# Patient Record
Sex: MALE | Race: WHITE | NOT HISPANIC OR LATINO | Employment: OTHER | URBAN - METROPOLITAN AREA
[De-identification: names, ages, dates, MRNs, and addresses within clinical notes are randomized per-mention and may not be internally consistent; named-entity substitution may affect disease eponyms.]

---

## 2021-01-01 ENCOUNTER — APPOINTMENT (OUTPATIENT)
Dept: NEUROLOGY | Facility: CLINIC | Age: 66
End: 2021-01-01
Attending: PHYSICIAN ASSISTANT
Payer: COMMERCIAL

## 2021-01-01 ENCOUNTER — APPOINTMENT (OUTPATIENT)
Dept: CT IMAGING | Facility: CLINIC | Age: 66
End: 2021-01-01
Attending: PHYSICIAN ASSISTANT
Payer: COMMERCIAL

## 2021-01-01 ENCOUNTER — APPOINTMENT (OUTPATIENT)
Dept: GENERAL RADIOLOGY | Facility: CLINIC | Age: 66
End: 2021-01-01
Attending: PHYSICIAN ASSISTANT
Payer: COMMERCIAL

## 2021-01-01 ENCOUNTER — APPOINTMENT (OUTPATIENT)
Dept: GENERAL RADIOLOGY | Facility: CLINIC | Age: 66
End: 2021-01-01
Attending: EMERGENCY MEDICINE
Payer: COMMERCIAL

## 2021-01-01 ENCOUNTER — APPOINTMENT (OUTPATIENT)
Dept: CARDIOLOGY | Facility: CLINIC | Age: 66
End: 2021-01-01
Attending: NURSE PRACTITIONER
Payer: COMMERCIAL

## 2021-01-01 ENCOUNTER — ANESTHESIA EVENT (OUTPATIENT)
Dept: SURGERY | Facility: CLINIC | Age: 66
End: 2021-01-01
Payer: COMMERCIAL

## 2021-01-01 ENCOUNTER — PREP FOR PROCEDURE (OUTPATIENT)
Dept: CARDIOLOGY | Facility: CLINIC | Age: 66
End: 2021-01-01

## 2021-01-01 ENCOUNTER — APPOINTMENT (OUTPATIENT)
Dept: ULTRASOUND IMAGING | Facility: CLINIC | Age: 66
End: 2021-01-01
Attending: PHYSICIAN ASSISTANT
Payer: COMMERCIAL

## 2021-01-01 ENCOUNTER — APPOINTMENT (OUTPATIENT)
Dept: GENERAL RADIOLOGY | Facility: CLINIC | Age: 66
End: 2021-01-01
Attending: INTERNAL MEDICINE
Payer: COMMERCIAL

## 2021-01-01 ENCOUNTER — SURGERY (OUTPATIENT)
Age: 66
End: 2021-01-01
Payer: COMMERCIAL

## 2021-01-01 ENCOUNTER — APPOINTMENT (OUTPATIENT)
Dept: MRI IMAGING | Facility: CLINIC | Age: 66
End: 2021-01-01
Attending: INTERNAL MEDICINE
Payer: COMMERCIAL

## 2021-01-01 ENCOUNTER — APPOINTMENT (OUTPATIENT)
Dept: GENERAL RADIOLOGY | Facility: CLINIC | Age: 66
End: 2021-01-01
Attending: STUDENT IN AN ORGANIZED HEALTH CARE EDUCATION/TRAINING PROGRAM
Payer: COMMERCIAL

## 2021-01-01 ENCOUNTER — ANESTHESIA (OUTPATIENT)
Dept: SURGERY | Facility: CLINIC | Age: 66
End: 2021-01-01
Payer: COMMERCIAL

## 2021-01-01 ENCOUNTER — HOSPITAL ENCOUNTER (INPATIENT)
Facility: CLINIC | Age: 66
LOS: 9 days | End: 2021-10-26
Attending: INTERNAL MEDICINE | Admitting: STUDENT IN AN ORGANIZED HEALTH CARE EDUCATION/TRAINING PROGRAM
Payer: COMMERCIAL

## 2021-01-01 ENCOUNTER — APPOINTMENT (OUTPATIENT)
Dept: CARDIOLOGY | Facility: CLINIC | Age: 66
End: 2021-01-01
Attending: PHYSICIAN ASSISTANT
Payer: COMMERCIAL

## 2021-01-01 ENCOUNTER — HOSPITAL ENCOUNTER (EMERGENCY)
Facility: CLINIC | Age: 66
Discharge: STILL ADMITTED OR ADMINISTRATIVE | End: 2021-10-17
Attending: EMERGENCY MEDICINE
Payer: COMMERCIAL

## 2021-01-01 ENCOUNTER — HOSPITAL ENCOUNTER (EMERGENCY)
Facility: CLINIC | Age: 66
Discharge: ANOTHER HOSPITAL | End: 2021-10-17
Attending: EMERGENCY MEDICINE | Admitting: EMERGENCY MEDICINE
Payer: COMMERCIAL

## 2021-01-01 ENCOUNTER — APPOINTMENT (OUTPATIENT)
Dept: ULTRASOUND IMAGING | Facility: CLINIC | Age: 66
End: 2021-01-01
Attending: STUDENT IN AN ORGANIZED HEALTH CARE EDUCATION/TRAINING PROGRAM
Payer: COMMERCIAL

## 2021-01-01 VITALS
BODY MASS INDEX: 30.46 KG/M2 | DIASTOLIC BLOOD PRESSURE: 61 MMHG | OXYGEN SATURATION: 98 % | HEART RATE: 85 BPM | WEIGHT: 224.87 LBS | RESPIRATION RATE: 12 BRPM | HEIGHT: 72 IN | SYSTOLIC BLOOD PRESSURE: 114 MMHG | TEMPERATURE: 103 F

## 2021-01-01 VITALS
HEART RATE: 69 BPM | WEIGHT: 248.02 LBS | OXYGEN SATURATION: 95 % | BODY MASS INDEX: 33.64 KG/M2 | DIASTOLIC BLOOD PRESSURE: 84 MMHG | SYSTOLIC BLOOD PRESSURE: 113 MMHG | RESPIRATION RATE: 23 BRPM

## 2021-01-01 DIAGNOSIS — I46.9 CARDIAC ARREST (H): Primary | ICD-10-CM

## 2021-01-01 DIAGNOSIS — I49.01 VENTRICULAR FIBRILLATION (H): ICD-10-CM

## 2021-01-01 DIAGNOSIS — I21.3 ST ELEVATION MI (STEMI) (H): ICD-10-CM

## 2021-01-01 DIAGNOSIS — I46.9 CARDIAC ARREST (H): ICD-10-CM

## 2021-01-01 LAB
7AMINOCLONAZEPAM SERPL-MCNC: NEGATIVE NG/ML
ABO/RH(D): NORMAL
ACT BLD: 126 SECONDS (ref 74–150)
ACT BLD: 126 SECONDS (ref 74–150)
ACT BLD: 142 SECONDS (ref 74–150)
ACT BLD: 142 SECONDS (ref 74–150)
ACT BLD: 143 SECONDS (ref 74–150)
ACT BLD: 155 SECONDS (ref 74–150)
ACT BLD: 163 SECONDS (ref 74–150)
ACT BLD: 167 SECONDS (ref 74–150)
ACT BLD: 171 SECONDS (ref 74–150)
ACT BLD: 175 SECONDS (ref 74–150)
ACT BLD: 179 SECONDS (ref 74–150)
ACT BLD: 183 SECONDS (ref 74–150)
ACT BLD: 183 SECONDS (ref 74–150)
ACT BLD: 187 SECONDS (ref 74–150)
ACT BLD: 187 SECONDS (ref 74–150)
ACT BLD: 191 SECONDS (ref 74–150)
ACT BLD: 195 SECONDS (ref 74–150)
ACT BLD: 199 SECONDS (ref 74–150)
ACT BLD: 203 SECONDS (ref 74–150)
ACT BLD: 207 SECONDS (ref 74–150)
ACT BLD: 211 SECONDS (ref 74–150)
ACT BLD: 215 SECONDS (ref 74–150)
ACT BLD: 219 SECONDS (ref 74–150)
ACT BLD: 231 SECONDS (ref 74–150)
ACT BLD: 86 SECONDS (ref 74–150)
ALBUMIN SERPL-MCNC: 2 G/DL (ref 3.4–5)
ALBUMIN SERPL-MCNC: 2 G/DL (ref 3.4–5)
ALBUMIN SERPL-MCNC: 2.1 G/DL (ref 3.4–5)
ALBUMIN SERPL-MCNC: 2.2 G/DL (ref 3.4–5)
ALBUMIN SERPL-MCNC: 2.3 G/DL (ref 3.4–5)
ALBUMIN SERPL-MCNC: 2.4 G/DL (ref 3.4–5)
ALBUMIN SERPL-MCNC: 2.5 G/DL (ref 3.4–5)
ALBUMIN SERPL-MCNC: 2.5 G/DL (ref 3.4–5)
ALBUMIN SERPL-MCNC: 2.6 G/DL (ref 3.4–5)
ALBUMIN SERPL-MCNC: 2.8 G/DL (ref 3.4–5)
ALBUMIN SERPL-MCNC: 3.1 G/DL (ref 3.4–5)
ALBUMIN UR-MCNC: 50 MG/DL
ALP SERPL-CCNC: 50 U/L (ref 40–150)
ALP SERPL-CCNC: 51 U/L (ref 40–150)
ALP SERPL-CCNC: 54 U/L (ref 40–150)
ALP SERPL-CCNC: 55 U/L (ref 40–150)
ALP SERPL-CCNC: 56 U/L (ref 40–150)
ALP SERPL-CCNC: 56 U/L (ref 40–150)
ALP SERPL-CCNC: 59 U/L (ref 40–150)
ALP SERPL-CCNC: 59 U/L (ref 40–150)
ALP SERPL-CCNC: 60 U/L (ref 40–150)
ALP SERPL-CCNC: 61 U/L (ref 40–150)
ALP SERPL-CCNC: 62 U/L (ref 40–150)
ALP SERPL-CCNC: 62 U/L (ref 40–150)
ALP SERPL-CCNC: 63 U/L (ref 40–150)
ALP SERPL-CCNC: 64 U/L (ref 40–150)
ALP SERPL-CCNC: 64 U/L (ref 40–150)
ALP SERPL-CCNC: 66 U/L (ref 40–150)
ALP SERPL-CCNC: 66 U/L (ref 40–150)
ALP SERPL-CCNC: 70 U/L (ref 40–150)
ALP SERPL-CCNC: 75 U/L (ref 40–150)
ALP SERPL-CCNC: 75 U/L (ref 40–150)
ALP SERPL-CCNC: 78 U/L (ref 40–150)
ALP SERPL-CCNC: 79 U/L (ref 40–150)
ALP SERPL-CCNC: 80 U/L (ref 40–150)
ALP SERPL-CCNC: 80 U/L (ref 40–150)
ALP SERPL-CCNC: 86 U/L (ref 40–150)
ALP SERPL-CCNC: 87 U/L (ref 40–150)
ALP SERPL-CCNC: 87 U/L (ref 40–150)
ALP SERPL-CCNC: 88 U/L (ref 40–150)
ALP SERPL-CCNC: 89 U/L (ref 40–150)
ALP SERPL-CCNC: 91 U/L (ref 40–150)
ALPRAZ SERPL-MCNC: NEGATIVE NG/ML
ALT SERPL W P-5'-P-CCNC: 102 U/L (ref 0–70)
ALT SERPL W P-5'-P-CCNC: 111 U/L (ref 0–70)
ALT SERPL W P-5'-P-CCNC: 118 U/L (ref 0–70)
ALT SERPL W P-5'-P-CCNC: 123 U/L (ref 0–70)
ALT SERPL W P-5'-P-CCNC: 132 U/L (ref 0–70)
ALT SERPL W P-5'-P-CCNC: 142 U/L (ref 0–70)
ALT SERPL W P-5'-P-CCNC: 146 U/L (ref 0–70)
ALT SERPL W P-5'-P-CCNC: 165 U/L (ref 0–70)
ALT SERPL W P-5'-P-CCNC: 166 U/L (ref 0–70)
ALT SERPL W P-5'-P-CCNC: 187 U/L (ref 0–70)
ALT SERPL W P-5'-P-CCNC: 200 U/L (ref 0–70)
ALT SERPL W P-5'-P-CCNC: 215 U/L (ref 0–70)
ALT SERPL W P-5'-P-CCNC: 232 U/L (ref 0–70)
ALT SERPL W P-5'-P-CCNC: 263 U/L (ref 0–70)
ALT SERPL W P-5'-P-CCNC: 296 U/L (ref 0–70)
ALT SERPL W P-5'-P-CCNC: 319 U/L (ref 0–70)
ALT SERPL W P-5'-P-CCNC: 348 U/L (ref 0–70)
ALT SERPL W P-5'-P-CCNC: 362 U/L (ref 0–70)
ALT SERPL W P-5'-P-CCNC: 410 U/L (ref 0–70)
ALT SERPL W P-5'-P-CCNC: 411 U/L (ref 0–70)
ALT SERPL W P-5'-P-CCNC: 462 U/L (ref 0–70)
ALT SERPL W P-5'-P-CCNC: 466 U/L (ref 0–70)
ALT SERPL W P-5'-P-CCNC: 482 U/L (ref 0–70)
ALT SERPL W P-5'-P-CCNC: 520 U/L (ref 0–70)
ALT SERPL W P-5'-P-CCNC: 542 U/L (ref 0–70)
ALT SERPL W P-5'-P-CCNC: 91 U/L (ref 0–70)
ALT SERPL W P-5'-P-CCNC: 94 U/L (ref 0–70)
ALT SERPL W P-5'-P-CCNC: 96 U/L (ref 0–70)
AMPHETAMINES UR QL SCN: ABNORMAL
ANION GAP SERPL CALCULATED.3IONS-SCNC: 11 MMOL/L (ref 3–14)
ANION GAP SERPL CALCULATED.3IONS-SCNC: 12 MMOL/L (ref 3–14)
ANION GAP SERPL CALCULATED.3IONS-SCNC: 15 MMOL/L (ref 3–14)
ANION GAP SERPL CALCULATED.3IONS-SCNC: 20 MMOL/L (ref 3–14)
ANION GAP SERPL CALCULATED.3IONS-SCNC: 4 MMOL/L (ref 3–14)
ANION GAP SERPL CALCULATED.3IONS-SCNC: 4 MMOL/L (ref 3–14)
ANION GAP SERPL CALCULATED.3IONS-SCNC: 6 MMOL/L (ref 3–14)
ANION GAP SERPL CALCULATED.3IONS-SCNC: 7 MMOL/L (ref 3–14)
ANION GAP SERPL CALCULATED.3IONS-SCNC: 8 MMOL/L (ref 3–14)
ANION GAP SERPL CALCULATED.3IONS-SCNC: 9 MMOL/L (ref 3–14)
ANTIBODY SCREEN: NEGATIVE
APPEARANCE UR: ABNORMAL
APTT PPP: 103 SECONDS (ref 22–38)
APTT PPP: 107 SECONDS (ref 22–38)
APTT PPP: 108 SECONDS (ref 22–38)
APTT PPP: 126 SECONDS (ref 22–38)
APTT PPP: 127 SECONDS (ref 22–38)
APTT PPP: 132 SECONDS (ref 22–38)
APTT PPP: 133 SECONDS (ref 22–38)
APTT PPP: 167 SECONDS (ref 22–38)
APTT PPP: 205 SECONDS (ref 22–38)
APTT PPP: 31 SECONDS (ref 22–38)
APTT PPP: 33 SECONDS (ref 22–38)
APTT PPP: 33 SECONDS (ref 22–38)
APTT PPP: 34 SECONDS (ref 22–38)
APTT PPP: 35 SECONDS (ref 22–38)
APTT PPP: 36 SECONDS (ref 22–38)
APTT PPP: 37 SECONDS (ref 22–38)
APTT PPP: 43 SECONDS (ref 22–38)
APTT PPP: 49 SECONDS (ref 22–38)
APTT PPP: 51 SECONDS (ref 22–38)
APTT PPP: 61 SECONDS (ref 22–38)
APTT PPP: 96 SECONDS (ref 22–38)
APTT PPP: >240 SECONDS (ref 22–38)
AST SERPL W P-5'-P-CCNC: 101 U/L (ref 0–45)
AST SERPL W P-5'-P-CCNC: 1058 U/L (ref 0–45)
AST SERPL W P-5'-P-CCNC: 108 U/L (ref 0–45)
AST SERPL W P-5'-P-CCNC: 1091 U/L (ref 0–45)
AST SERPL W P-5'-P-CCNC: 121 U/L (ref 0–45)
AST SERPL W P-5'-P-CCNC: 131 U/L (ref 0–45)
AST SERPL W P-5'-P-CCNC: 148 U/L (ref 0–45)
AST SERPL W P-5'-P-CCNC: 152 U/L (ref 0–45)
AST SERPL W P-5'-P-CCNC: 168 U/L (ref 0–45)
AST SERPL W P-5'-P-CCNC: 191 U/L (ref 0–45)
AST SERPL W P-5'-P-CCNC: 201 U/L (ref 0–45)
AST SERPL W P-5'-P-CCNC: 212 U/L (ref 0–45)
AST SERPL W P-5'-P-CCNC: 225 U/L (ref 0–45)
AST SERPL W P-5'-P-CCNC: 252 U/L (ref 0–45)
AST SERPL W P-5'-P-CCNC: 273 U/L (ref 0–45)
AST SERPL W P-5'-P-CCNC: 282 U/L (ref 0–45)
AST SERPL W P-5'-P-CCNC: 340 U/L (ref 0–45)
AST SERPL W P-5'-P-CCNC: 395 U/L (ref 0–45)
AST SERPL W P-5'-P-CCNC: 462 U/L (ref 0–45)
AST SERPL W P-5'-P-CCNC: 546 U/L (ref 0–45)
AST SERPL W P-5'-P-CCNC: 666 U/L (ref 0–45)
AST SERPL W P-5'-P-CCNC: 732 U/L (ref 0–45)
AST SERPL W P-5'-P-CCNC: 77 U/L (ref 0–45)
AST SERPL W P-5'-P-CCNC: 78 U/L (ref 0–45)
AST SERPL W P-5'-P-CCNC: 829 U/L (ref 0–45)
AST SERPL W P-5'-P-CCNC: 85 U/L (ref 0–45)
AST SERPL W P-5'-P-CCNC: 866 U/L (ref 0–45)
AST SERPL W P-5'-P-CCNC: 88 U/L (ref 0–45)
AST SERPL W P-5'-P-CCNC: 92 U/L (ref 0–45)
AST SERPL W P-5'-P-CCNC: 984 U/L (ref 0–45)
AT III ACT/NOR PPP CHRO: 53 % (ref 85–135)
AT III ACT/NOR PPP CHRO: 60 % (ref 85–135)
AT III ACT/NOR PPP CHRO: 61 % (ref 85–135)
AT III ACT/NOR PPP CHRO: 74 % (ref 85–135)
ATRIAL RATE - MUSE: 125 BPM
ATRIAL RATE - MUSE: 69 BPM
ATRIAL RATE - MUSE: 73 BPM
ATRIAL RATE - MUSE: 85 BPM
ATRIAL RATE - MUSE: 86 BPM
ATRIAL RATE - MUSE: 86 BPM
ATRIAL RATE - MUSE: 89 BPM
ATRIAL RATE - MUSE: 91 BPM
ATRIAL RATE - MUSE: 94 BPM
BACTERIA BLD CULT: NO GROWTH
BACTERIA SPT CULT: ABNORMAL
BACTERIA UR CULT: NO GROWTH
BARBITURATES UR QL: ABNORMAL
BASE EXCESS BLDA CALC-SCNC: -0.4 MMOL/L (ref -9–1.8)
BASE EXCESS BLDA CALC-SCNC: -0.7 MMOL/L (ref -9–1.8)
BASE EXCESS BLDA CALC-SCNC: -0.8 MMOL/L (ref -9–1.8)
BASE EXCESS BLDA CALC-SCNC: -0.8 MMOL/L (ref -9–1.8)
BASE EXCESS BLDA CALC-SCNC: -1 MMOL/L (ref -9–1.8)
BASE EXCESS BLDA CALC-SCNC: -1.1 MMOL/L (ref -9–1.8)
BASE EXCESS BLDA CALC-SCNC: -1.1 MMOL/L (ref -9–1.8)
BASE EXCESS BLDA CALC-SCNC: -1.8 MMOL/L (ref -9–1.8)
BASE EXCESS BLDA CALC-SCNC: -10.9 MMOL/L (ref -9–1.8)
BASE EXCESS BLDA CALC-SCNC: -11.2 MMOL/L (ref -9.6–2)
BASE EXCESS BLDA CALC-SCNC: -11.3 MMOL/L (ref -9–1.8)
BASE EXCESS BLDA CALC-SCNC: -2.6 MMOL/L (ref -9–1.8)
BASE EXCESS BLDA CALC-SCNC: -20.9 MMOL/L (ref -9–1.8)
BASE EXCESS BLDA CALC-SCNC: -3.6 MMOL/L (ref -9–1.8)
BASE EXCESS BLDA CALC-SCNC: -5.3 MMOL/L (ref -9–1.8)
BASE EXCESS BLDA CALC-SCNC: -8.2 MMOL/L (ref -9–1.8)
BASE EXCESS BLDA CALC-SCNC: -8.4 MMOL/L (ref -9–1.8)
BASE EXCESS BLDA CALC-SCNC: -8.4 MMOL/L (ref -9–1.8)
BASE EXCESS BLDA CALC-SCNC: -9.8 MMOL/L (ref -9–1.8)
BASE EXCESS BLDA CALC-SCNC: 0.3 MMOL/L (ref -9–1.8)
BASE EXCESS BLDA CALC-SCNC: 0.6 MMOL/L (ref -9–1.8)
BASE EXCESS BLDA CALC-SCNC: 1.3 MMOL/L (ref -9–1.8)
BASE EXCESS BLDA CALC-SCNC: 1.4 MMOL/L (ref -9–1.8)
BASE EXCESS BLDA CALC-SCNC: 1.4 MMOL/L (ref -9–1.8)
BASE EXCESS BLDA CALC-SCNC: 1.5 MMOL/L (ref -9–1.8)
BASE EXCESS BLDA CALC-SCNC: 1.6 MMOL/L (ref -9–1.8)
BASE EXCESS BLDA CALC-SCNC: 1.6 MMOL/L (ref -9–1.8)
BASE EXCESS BLDA CALC-SCNC: 1.8 MMOL/L (ref -9.6–2)
BASE EXCESS BLDA CALC-SCNC: 1.9 MMOL/L (ref -9–1.8)
BASE EXCESS BLDA CALC-SCNC: 2 MMOL/L (ref -9–1.8)
BASE EXCESS BLDA CALC-SCNC: 2.2 MMOL/L (ref -9–1.8)
BASE EXCESS BLDA CALC-SCNC: 2.3 MMOL/L (ref -9–1.8)
BASE EXCESS BLDA CALC-SCNC: 2.4 MMOL/L (ref -9.6–2)
BASE EXCESS BLDA CALC-SCNC: 2.5 MMOL/L (ref -9–1.8)
BASE EXCESS BLDA CALC-SCNC: 2.6 MMOL/L (ref -9–1.8)
BASE EXCESS BLDA CALC-SCNC: 2.7 MMOL/L (ref -9–1.8)
BASE EXCESS BLDA CALC-SCNC: 3.1 MMOL/L (ref -9.6–2)
BASE EXCESS BLDA CALC-SCNC: 3.1 MMOL/L (ref -9–1.8)
BASE EXCESS BLDA CALC-SCNC: 3.1 MMOL/L (ref -9–1.8)
BASE EXCESS BLDA CALC-SCNC: 3.4 MMOL/L (ref -9–1.8)
BASE EXCESS BLDA CALC-SCNC: 3.6 MMOL/L (ref -9–1.8)
BASE EXCESS BLDA CALC-SCNC: 3.6 MMOL/L (ref -9–1.8)
BASE EXCESS BLDA CALC-SCNC: 3.7 MMOL/L (ref -9–1.8)
BASE EXCESS BLDA CALC-SCNC: 4.2 MMOL/L (ref -9.6–2)
BASE EXCESS BLDA CALC-SCNC: 4.4 MMOL/L (ref -9–1.8)
BASE EXCESS BLDA CALC-SCNC: 4.6 MMOL/L (ref -9–1.8)
BASE EXCESS BLDV CALC-SCNC: -10 MMOL/L (ref -7.7–1.9)
BASE EXCESS BLDV CALC-SCNC: -10.2 MMOL/L (ref -7.7–1.9)
BASE EXCESS BLDV CALC-SCNC: 0.7 MMOL/L (ref -7.7–1.9)
BASE EXCESS BLDV CALC-SCNC: 2.3 MMOL/L (ref -7.7–1.9)
BASE EXCESS BLDV CALC-SCNC: 3.3 MMOL/L (ref -7.7–1.9)
BASE EXCESS BLDV CALC-SCNC: 3.4 MMOL/L (ref -7.7–1.9)
BASE EXCESS BLDV CALC-SCNC: 4.4 MMOL/L (ref -7.7–1.9)
BASOPHILS # BLD AUTO: 0.1 10E3/UL (ref 0–0.2)
BASOPHILS # BLD MANUAL: 0.1 10E3/UL (ref 0–0.2)
BASOPHILS NFR BLD AUTO: 0 %
BASOPHILS NFR BLD MANUAL: 1 %
BENZODIAZ SPEC QL: POSITIVE
BENZODIAZ UR QL: ABNORMAL
BILIRUB DIRECT SERPL-MCNC: 0.2 MG/DL (ref 0–0.2)
BILIRUB DIRECT SERPL-MCNC: 0.3 MG/DL (ref 0–0.2)
BILIRUB DIRECT SERPL-MCNC: 0.3 MG/DL (ref 0–0.2)
BILIRUB DIRECT SERPL-MCNC: <0.1 MG/DL (ref 0–0.2)
BILIRUB SERPL-MCNC: 0.3 MG/DL (ref 0.2–1.3)
BILIRUB SERPL-MCNC: 0.4 MG/DL (ref 0.2–1.3)
BILIRUB SERPL-MCNC: 0.5 MG/DL (ref 0.2–1.3)
BILIRUB SERPL-MCNC: 0.6 MG/DL (ref 0.2–1.3)
BILIRUB SERPL-MCNC: 0.7 MG/DL (ref 0.2–1.3)
BILIRUB SERPL-MCNC: 0.7 MG/DL (ref 0.2–1.3)
BILIRUB SERPL-MCNC: 0.8 MG/DL (ref 0.2–1.3)
BILIRUB SERPL-MCNC: 1 MG/DL (ref 0.2–1.3)
BILIRUB SERPL-MCNC: 1 MG/DL (ref 0.2–1.3)
BILIRUB UR QL STRIP: NEGATIVE
BLD PROD TYP BPU: NORMAL
BLD PROD TYP BPU: NORMAL
BLOOD COMPONENT TYPE: NORMAL
BLOOD COMPONENT TYPE: NORMAL
BUN SERPL-MCNC: 15 MG/DL (ref 7–30)
BUN SERPL-MCNC: 22 MG/DL (ref 7–30)
BUN SERPL-MCNC: 26 MG/DL (ref 7–30)
BUN SERPL-MCNC: 28 MG/DL (ref 7–30)
BUN SERPL-MCNC: 30 MG/DL (ref 7–30)
BUN SERPL-MCNC: 32 MG/DL (ref 7–30)
BUN SERPL-MCNC: 33 MG/DL (ref 7–30)
BUN SERPL-MCNC: 34 MG/DL (ref 7–30)
BUN SERPL-MCNC: 34 MG/DL (ref 7–30)
BUN SERPL-MCNC: 35 MG/DL (ref 7–30)
BUN SERPL-MCNC: 36 MG/DL (ref 7–30)
BUN SERPL-MCNC: 42 MG/DL (ref 7–30)
BUN SERPL-MCNC: 43 MG/DL (ref 7–30)
BUN SERPL-MCNC: 46 MG/DL (ref 7–30)
BUN SERPL-MCNC: 52 MG/DL (ref 7–30)
BUN SERPL-MCNC: 55 MG/DL (ref 7–30)
BUN SERPL-MCNC: 55 MG/DL (ref 7–30)
BUN SERPL-MCNC: 56 MG/DL (ref 7–30)
BUN SERPL-MCNC: 57 MG/DL (ref 7–30)
BUN SERPL-MCNC: 58 MG/DL (ref 7–30)
BUN SERPL-MCNC: 59 MG/DL (ref 7–30)
BUN SERPL-MCNC: 59 MG/DL (ref 7–30)
BUN SERPL-MCNC: 61 MG/DL (ref 7–30)
BUN SERPL-MCNC: 62 MG/DL (ref 7–30)
BUN SERPL-MCNC: 64 MG/DL (ref 7–30)
BUN SERPL-MCNC: 65 MG/DL (ref 7–30)
CA-I BLD-MCNC: 3.8 MG/DL (ref 4.4–5.2)
CA-I BLD-MCNC: 3.9 MG/DL (ref 4.4–5.2)
CA-I BLD-MCNC: 4.1 MG/DL (ref 4.4–5.2)
CA-I BLD-MCNC: 4.2 MG/DL (ref 4.4–5.2)
CA-I BLD-MCNC: 4.3 MG/DL (ref 4.4–5.2)
CA-I BLD-MCNC: 4.4 MG/DL (ref 4.4–5.2)
CA-I BLD-MCNC: 4.5 MG/DL (ref 4.4–5.2)
CA-I BLD-MCNC: 4.6 MG/DL (ref 4.4–5.2)
CA-I BLD-MCNC: 4.7 MG/DL (ref 4.4–5.2)
CA-I BLD-MCNC: 4.8 MG/DL (ref 4.4–5.2)
CALCIUM SERPL-MCNC: 6.3 MG/DL (ref 8.5–10.1)
CALCIUM SERPL-MCNC: 7.1 MG/DL (ref 8.5–10.1)
CALCIUM SERPL-MCNC: 7.4 MG/DL (ref 8.5–10.1)
CALCIUM SERPL-MCNC: 7.6 MG/DL (ref 8.5–10.1)
CALCIUM SERPL-MCNC: 7.7 MG/DL (ref 8.5–10.1)
CALCIUM SERPL-MCNC: 7.8 MG/DL (ref 8.5–10.1)
CALCIUM SERPL-MCNC: 7.9 MG/DL (ref 8.5–10.1)
CALCIUM SERPL-MCNC: 8 MG/DL (ref 8.5–10.1)
CALCIUM SERPL-MCNC: 8.1 MG/DL (ref 8.5–10.1)
CALCIUM SERPL-MCNC: 8.2 MG/DL (ref 8.5–10.1)
CALCIUM SERPL-MCNC: 8.2 MG/DL (ref 8.5–10.1)
CALCIUM SERPL-MCNC: 8.3 MG/DL (ref 8.5–10.1)
CALCIUM SERPL-MCNC: 8.3 MG/DL (ref 8.5–10.1)
CALCIUM SERPL-MCNC: 8.4 MG/DL (ref 8.5–10.1)
CALCIUM SERPL-MCNC: 8.5 MG/DL (ref 8.5–10.1)
CALCIUM SERPL-MCNC: 8.6 MG/DL (ref 8.5–10.1)
CANNABINOIDS UR QL SCN: ABNORMAL
CF REDUC 30M P MA P HEP LENFR BLD TEG: 0 % (ref 0–8)
CF REDUC 60M P MA P HEPASE LENFR BLD TEG: 0 % (ref 0–15)
CF REDUC 60M P MA P HEPASE LENFR BLD TEG: 0.2 % (ref 0–15)
CF REDUC 60M P MA P HEPASE LENFR BLD TEG: 0.4 % (ref 0–15)
CFT P HPASE BLD TEG: 1.5 MINUTE (ref 1–3)
CFT P HPASE BLD TEG: 1.7 MINUTE (ref 1–3)
CFT P HPASE BLD TEG: 3.7 MINUTE (ref 1–3)
CHLORDIAZEP SERPL-MCNC: NEGATIVE NG/ML
CHLORIDE BLD-SCNC: 104 MMOL/L (ref 94–109)
CHLORIDE BLD-SCNC: 104 MMOL/L (ref 94–109)
CHLORIDE BLD-SCNC: 105 MMOL/L (ref 94–109)
CHLORIDE BLD-SCNC: 106 MMOL/L (ref 94–109)
CHLORIDE BLD-SCNC: 107 MMOL/L (ref 94–109)
CHLORIDE BLD-SCNC: 107 MMOL/L (ref 94–109)
CHLORIDE BLD-SCNC: 108 MMOL/L (ref 94–109)
CHLORIDE BLD-SCNC: 109 MMOL/L (ref 94–109)
CHLORIDE BLD-SCNC: 110 MMOL/L (ref 94–109)
CHLORIDE BLD-SCNC: 111 MMOL/L (ref 94–109)
CHLORIDE BLD-SCNC: 112 MMOL/L (ref 94–109)
CHLORIDE BLD-SCNC: 113 MMOL/L (ref 94–109)
CHLORIDE BLD-SCNC: 114 MMOL/L (ref 94–109)
CHLORIDE BLD-SCNC: 115 MMOL/L (ref 94–109)
CHLORIDE BLD-SCNC: 115 MMOL/L (ref 94–109)
CHLORIDE BLD-SCNC: 116 MMOL/L (ref 94–109)
CHLORIDE BLD-SCNC: 116 MMOL/L (ref 94–109)
CI (COAGULATION INDEX)(Z): -0.6 (ref -3–3)
CI (COAGULATION INDEX)(Z): -6.5 (ref -3–3)
CI (COAGULATION INDEX)(Z): 1.7 (ref -3–3)
CK SERPL-CCNC: 1076 U/L (ref 30–300)
CK SERPL-CCNC: 1177 U/L (ref 30–300)
CK SERPL-CCNC: 1381 U/L (ref 30–300)
CK SERPL-CCNC: 1516 U/L (ref 30–300)
CK SERPL-CCNC: 1678 U/L (ref 30–300)
CK SERPL-CCNC: 2118 U/L (ref 30–300)
CK SERPL-CCNC: 2164 U/L (ref 30–300)
CK SERPL-CCNC: 2335 U/L (ref 30–300)
CK SERPL-CCNC: 2435 U/L (ref 30–300)
CK SERPL-CCNC: 2449 U/L (ref 30–300)
CK SERPL-CCNC: 2790 U/L (ref 30–300)
CK SERPL-CCNC: 3292 U/L (ref 30–300)
CK SERPL-CCNC: 3992 U/L (ref 30–300)
CK SERPL-CCNC: 4482 U/L (ref 30–300)
CK SERPL-CCNC: 482 U/L (ref 30–300)
CK SERPL-CCNC: 5010 U/L (ref 30–300)
CK SERPL-CCNC: 555 U/L (ref 30–300)
CK SERPL-CCNC: 639 U/L (ref 30–300)
CK SERPL-CCNC: 6657 U/L (ref 30–300)
CK SERPL-CCNC: 6679 U/L (ref 30–300)
CK SERPL-CCNC: 681 U/L (ref 30–300)
CK SERPL-CCNC: 733 U/L (ref 30–300)
CK SERPL-CCNC: 7830 U/L (ref 30–300)
CK SERPL-CCNC: 862 U/L (ref 30–300)
CK SERPL-CCNC: 960 U/L (ref 30–300)
CLONAZEPAM SERPL-MCNC: NEGATIVE NG/ML
CLOT ANGLE P HPASE BLD TEG: 46.7 DEGREES (ref 53–72)
CLOT ANGLE P HPASE BLD TEG: 66.1 DEGREES (ref 53–72)
CLOT ANGLE P HPASE BLD TEG: 66.9 DEGREES (ref 53–72)
CLOT INIT P HPASE BLD TEG: 11.8 MINUTE (ref 5–10)
CLOT INIT P HPASE BLD TEG: 4.1 MINUTE (ref 5–10)
CLOT INIT P HPASE BLD TEG: 7.7 MINUTE (ref 5–10)
CLOT STRENGTH P HPASE BLD TEG: 6.3 KD/SC (ref 4.5–11)
CLOT STRENGTH P HPASE BLD TEG: 8.4 KD/SC (ref 4.5–11)
CLOT STRENGTH P HPASE BLD TEG: 8.7 KD/SC (ref 4.5–11)
CO2 SERPL-SCNC: 14 MMOL/L (ref 20–32)
CO2 SERPL-SCNC: 17 MMOL/L (ref 20–32)
CO2 SERPL-SCNC: 18 MMOL/L (ref 20–32)
CO2 SERPL-SCNC: 21 MMOL/L (ref 20–32)
CO2 SERPL-SCNC: 23 MMOL/L (ref 20–32)
CO2 SERPL-SCNC: 24 MMOL/L (ref 20–32)
CO2 SERPL-SCNC: 25 MMOL/L (ref 20–32)
CO2 SERPL-SCNC: 26 MMOL/L (ref 20–32)
CO2 SERPL-SCNC: 27 MMOL/L (ref 20–32)
CO2 SERPL-SCNC: 28 MMOL/L (ref 20–32)
CO2 SERPL-SCNC: 28 MMOL/L (ref 20–32)
COCAINE UR QL: ABNORMAL
CODING SYSTEM: NORMAL
CODING SYSTEM: NORMAL
COHGB MFR BLD: 99 % (ref 92–100)
COLOR UR AUTO: ABNORMAL
CORTIS SERPL-MCNC: 38.2 UG/DL (ref 4–22)
CREAT SERPL-MCNC: 1.36 MG/DL (ref 0.66–1.25)
CREAT SERPL-MCNC: 1.56 MG/DL (ref 0.66–1.25)
CREAT SERPL-MCNC: 1.7 MG/DL (ref 0.66–1.25)
CREAT SERPL-MCNC: 2.01 MG/DL (ref 0.66–1.25)
CREAT SERPL-MCNC: 2.04 MG/DL (ref 0.66–1.25)
CREAT SERPL-MCNC: 2.29 MG/DL (ref 0.66–1.25)
CREAT SERPL-MCNC: 2.3 MG/DL (ref 0.66–1.25)
CREAT SERPL-MCNC: 2.31 MG/DL (ref 0.66–1.25)
CREAT SERPL-MCNC: 2.33 MG/DL (ref 0.66–1.25)
CREAT SERPL-MCNC: 2.34 MG/DL (ref 0.66–1.25)
CREAT SERPL-MCNC: 2.38 MG/DL (ref 0.66–1.25)
CREAT SERPL-MCNC: 2.39 MG/DL (ref 0.66–1.25)
CREAT SERPL-MCNC: 2.41 MG/DL (ref 0.66–1.25)
CREAT SERPL-MCNC: 2.43 MG/DL (ref 0.66–1.25)
CREAT SERPL-MCNC: 2.46 MG/DL (ref 0.66–1.25)
CREAT SERPL-MCNC: 2.47 MG/DL (ref 0.66–1.25)
CREAT SERPL-MCNC: 2.48 MG/DL (ref 0.66–1.25)
CREAT SERPL-MCNC: 2.52 MG/DL (ref 0.66–1.25)
CREAT SERPL-MCNC: 2.52 MG/DL (ref 0.66–1.25)
CREAT SERPL-MCNC: 2.54 MG/DL (ref 0.66–1.25)
CREAT SERPL-MCNC: 2.55 MG/DL (ref 0.66–1.25)
CREAT SERPL-MCNC: 2.55 MG/DL (ref 0.66–1.25)
CREAT SERPL-MCNC: 2.57 MG/DL (ref 0.66–1.25)
CREAT SERPL-MCNC: 2.63 MG/DL (ref 0.66–1.25)
CREAT SERPL-MCNC: 2.64 MG/DL (ref 0.66–1.25)
CREAT SERPL-MCNC: 2.64 MG/DL (ref 0.66–1.25)
CREAT SERPL-MCNC: 2.68 MG/DL (ref 0.66–1.25)
CREAT SERPL-MCNC: 2.71 MG/DL (ref 0.66–1.25)
CREAT SERPL-MCNC: 2.73 MG/DL (ref 0.66–1.25)
CREAT SERPL-MCNC: 2.74 MG/DL (ref 0.66–1.25)
CREAT SERPL-MCNC: 2.8 MG/DL (ref 0.66–1.25)
CREAT SERPL-MCNC: 2.86 MG/DL (ref 0.66–1.25)
CREAT SERPL-MCNC: 2.86 MG/DL (ref 0.66–1.25)
CREAT SERPL-MCNC: 2.91 MG/DL (ref 0.66–1.25)
CREAT SERPL-MCNC: 2.91 MG/DL (ref 0.66–1.25)
CREAT SERPL-MCNC: 3.03 MG/DL (ref 0.66–1.25)
CREAT SERPL-MCNC: 3.06 MG/DL (ref 0.66–1.25)
CREAT SERPL-MCNC: 3.06 MG/DL (ref 0.66–1.25)
CREAT SERPL-MCNC: 3.29 MG/DL (ref 0.66–1.25)
CREAT SERPL-MCNC: 3.29 MG/DL (ref 0.66–1.25)
CRP SERPL-MCNC: 100 MG/L (ref 0–8)
CRP SERPL-MCNC: 130 MG/L (ref 0–8)
CRP SERPL-MCNC: 140 MG/L (ref 0–8)
CRP SERPL-MCNC: 140 MG/L (ref 0–8)
CRP SERPL-MCNC: 150 MG/L (ref 0–8)
CRP SERPL-MCNC: 53 MG/L (ref 0–8)
CRP SERPL-MCNC: 79 MG/L (ref 0–8)
CRP SERPL-MCNC: <2.9 MG/L (ref 0–8)
D DIMER PPP FEU-MCNC: 19.21 UG/ML FEU (ref 0–0.5)
D DIMER PPP FEU-MCNC: 3.7 UG/ML FEU (ref 0–0.5)
D DIMER PPP FEU-MCNC: 3.73 UG/ML FEU (ref 0–0.5)
D DIMER PPP FEU-MCNC: 8.25 UG/ML FEU (ref 0–0.5)
D DIMER PPP FEU-MCNC: >20 UG/ML FEU (ref 0–0.5)
DESALKYLFLURAZ SERPL CFM-MCNC: NEGATIVE NG/ML
DIASTOLIC BLOOD PRESSURE - MUSE: NORMAL MMHG
DIAZEPAM SERPL-MCNC: NEGATIVE NG/ML
EOSINOPHIL # BLD AUTO: 0 10E3/UL (ref 0–0.7)
EOSINOPHIL # BLD MANUAL: 0.4 10E3/UL (ref 0–0.7)
EOSINOPHIL NFR BLD AUTO: 0 %
EOSINOPHIL NFR BLD MANUAL: 3 %
ERYTHROCYTE [DISTWIDTH] IN BLOOD BY AUTOMATED COUNT: 12.2 % (ref 10–15)
ERYTHROCYTE [DISTWIDTH] IN BLOOD BY AUTOMATED COUNT: 12.5 % (ref 10–15)
ERYTHROCYTE [DISTWIDTH] IN BLOOD BY AUTOMATED COUNT: 12.6 % (ref 10–15)
ERYTHROCYTE [DISTWIDTH] IN BLOOD BY AUTOMATED COUNT: 12.6 % (ref 10–15)
ERYTHROCYTE [DISTWIDTH] IN BLOOD BY AUTOMATED COUNT: 12.9 % (ref 10–15)
ERYTHROCYTE [DISTWIDTH] IN BLOOD BY AUTOMATED COUNT: 12.9 % (ref 10–15)
ERYTHROCYTE [DISTWIDTH] IN BLOOD BY AUTOMATED COUNT: 13 % (ref 10–15)
ERYTHROCYTE [DISTWIDTH] IN BLOOD BY AUTOMATED COUNT: 13.1 % (ref 10–15)
ERYTHROCYTE [DISTWIDTH] IN BLOOD BY AUTOMATED COUNT: 13.2 % (ref 10–15)
ERYTHROCYTE [DISTWIDTH] IN BLOOD BY AUTOMATED COUNT: 13.3 % (ref 10–15)
ERYTHROCYTE [DISTWIDTH] IN BLOOD BY AUTOMATED COUNT: 13.3 % (ref 10–15)
ERYTHROCYTE [DISTWIDTH] IN BLOOD BY AUTOMATED COUNT: 13.4 % (ref 10–15)
ERYTHROCYTE [DISTWIDTH] IN BLOOD BY AUTOMATED COUNT: 13.5 % (ref 10–15)
ERYTHROCYTE [DISTWIDTH] IN BLOOD BY AUTOMATED COUNT: 13.5 % (ref 10–15)
ERYTHROCYTE [SEDIMENTATION RATE] IN BLOOD BY WESTERGREN METHOD: 27 MM/HR (ref 0–20)
ERYTHROCYTE [SEDIMENTATION RATE] IN BLOOD BY WESTERGREN METHOD: 3 MM/HR (ref 0–20)
ERYTHROCYTE [SEDIMENTATION RATE] IN BLOOD BY WESTERGREN METHOD: 4 MM/HR (ref 0–20)
ERYTHROCYTE [SEDIMENTATION RATE] IN BLOOD BY WESTERGREN METHOD: 8 MM/HR (ref 0–20)
ETHANOL UR QL SCN: ABNORMAL
FIBRINOGEN PPP-MCNC: 156 MG/DL (ref 170–490)
FIBRINOGEN PPP-MCNC: 256 MG/DL (ref 170–490)
FIBRINOGEN PPP-MCNC: 322 MG/DL (ref 170–490)
FIBRINOGEN PPP-MCNC: 418 MG/DL (ref 170–490)
FIBRINOGEN PPP-MCNC: 466 MG/DL (ref 170–490)
FIBRINOGEN PPP-MCNC: 534 MG/DL (ref 170–490)
FLURAZEPAM SPEC-MCNC: NEGATIVE NG/ML
GFR SERPL CREATININE-BSD FRML MDRD: 19 ML/MIN/1.73M2
GFR SERPL CREATININE-BSD FRML MDRD: 19 ML/MIN/1.73M2
GFR SERPL CREATININE-BSD FRML MDRD: 20 ML/MIN/1.73M2
GFR SERPL CREATININE-BSD FRML MDRD: 20 ML/MIN/1.73M2
GFR SERPL CREATININE-BSD FRML MDRD: 21 ML/MIN/1.73M2
GFR SERPL CREATININE-BSD FRML MDRD: 22 ML/MIN/1.73M2
GFR SERPL CREATININE-BSD FRML MDRD: 23 ML/MIN/1.73M2
GFR SERPL CREATININE-BSD FRML MDRD: 23 ML/MIN/1.73M2
GFR SERPL CREATININE-BSD FRML MDRD: 24 ML/MIN/1.73M2
GFR SERPL CREATININE-BSD FRML MDRD: 25 ML/MIN/1.73M2
GFR SERPL CREATININE-BSD FRML MDRD: 25 ML/MIN/1.73M2
GFR SERPL CREATININE-BSD FRML MDRD: 26 ML/MIN/1.73M2
GFR SERPL CREATININE-BSD FRML MDRD: 27 ML/MIN/1.73M2
GFR SERPL CREATININE-BSD FRML MDRD: 28 ML/MIN/1.73M2
GFR SERPL CREATININE-BSD FRML MDRD: 29 ML/MIN/1.73M2
GFR SERPL CREATININE-BSD FRML MDRD: 33 ML/MIN/1.73M2
GFR SERPL CREATININE-BSD FRML MDRD: 34 ML/MIN/1.73M2
GFR SERPL CREATININE-BSD FRML MDRD: 37 ML/MIN/1.73M2
GFR SERPL CREATININE-BSD FRML MDRD: 42 ML/MIN/1.73M2
GFR SERPL CREATININE-BSD FRML MDRD: 46 ML/MIN/1.73M2
GLUCOSE BLD-MCNC: 101 MG/DL (ref 70–99)
GLUCOSE BLD-MCNC: 110 MG/DL (ref 70–99)
GLUCOSE BLD-MCNC: 111 MG/DL (ref 70–99)
GLUCOSE BLD-MCNC: 111 MG/DL (ref 70–99)
GLUCOSE BLD-MCNC: 112 MG/DL (ref 70–99)
GLUCOSE BLD-MCNC: 113 MG/DL (ref 70–99)
GLUCOSE BLD-MCNC: 114 MG/DL (ref 70–99)
GLUCOSE BLD-MCNC: 116 MG/DL (ref 70–99)
GLUCOSE BLD-MCNC: 116 MG/DL (ref 70–99)
GLUCOSE BLD-MCNC: 117 MG/DL (ref 70–99)
GLUCOSE BLD-MCNC: 117 MG/DL (ref 70–99)
GLUCOSE BLD-MCNC: 120 MG/DL (ref 70–99)
GLUCOSE BLD-MCNC: 123 MG/DL (ref 70–99)
GLUCOSE BLD-MCNC: 123 MG/DL (ref 70–99)
GLUCOSE BLD-MCNC: 124 MG/DL (ref 70–99)
GLUCOSE BLD-MCNC: 125 MG/DL (ref 70–99)
GLUCOSE BLD-MCNC: 126 MG/DL (ref 70–99)
GLUCOSE BLD-MCNC: 127 MG/DL (ref 70–99)
GLUCOSE BLD-MCNC: 128 MG/DL (ref 70–99)
GLUCOSE BLD-MCNC: 129 MG/DL (ref 70–99)
GLUCOSE BLD-MCNC: 131 MG/DL (ref 70–99)
GLUCOSE BLD-MCNC: 132 MG/DL (ref 70–99)
GLUCOSE BLD-MCNC: 132 MG/DL (ref 70–99)
GLUCOSE BLD-MCNC: 133 MG/DL (ref 70–99)
GLUCOSE BLD-MCNC: 134 MG/DL (ref 70–99)
GLUCOSE BLD-MCNC: 135 MG/DL (ref 70–99)
GLUCOSE BLD-MCNC: 137 MG/DL (ref 70–99)
GLUCOSE BLD-MCNC: 137 MG/DL (ref 70–99)
GLUCOSE BLD-MCNC: 138 MG/DL (ref 70–99)
GLUCOSE BLD-MCNC: 138 MG/DL (ref 70–99)
GLUCOSE BLD-MCNC: 139 MG/DL (ref 70–99)
GLUCOSE BLD-MCNC: 139 MG/DL (ref 70–99)
GLUCOSE BLD-MCNC: 141 MG/DL (ref 70–99)
GLUCOSE BLD-MCNC: 144 MG/DL (ref 70–99)
GLUCOSE BLD-MCNC: 145 MG/DL (ref 70–99)
GLUCOSE BLD-MCNC: 146 MG/DL (ref 70–99)
GLUCOSE BLD-MCNC: 147 MG/DL (ref 70–99)
GLUCOSE BLD-MCNC: 149 MG/DL (ref 70–99)
GLUCOSE BLD-MCNC: 151 MG/DL (ref 70–99)
GLUCOSE BLD-MCNC: 152 MG/DL (ref 70–99)
GLUCOSE BLD-MCNC: 153 MG/DL (ref 70–99)
GLUCOSE BLD-MCNC: 156 MG/DL (ref 70–99)
GLUCOSE BLD-MCNC: 163 MG/DL (ref 70–99)
GLUCOSE BLD-MCNC: 165 MG/DL (ref 70–99)
GLUCOSE BLD-MCNC: 217 MG/DL (ref 70–99)
GLUCOSE BLD-MCNC: 232 MG/DL (ref 70–99)
GLUCOSE BLD-MCNC: 299 MG/DL (ref 70–99)
GLUCOSE BLD-MCNC: 395 MG/DL (ref 70–99)
GLUCOSE BLD-MCNC: 397 MG/DL (ref 70–99)
GLUCOSE BLD-MCNC: 399 MG/DL (ref 70–99)
GLUCOSE BLD-MCNC: 401 MG/DL (ref 70–99)
GLUCOSE BLDC GLUCOMTR-MCNC: 102 MG/DL (ref 70–99)
GLUCOSE BLDC GLUCOMTR-MCNC: 106 MG/DL (ref 70–99)
GLUCOSE BLDC GLUCOMTR-MCNC: 108 MG/DL (ref 70–99)
GLUCOSE BLDC GLUCOMTR-MCNC: 109 MG/DL (ref 70–99)
GLUCOSE BLDC GLUCOMTR-MCNC: 113 MG/DL (ref 70–99)
GLUCOSE BLDC GLUCOMTR-MCNC: 116 MG/DL (ref 70–99)
GLUCOSE BLDC GLUCOMTR-MCNC: 116 MG/DL (ref 70–99)
GLUCOSE BLDC GLUCOMTR-MCNC: 117 MG/DL (ref 70–99)
GLUCOSE BLDC GLUCOMTR-MCNC: 118 MG/DL (ref 70–99)
GLUCOSE BLDC GLUCOMTR-MCNC: 119 MG/DL (ref 70–99)
GLUCOSE BLDC GLUCOMTR-MCNC: 120 MG/DL (ref 70–99)
GLUCOSE BLDC GLUCOMTR-MCNC: 120 MG/DL (ref 70–99)
GLUCOSE BLDC GLUCOMTR-MCNC: 121 MG/DL (ref 70–99)
GLUCOSE BLDC GLUCOMTR-MCNC: 121 MG/DL (ref 70–99)
GLUCOSE BLDC GLUCOMTR-MCNC: 124 MG/DL (ref 70–99)
GLUCOSE BLDC GLUCOMTR-MCNC: 124 MG/DL (ref 70–99)
GLUCOSE BLDC GLUCOMTR-MCNC: 126 MG/DL (ref 70–99)
GLUCOSE BLDC GLUCOMTR-MCNC: 126 MG/DL (ref 70–99)
GLUCOSE BLDC GLUCOMTR-MCNC: 128 MG/DL (ref 70–99)
GLUCOSE BLDC GLUCOMTR-MCNC: 128 MG/DL (ref 70–99)
GLUCOSE BLDC GLUCOMTR-MCNC: 129 MG/DL (ref 70–99)
GLUCOSE BLDC GLUCOMTR-MCNC: 133 MG/DL (ref 70–99)
GLUCOSE BLDC GLUCOMTR-MCNC: 133 MG/DL (ref 70–99)
GLUCOSE BLDC GLUCOMTR-MCNC: 135 MG/DL (ref 70–99)
GLUCOSE BLDC GLUCOMTR-MCNC: 136 MG/DL (ref 70–99)
GLUCOSE BLDC GLUCOMTR-MCNC: 140 MG/DL (ref 70–99)
GLUCOSE BLDC GLUCOMTR-MCNC: 141 MG/DL (ref 70–99)
GLUCOSE BLDC GLUCOMTR-MCNC: 146 MG/DL (ref 70–99)
GLUCOSE BLDC GLUCOMTR-MCNC: 146 MG/DL (ref 70–99)
GLUCOSE BLDC GLUCOMTR-MCNC: 147 MG/DL (ref 70–99)
GLUCOSE BLDC GLUCOMTR-MCNC: 147 MG/DL (ref 70–99)
GLUCOSE BLDC GLUCOMTR-MCNC: 155 MG/DL (ref 70–99)
GLUCOSE BLDC GLUCOMTR-MCNC: 157 MG/DL (ref 70–99)
GLUCOSE BLDC GLUCOMTR-MCNC: 162 MG/DL (ref 70–99)
GLUCOSE BLDC GLUCOMTR-MCNC: 182 MG/DL (ref 70–99)
GLUCOSE BLDC GLUCOMTR-MCNC: 205 MG/DL (ref 70–99)
GLUCOSE BLDC GLUCOMTR-MCNC: 238 MG/DL (ref 70–99)
GLUCOSE BLDC GLUCOMTR-MCNC: 311 MG/DL (ref 70–99)
GLUCOSE BLDC GLUCOMTR-MCNC: 340 MG/DL (ref 70–99)
GLUCOSE BLDC GLUCOMTR-MCNC: 370 MG/DL (ref 70–99)
GLUCOSE BLDC GLUCOMTR-MCNC: 384 MG/DL (ref 70–99)
GLUCOSE UR STRIP-MCNC: 500 MG/DL
GRAM STAIN RESULT: ABNORMAL
HBA1C MFR BLD: 5.5 % (ref 0–5.6)
HCO3 BLD-SCNC: 13 MMOL/L (ref 21–28)
HCO3 BLD-SCNC: 16 MMOL/L (ref 21–28)
HCO3 BLD-SCNC: 16 MMOL/L (ref 21–28)
HCO3 BLD-SCNC: 17 MMOL/L (ref 21–28)
HCO3 BLD-SCNC: 17 MMOL/L (ref 21–28)
HCO3 BLD-SCNC: 19 MMOL/L (ref 21–28)
HCO3 BLD-SCNC: 21 MMOL/L (ref 21–28)
HCO3 BLD-SCNC: 23 MMOL/L (ref 21–28)
HCO3 BLD-SCNC: 24 MMOL/L (ref 21–28)
HCO3 BLD-SCNC: 25 MMOL/L (ref 21–28)
HCO3 BLD-SCNC: 25 MMOL/L (ref 21–28)
HCO3 BLD-SCNC: 26 MMOL/L (ref 21–28)
HCO3 BLD-SCNC: 27 MMOL/L (ref 21–28)
HCO3 BLD-SCNC: 28 MMOL/L (ref 21–28)
HCO3 BLD-SCNC: 29 MMOL/L (ref 21–28)
HCO3 BLD-SCNC: 9 MMOL/L (ref 21–28)
HCO3 BLDA-SCNC: 11 MMOL/L (ref 21–28)
HCO3 BLDA-SCNC: 15 MMOL/L (ref 21–28)
HCO3 BLDA-SCNC: 15 MMOL/L (ref 21–28)
HCO3 BLDA-SCNC: 27 MMOL/L (ref 21–28)
HCO3 BLDA-SCNC: 28 MMOL/L (ref 21–28)
HCO3 BLDA-SCNC: 28 MMOL/L (ref 21–28)
HCO3 BLDA-SCNC: 30 MMOL/L (ref 21–28)
HCO3 BLDA-SCNC: 32 MMOL/L (ref 21–28)
HCO3 BLDV-SCNC: 16 MMOL/L (ref 21–28)
HCO3 BLDV-SCNC: 17 MMOL/L (ref 21–28)
HCO3 BLDV-SCNC: 23 MMOL/L (ref 21–28)
HCO3 BLDV-SCNC: 29 MMOL/L (ref 21–28)
HCO3 BLDV-SCNC: 30 MMOL/L (ref 21–28)
HCO3 BLDV-SCNC: 30 MMOL/L (ref 21–28)
HCO3 BLDV-SCNC: 31 MMOL/L (ref 21–28)
HCO3 BLDV-SCNC: 33 MMOL/L (ref 21–28)
HCT VFR BLD AUTO: 22.3 % (ref 40–53)
HCT VFR BLD AUTO: 22.7 % (ref 40–53)
HCT VFR BLD AUTO: 23 % (ref 40–53)
HCT VFR BLD AUTO: 23.1 % (ref 40–53)
HCT VFR BLD AUTO: 23.3 % (ref 40–53)
HCT VFR BLD AUTO: 23.4 % (ref 40–53)
HCT VFR BLD AUTO: 23.8 % (ref 40–53)
HCT VFR BLD AUTO: 24.4 % (ref 40–53)
HCT VFR BLD AUTO: 24.5 % (ref 40–53)
HCT VFR BLD AUTO: 24.8 % (ref 40–53)
HCT VFR BLD AUTO: 24.9 % (ref 40–53)
HCT VFR BLD AUTO: 25.5 % (ref 40–53)
HCT VFR BLD AUTO: 25.7 % (ref 40–53)
HCT VFR BLD AUTO: 25.7 % (ref 40–53)
HCT VFR BLD AUTO: 25.8 % (ref 40–53)
HCT VFR BLD AUTO: 25.9 % (ref 40–53)
HCT VFR BLD AUTO: 25.9 % (ref 40–53)
HCT VFR BLD AUTO: 26 % (ref 40–53)
HCT VFR BLD AUTO: 26.5 % (ref 40–53)
HCT VFR BLD AUTO: 27.5 % (ref 40–53)
HCT VFR BLD AUTO: 28.4 % (ref 40–53)
HCT VFR BLD AUTO: 30.8 % (ref 40–53)
HCT VFR BLD AUTO: 31 % (ref 40–53)
HCT VFR BLD AUTO: 32.8 % (ref 40–53)
HCT VFR BLD AUTO: 34.2 % (ref 40–53)
HCT VFR BLD AUTO: 36.4 % (ref 40–53)
HCT VFR BLD AUTO: 37 % (ref 40–53)
HCT VFR BLD AUTO: 38.1 % (ref 40–53)
HCT VFR BLD AUTO: 38.9 % (ref 40–53)
HCT VFR BLD AUTO: 39.3 % (ref 40–53)
HCT VFR BLD AUTO: 40 % (ref 40–53)
HCT VFR BLD AUTO: 41 % (ref 40–53)
HCT VFR BLD AUTO: 41.9 % (ref 40–53)
HCT VFR BLD AUTO: 42.3 % (ref 40–53)
HCT VFR BLD AUTO: 46.4 % (ref 40–53)
HGB BLD-MCNC: 10.3 G/DL (ref 13.3–17.7)
HGB BLD-MCNC: 10.5 G/DL (ref 13.3–17.7)
HGB BLD-MCNC: 10.6 G/DL (ref 13.3–17.7)
HGB BLD-MCNC: 10.6 G/DL (ref 13.3–17.7)
HGB BLD-MCNC: 10.7 G/DL (ref 13.3–17.7)
HGB BLD-MCNC: 10.8 G/DL (ref 13.3–17.7)
HGB BLD-MCNC: 11.1 G/DL (ref 13.3–17.7)
HGB BLD-MCNC: 11.6 G/DL (ref 13.3–17.7)
HGB BLD-MCNC: 12.5 G/DL (ref 13.3–17.7)
HGB BLD-MCNC: 12.9 G/DL (ref 13.3–17.7)
HGB BLD-MCNC: 13.1 G/DL (ref 13.3–17.7)
HGB BLD-MCNC: 13.7 G/DL (ref 13.3–17.7)
HGB BLD-MCNC: 13.8 G/DL (ref 13.3–17.7)
HGB BLD-MCNC: 14.2 G/DL (ref 13.3–17.7)
HGB BLD-MCNC: 14.4 G/DL (ref 13.3–17.7)
HGB BLD-MCNC: 14.4 G/DL (ref 13.3–17.7)
HGB BLD-MCNC: 14.6 G/DL (ref 13.3–17.7)
HGB BLD-MCNC: 14.8 G/DL (ref 13.3–17.7)
HGB BLD-MCNC: 14.9 G/DL (ref 13.3–17.7)
HGB BLD-MCNC: 7.4 G/DL (ref 13.3–17.7)
HGB BLD-MCNC: 7.6 G/DL (ref 13.3–17.7)
HGB BLD-MCNC: 7.8 G/DL (ref 13.3–17.7)
HGB BLD-MCNC: 7.8 G/DL (ref 13.3–17.7)
HGB BLD-MCNC: 7.9 G/DL (ref 13.3–17.7)
HGB BLD-MCNC: 8 G/DL (ref 13.3–17.7)
HGB BLD-MCNC: 8.1 G/DL (ref 13.3–17.7)
HGB BLD-MCNC: 8.2 G/DL (ref 13.3–17.7)
HGB BLD-MCNC: 8.2 G/DL (ref 13.3–17.7)
HGB BLD-MCNC: 8.3 G/DL (ref 13.3–17.7)
HGB BLD-MCNC: 8.4 G/DL (ref 13.3–17.7)
HGB BLD-MCNC: 8.6 G/DL (ref 13.3–17.7)
HGB BLD-MCNC: 8.7 G/DL (ref 13.3–17.7)
HGB BLD-MCNC: 8.7 G/DL (ref 13.3–17.7)
HGB BLD-MCNC: 8.8 G/DL (ref 13.3–17.7)
HGB BLD-MCNC: 8.9 G/DL (ref 13.3–17.7)
HGB BLD-MCNC: 8.9 G/DL (ref 13.3–17.7)
HGB BLD-MCNC: 9 G/DL (ref 13.3–17.7)
HGB BLD-MCNC: 9 G/DL (ref 13.3–17.7)
HGB BLD-MCNC: 9.4 G/DL (ref 13.3–17.7)
HGB BLD-MCNC: 9.7 G/DL (ref 13.3–17.7)
HGB FREE PLAS-MCNC: 30 MG/DL
HGB FREE PLAS-MCNC: 40 MG/DL
HGB FREE PLAS-MCNC: <30 MG/DL
HGB FREE PLAS-MCNC: <30 MG/DL
HGB UR QL STRIP: ABNORMAL
IMM GRANULOCYTES # BLD: 0.4 10E3/UL
IMM GRANULOCYTES NFR BLD: 2 %
INR PPP: 1.29 (ref 0.85–1.15)
INR PPP: 1.29 (ref 0.85–1.15)
INR PPP: 1.33 (ref 0.85–1.15)
INR PPP: 1.33 (ref 0.85–1.15)
INR PPP: 1.35 (ref 0.85–1.15)
INR PPP: 1.41 (ref 0.85–1.15)
INR PPP: 1.46 (ref 0.85–1.15)
INR PPP: 1.5 (ref 0.85–1.15)
INR PPP: 1.54 (ref 0.85–1.15)
INR PPP: 1.56 (ref 0.85–1.15)
INR PPP: 1.56 (ref 0.85–1.15)
INR PPP: 1.57 (ref 0.85–1.15)
INR PPP: 1.57 (ref 0.85–1.15)
INR PPP: 1.59 (ref 0.85–1.15)
INR PPP: 1.6 (ref 0.85–1.15)
INR PPP: 1.63 (ref 0.85–1.15)
INR PPP: 1.63 (ref 0.85–1.15)
INR PPP: 1.65 (ref 0.85–1.15)
INR PPP: 1.67 (ref 0.85–1.15)
INR PPP: 1.72 (ref 0.85–1.15)
INR PPP: 1.73 (ref 0.85–1.15)
INR PPP: 1.75 (ref 0.85–1.15)
INR PPP: 1.89 (ref 0.85–1.15)
INR PPP: 2.06 (ref 0.85–1.15)
INTERPRETATION ECG - MUSE: NORMAL
INTERPRETATION TEGPIA: NORMAL
INTERPRETATION TEGPIA: NORMAL
ISSUE DATE AND TIME: NORMAL
KETONES UR STRIP-MCNC: 10 MG/DL
LACTATE BLD-SCNC: 1 MMOL/L
LACTATE BLD-SCNC: 1.3 MMOL/L
LACTATE BLD-SCNC: 1.4 MMOL/L
LACTATE BLD-SCNC: 1.5 MMOL/L
LACTATE BLD-SCNC: 12.2 MMOL/L
LACTATE BLD-SCNC: 8.2 MMOL/L
LACTATE BLD-SCNC: 8.5 MMOL/L
LACTATE SERPL-SCNC: 0.6 MMOL/L (ref 0.7–2)
LACTATE SERPL-SCNC: 0.7 MMOL/L (ref 0.7–2)
LACTATE SERPL-SCNC: 0.8 MMOL/L (ref 0.7–2)
LACTATE SERPL-SCNC: 0.9 MMOL/L (ref 0.7–2)
LACTATE SERPL-SCNC: 1 MMOL/L (ref 0.7–2)
LACTATE SERPL-SCNC: 1.1 MMOL/L (ref 0.7–2)
LACTATE SERPL-SCNC: 1.2 MMOL/L (ref 0.7–2)
LACTATE SERPL-SCNC: 1.2 MMOL/L (ref 0.7–2)
LACTATE SERPL-SCNC: 1.3 MMOL/L (ref 0.7–2)
LACTATE SERPL-SCNC: 1.6 MMOL/L (ref 0.7–2)
LACTATE SERPL-SCNC: 1.7 MMOL/L (ref 0.7–2)
LACTATE SERPL-SCNC: 1.9 MMOL/L (ref 0.7–2)
LACTATE SERPL-SCNC: 10.9 MMOL/L (ref 0.7–2)
LACTATE SERPL-SCNC: 11.4 MMOL/L (ref 0.7–2)
LACTATE SERPL-SCNC: 12.5 MMOL/L (ref 0.7–2)
LACTATE SERPL-SCNC: 12.9 MMOL/L (ref 0.7–2)
LACTATE SERPL-SCNC: 2.4 MMOL/L (ref 0.7–2)
LACTATE SERPL-SCNC: 2.4 MMOL/L (ref 0.7–2)
LACTATE SERPL-SCNC: 3.4 MMOL/L (ref 0.7–2)
LACTATE SERPL-SCNC: 4.2 MMOL/L (ref 0.7–2)
LACTATE SERPL-SCNC: 6.8 MMOL/L (ref 0.7–2)
LACTATE SERPL-SCNC: 7.3 MMOL/L (ref 0.7–2)
LACTATE SERPL-SCNC: 9.3 MMOL/L (ref 0.7–2)
LDH SERPL L TO P-CCNC: 1217 U/L (ref 85–227)
LDH SERPL L TO P-CCNC: 1302 U/L (ref 85–227)
LDH SERPL L TO P-CCNC: 1654 U/L (ref 85–227)
LDH SERPL L TO P-CCNC: 1721 U/L (ref 85–227)
LEUKOCYTE ESTERASE UR QL STRIP: NEGATIVE
LORAZEPAM SERPL-MCNC: NEGATIVE NG/ML
LVEF ECHO: NORMAL
LYMPHOCYTES # BLD AUTO: 1.7 10E3/UL (ref 0.8–5.3)
LYMPHOCYTES # BLD MANUAL: 8.3 10E3/UL (ref 0.8–5.3)
LYMPHOCYTES NFR BLD AUTO: 7 %
LYMPHOCYTES NFR BLD MANUAL: 63 %
MAGNESIUM SERPL-MCNC: 1.7 MG/DL (ref 1.6–2.3)
MAGNESIUM SERPL-MCNC: 1.9 MG/DL (ref 1.6–2.3)
MAGNESIUM SERPL-MCNC: 2 MG/DL (ref 1.6–2.3)
MAGNESIUM SERPL-MCNC: 2 MG/DL (ref 1.6–2.3)
MAGNESIUM SERPL-MCNC: 2.1 MG/DL (ref 1.6–2.3)
MAGNESIUM SERPL-MCNC: 2.1 MG/DL (ref 1.6–2.3)
MAGNESIUM SERPL-MCNC: 2.2 MG/DL (ref 1.6–2.3)
MAGNESIUM SERPL-MCNC: 2.3 MG/DL (ref 1.6–2.3)
MAGNESIUM SERPL-MCNC: 2.4 MG/DL (ref 1.6–2.3)
MAGNESIUM SERPL-MCNC: 2.5 MG/DL (ref 1.6–2.3)
MAGNESIUM SERPL-MCNC: 2.6 MG/DL (ref 1.6–2.3)
MAGNESIUM SERPL-MCNC: 2.7 MG/DL (ref 1.6–2.3)
MAGNESIUM SERPL-MCNC: 2.8 MG/DL (ref 1.6–2.3)
MAGNESIUM SERPL-MCNC: 2.8 MG/DL (ref 1.6–2.3)
MAGNESIUM SERPL-MCNC: 2.9 MG/DL (ref 1.6–2.3)
MCF P HPASE BLD TEG: 55.9 MM (ref 50–70)
MCF P HPASE BLD TEG: 62.8 MM (ref 50–70)
MCF P HPASE BLD TEG: 63.5 MM (ref 50–70)
MCH RBC QN AUTO: 30.1 PG (ref 26.5–33)
MCH RBC QN AUTO: 30.3 PG (ref 26.5–33)
MCH RBC QN AUTO: 30.5 PG (ref 26.5–33)
MCH RBC QN AUTO: 30.6 PG (ref 26.5–33)
MCH RBC QN AUTO: 30.7 PG (ref 26.5–33)
MCH RBC QN AUTO: 30.8 PG (ref 26.5–33)
MCH RBC QN AUTO: 30.9 PG (ref 26.5–33)
MCH RBC QN AUTO: 31 PG (ref 26.5–33)
MCH RBC QN AUTO: 31.3 PG (ref 26.5–33)
MCH RBC QN AUTO: 31.3 PG (ref 26.5–33)
MCH RBC QN AUTO: 31.4 PG (ref 26.5–33)
MCHC RBC AUTO-ENTMCNC: 31.9 G/DL (ref 31.5–36.5)
MCHC RBC AUTO-ENTMCNC: 33.2 G/DL (ref 31.5–36.5)
MCHC RBC AUTO-ENTMCNC: 33.5 G/DL (ref 31.5–36.5)
MCHC RBC AUTO-ENTMCNC: 33.6 G/DL (ref 31.5–36.5)
MCHC RBC AUTO-ENTMCNC: 33.7 G/DL (ref 31.5–36.5)
MCHC RBC AUTO-ENTMCNC: 33.8 G/DL (ref 31.5–36.5)
MCHC RBC AUTO-ENTMCNC: 33.9 G/DL (ref 31.5–36.5)
MCHC RBC AUTO-ENTMCNC: 34 G/DL (ref 31.5–36.5)
MCHC RBC AUTO-ENTMCNC: 34.1 G/DL (ref 31.5–36.5)
MCHC RBC AUTO-ENTMCNC: 34.2 G/DL (ref 31.5–36.5)
MCHC RBC AUTO-ENTMCNC: 34.3 G/DL (ref 31.5–36.5)
MCHC RBC AUTO-ENTMCNC: 34.4 G/DL (ref 31.5–36.5)
MCHC RBC AUTO-ENTMCNC: 34.5 G/DL (ref 31.5–36.5)
MCHC RBC AUTO-ENTMCNC: 34.6 G/DL (ref 31.5–36.5)
MCHC RBC AUTO-ENTMCNC: 34.9 G/DL (ref 31.5–36.5)
MCHC RBC AUTO-ENTMCNC: 35.1 G/DL (ref 31.5–36.5)
MCHC RBC AUTO-ENTMCNC: 35.1 G/DL (ref 31.5–36.5)
MCHC RBC AUTO-ENTMCNC: 35.2 G/DL (ref 31.5–36.5)
MCHC RBC AUTO-ENTMCNC: 35.5 G/DL (ref 31.5–36.5)
MCHC RBC AUTO-ENTMCNC: 35.6 G/DL (ref 31.5–36.5)
MCV RBC AUTO: 87 FL (ref 78–100)
MCV RBC AUTO: 89 FL (ref 78–100)
MCV RBC AUTO: 90 FL (ref 78–100)
MCV RBC AUTO: 91 FL (ref 78–100)
MCV RBC AUTO: 92 FL (ref 78–100)
MCV RBC AUTO: 95 FL (ref 78–100)
METAMYELOCYTES # BLD MANUAL: 0.3 10E3/UL
METAMYELOCYTES NFR BLD MANUAL: 2 %
MIDAZOLAM SERPL-MCNC: 77.1 NG/ML
MONOCYTES # BLD AUTO: 1.4 10E3/UL (ref 0–1.3)
MONOCYTES # BLD MANUAL: 0.4 10E3/UL (ref 0–1.3)
MONOCYTES NFR BLD AUTO: 6 %
MONOCYTES NFR BLD MANUAL: 3 %
MRSA DNA SPEC QL NAA+PROBE: NEGATIVE
MRSA DNA SPEC QL NAA+PROBE: NEGATIVE
MUCOUS THREADS #/AREA URNS LPF: PRESENT /LPF
NEUTROPHILS # BLD AUTO: 20.6 10E3/UL (ref 1.6–8.3)
NEUTROPHILS # BLD MANUAL: 3.7 10E3/UL (ref 1.6–8.3)
NEUTROPHILS NFR BLD AUTO: 85 %
NEUTROPHILS NFR BLD MANUAL: 28 %
NITRATE UR QL: NEGATIVE
NORCHLORDIAZEP SERPL-MCNC: NEGATIVE NG/ML
NORDIAZEPAM SPEC-MCNC: NEGATIVE NG/ML
NRBC # BLD AUTO: 0 10E3/UL
NRBC BLD AUTO-RTO: 0 /100
NSE SERPL IA-MCNC: 21.6 UG/L
NSE SERPL IA-MCNC: 27.2 UG/L
NSE SERPL IA-MCNC: 66.9 UG/L
O2/TOTAL GAS SETTING VFR VENT: 100 %
O2/TOTAL GAS SETTING VFR VENT: 30 %
O2/TOTAL GAS SETTING VFR VENT: 30 %
O2/TOTAL GAS SETTING VFR VENT: 40 %
O2/TOTAL GAS SETTING VFR VENT: 50 %
O2/TOTAL GAS SETTING VFR VENT: 60 %
O2/TOTAL GAS SETTING VFR VENT: 70 %
OPIATES UR QL SCN: ABNORMAL
OXAZEPAM SERPL CFM-MCNC: NEGATIVE NG/ML
OXYHGB MFR BLD: 100 % (ref 92–100)
OXYHGB MFR BLD: 94 % (ref 92–100)
OXYHGB MFR BLD: 95 % (ref 92–100)
OXYHGB MFR BLD: 96 % (ref 92–100)
OXYHGB MFR BLD: 97 % (ref 92–100)
OXYHGB MFR BLD: 98 % (ref 92–100)
OXYHGB MFR BLD: 99 % (ref 92–100)
OXYHGB MFR BLDA: 92 % (ref 75–100)
OXYHGB MFR BLDA: 94 % (ref 92–100)
OXYHGB MFR BLDA: 95 % (ref 92–100)
OXYHGB MFR BLDA: 96 % (ref 92–100)
OXYHGB MFR BLDA: 96 % (ref 92–100)
OXYHGB MFR BLDA: 97 % (ref 92–100)
OXYHGB MFR BLDA: 98 % (ref 75–100)
OXYHGB MFR BLDA: 99 % (ref 75–100)
OXYHGB MFR BLDV: 57 %
OXYHGB MFR BLDV: 71 %
OXYHGB MFR BLDV: 72 %
OXYHGB MFR BLDV: 74 %
OXYHGB MFR BLDV: 77 %
OXYHGB MFR BLDV: 80 %
OXYHGB MFR BLDV: 82 % (ref 70–75)
P AXIS - MUSE: -10 DEGREES
P AXIS - MUSE: -2 DEGREES
P AXIS - MUSE: -21 DEGREES
P AXIS - MUSE: -21 DEGREES
P AXIS - MUSE: -27 DEGREES
P AXIS - MUSE: -7 DEGREES
P AXIS - MUSE: 0 DEGREES
P AXIS - MUSE: 9 DEGREES
P AXIS - MUSE: NORMAL DEGREES
PA AA BLD-ACNC: 26 %
PA AA BLD-ACNC: 46 %
PA ADP BLD-ACNC: 100 %
PA ADP BLD-ACNC: 94 %
PCO2 BLD: 26 MM HG (ref 35–45)
PCO2 BLD: 29 MM HG (ref 35–45)
PCO2 BLD: 32 MM HG (ref 35–45)
PCO2 BLD: 32 MM HG (ref 35–45)
PCO2 BLD: 33 MM HG (ref 35–45)
PCO2 BLD: 33 MM HG (ref 35–45)
PCO2 BLD: 34 MM HG (ref 35–45)
PCO2 BLD: 35 MM HG (ref 35–45)
PCO2 BLD: 35 MM HG (ref 35–45)
PCO2 BLD: 36 MM HG (ref 35–45)
PCO2 BLD: 36 MM HG (ref 35–45)
PCO2 BLD: 37 MM HG (ref 35–45)
PCO2 BLD: 39 MM HG (ref 35–45)
PCO2 BLD: 39 MM HG (ref 35–45)
PCO2 BLD: 40 MM HG (ref 35–45)
PCO2 BLD: 41 MM HG (ref 35–45)
PCO2 BLD: 42 MM HG (ref 35–45)
PCO2 BLD: 42 MM HG (ref 35–45)
PCO2 BLD: 43 MM HG (ref 35–45)
PCO2 BLD: 44 MM HG (ref 35–45)
PCO2 BLD: 44 MM HG (ref 35–45)
PCO2 BLD: 45 MM HG (ref 35–45)
PCO2 BLD: 51 MM HG (ref 35–45)
PCO2 BLD: 51 MM HG (ref 35–45)
PCO2 BLDA: 32 MM HG (ref 35–45)
PCO2 BLDA: 32 MM HG (ref 35–45)
PCO2 BLDA: 34 MM HG (ref 35–45)
PCO2 BLDA: 39 MM HG (ref 35–45)
PCO2 BLDA: 40 MM HG (ref 35–45)
PCO2 BLDA: 40 MM HG (ref 35–45)
PCO2 BLDA: 43 MM HG (ref 35–45)
PCO2 BLDA: 45 MM HG (ref 35–45)
PCO2 BLDA: 48 MM HG (ref 35–45)
PCO2 BLDA: 57 MM HG (ref 35–45)
PCO2 BLDA: 57 MM HG (ref 35–45)
PCO2 BLDA: 83 MM HG (ref 35–45)
PCO2 BLDV: 37 MM HG (ref 40–50)
PCO2 BLDV: 39 MM HG (ref 40–50)
PCO2 BLDV: 51 MM HG (ref 40–50)
PCO2 BLDV: 56 MM HG (ref 40–50)
PCO2 BLDV: 62 MM HG (ref 40–50)
PCO2 BLDV: 63 MM HG (ref 40–50)
PCO2 BLDV: 79 MM HG (ref 40–50)
PCO2 BLDV: 86 MM HG (ref 40–50)
PH BLD: 7.02 [PH] (ref 7.35–7.45)
PH BLD: 7.29 [PH] (ref 7.35–7.45)
PH BLD: 7.31 [PH] (ref 7.35–7.45)
PH BLD: 7.32 [PH] (ref 7.35–7.45)
PH BLD: 7.32 [PH] (ref 7.35–7.45)
PH BLD: 7.33 [PH] (ref 7.35–7.45)
PH BLD: 7.34 [PH] (ref 7.35–7.45)
PH BLD: 7.35 [PH] (ref 7.35–7.45)
PH BLD: 7.37 [PH] (ref 7.35–7.45)
PH BLD: 7.37 [PH] (ref 7.35–7.45)
PH BLD: 7.38 [PH] (ref 7.35–7.45)
PH BLD: 7.38 [PH] (ref 7.35–7.45)
PH BLD: 7.39 [PH] (ref 7.35–7.45)
PH BLD: 7.4 [PH] (ref 7.35–7.45)
PH BLD: 7.41 [PH] (ref 7.35–7.45)
PH BLD: 7.42 [PH] (ref 7.35–7.45)
PH BLD: 7.42 [PH] (ref 7.35–7.45)
PH BLD: 7.43 [PH] (ref 7.35–7.45)
PH BLD: 7.44 [PH] (ref 7.35–7.45)
PH BLD: 7.45 [PH] (ref 7.35–7.45)
PH BLD: 7.45 [PH] (ref 7.35–7.45)
PH BLD: 7.46 [PH] (ref 7.35–7.45)
PH BLD: 7.47 [PH] (ref 7.35–7.45)
PH BLD: 7.48 [PH] (ref 7.35–7.45)
PH BLD: 7.48 [PH] (ref 7.35–7.45)
PH BLD: 7.49 [PH] (ref 7.35–7.45)
PH BLD: 7.5 [PH] (ref 7.35–7.45)
PH BLDA: 7.11 [PH] (ref 7.35–7.45)
PH BLDA: 7.19 [PH] (ref 7.35–7.45)
PH BLDA: 7.26 [PH] (ref 7.35–7.45)
PH BLDA: 7.27 [PH] (ref 7.35–7.45)
PH BLDA: 7.29 [PH] (ref 7.35–7.45)
PH BLDA: 7.33 [PH] (ref 7.35–7.45)
PH BLDA: 7.38 [PH] (ref 7.35–7.45)
PH BLDA: 7.39 [PH] (ref 7.35–7.45)
PH BLDA: 7.41 [PH] (ref 7.35–7.45)
PH BLDA: 7.45 [PH] (ref 7.35–7.45)
PH BLDA: 7.45 [PH] (ref 7.35–7.45)
PH BLDA: 7.46 [PH] (ref 7.35–7.45)
PH BLDV: 7.07 [PH] (ref 7.32–7.43)
PH BLDV: 7.2 [PH] (ref 7.32–7.43)
PH BLDV: 7.24 [PH] (ref 7.32–7.43)
PH BLDV: 7.25 [PH] (ref 7.32–7.43)
PH BLDV: 7.28 [PH] (ref 7.32–7.43)
PH BLDV: 7.32 [PH] (ref 7.32–7.43)
PH BLDV: 7.34 [PH] (ref 7.32–7.43)
PH BLDV: 7.39 [PH] (ref 7.32–7.43)
PH UR STRIP: 5.5 [PH] (ref 5–7)
PHOSPHATE SERPL-MCNC: 2.2 MG/DL (ref 2.5–4.5)
PHOSPHATE SERPL-MCNC: 3.4 MG/DL (ref 2.5–4.5)
PHOSPHATE SERPL-MCNC: 3.5 MG/DL (ref 2.5–4.5)
PHOSPHATE SERPL-MCNC: 3.5 MG/DL (ref 2.5–4.5)
PHOSPHATE SERPL-MCNC: 3.8 MG/DL (ref 2.5–4.5)
PHOSPHATE SERPL-MCNC: 4 MG/DL (ref 2.5–4.5)
PHOSPHATE SERPL-MCNC: 4.1 MG/DL (ref 2.5–4.5)
PHOSPHATE SERPL-MCNC: 4.2 MG/DL (ref 2.5–4.5)
PHOSPHATE SERPL-MCNC: 4.3 MG/DL (ref 2.5–4.5)
PHOSPHATE SERPL-MCNC: 4.4 MG/DL (ref 2.5–4.5)
PHOSPHATE SERPL-MCNC: 4.5 MG/DL (ref 2.5–4.5)
PHOSPHATE SERPL-MCNC: 4.5 MG/DL (ref 2.5–4.5)
PHOSPHATE SERPL-MCNC: 4.7 MG/DL (ref 2.5–4.5)
PHOSPHATE SERPL-MCNC: 5.1 MG/DL (ref 2.5–4.5)
PHOSPHATE SERPL-MCNC: 5.3 MG/DL (ref 2.5–4.5)
PHOSPHATE SERPL-MCNC: 5.3 MG/DL (ref 2.5–4.5)
PHOSPHATE SERPL-MCNC: 5.5 MG/DL (ref 2.5–4.5)
PLAT MORPH BLD: ABNORMAL
PLATELET # BLD AUTO: 103 10E3/UL (ref 150–450)
PLATELET # BLD AUTO: 104 10E3/UL (ref 150–450)
PLATELET # BLD AUTO: 104 10E3/UL (ref 150–450)
PLATELET # BLD AUTO: 106 10E3/UL (ref 150–450)
PLATELET # BLD AUTO: 108 10E3/UL (ref 150–450)
PLATELET # BLD AUTO: 109 10E3/UL (ref 150–450)
PLATELET # BLD AUTO: 112 10E3/UL (ref 150–450)
PLATELET # BLD AUTO: 115 10E3/UL (ref 150–450)
PLATELET # BLD AUTO: 117 10E3/UL (ref 150–450)
PLATELET # BLD AUTO: 118 10E3/UL (ref 150–450)
PLATELET # BLD AUTO: 119 10E3/UL (ref 150–450)
PLATELET # BLD AUTO: 129 10E3/UL (ref 150–450)
PLATELET # BLD AUTO: 131 10E3/UL (ref 150–450)
PLATELET # BLD AUTO: 134 10E3/UL (ref 150–450)
PLATELET # BLD AUTO: 138 10E3/UL (ref 150–450)
PLATELET # BLD AUTO: 141 10E3/UL (ref 150–450)
PLATELET # BLD AUTO: 141 10E3/UL (ref 150–450)
PLATELET # BLD AUTO: 146 10E3/UL (ref 150–450)
PLATELET # BLD AUTO: 156 10E3/UL (ref 150–450)
PLATELET # BLD AUTO: 157 10E3/UL (ref 150–450)
PLATELET # BLD AUTO: 158 10E3/UL (ref 150–450)
PLATELET # BLD AUTO: 158 10E3/UL (ref 150–450)
PLATELET # BLD AUTO: 164 10E3/UL (ref 150–450)
PLATELET # BLD AUTO: 166 10E3/UL (ref 150–450)
PLATELET # BLD AUTO: 167 10E3/UL (ref 150–450)
PLATELET # BLD AUTO: 167 10E3/UL (ref 150–450)
PLATELET # BLD AUTO: 169 10E3/UL (ref 150–450)
PLATELET # BLD AUTO: 196 10E3/UL (ref 150–450)
PLATELET # BLD AUTO: 200 10E3/UL (ref 150–450)
PLATELET # BLD AUTO: 208 10E3/UL (ref 150–450)
PLATELET # BLD AUTO: 224 10E3/UL (ref 150–450)
PLATELET # BLD AUTO: 87 10E3/UL (ref 150–450)
PLATELET # BLD AUTO: 96 10E3/UL (ref 150–450)
PLATELET # BLD AUTO: 97 10E3/UL (ref 150–450)
PLATELET # BLD AUTO: 99 10E3/UL (ref 150–450)
PO2 BLD: 103 MM HG (ref 80–105)
PO2 BLD: 103 MM HG (ref 80–105)
PO2 BLD: 104 MM HG (ref 80–105)
PO2 BLD: 104 MM HG (ref 80–105)
PO2 BLD: 109 MM HG (ref 80–105)
PO2 BLD: 110 MM HG (ref 80–105)
PO2 BLD: 112 MM HG (ref 80–105)
PO2 BLD: 113 MM HG (ref 80–105)
PO2 BLD: 118 MM HG (ref 80–105)
PO2 BLD: 119 MM HG (ref 80–105)
PO2 BLD: 121 MM HG (ref 80–105)
PO2 BLD: 123 MM HG (ref 80–105)
PO2 BLD: 123 MM HG (ref 80–105)
PO2 BLD: 124 MM HG (ref 80–105)
PO2 BLD: 125 MM HG (ref 80–105)
PO2 BLD: 126 MM HG (ref 80–105)
PO2 BLD: 126 MM HG (ref 80–105)
PO2 BLD: 134 MM HG (ref 80–105)
PO2 BLD: 136 MM HG (ref 80–105)
PO2 BLD: 137 MM HG (ref 80–105)
PO2 BLD: 137 MM HG (ref 80–105)
PO2 BLD: 143 MM HG (ref 80–105)
PO2 BLD: 144 MM HG (ref 80–105)
PO2 BLD: 145 MM HG (ref 80–105)
PO2 BLD: 146 MM HG (ref 80–105)
PO2 BLD: 157 MM HG (ref 80–105)
PO2 BLD: 159 MM HG (ref 80–105)
PO2 BLD: 159 MM HG (ref 80–105)
PO2 BLD: 182 MM HG (ref 80–105)
PO2 BLD: 201 MM HG (ref 80–105)
PO2 BLD: 201 MM HG (ref 80–105)
PO2 BLD: 202 MM HG (ref 80–105)
PO2 BLD: 207 MM HG (ref 80–105)
PO2 BLD: 278 MM HG (ref 80–105)
PO2 BLD: 66 MM HG (ref 80–105)
PO2 BLD: 72 MM HG (ref 80–105)
PO2 BLD: 78 MM HG (ref 80–105)
PO2 BLD: 80 MM HG (ref 80–105)
PO2 BLD: 85 MM HG (ref 80–105)
PO2 BLD: 87 MM HG (ref 80–105)
PO2 BLD: 88 MM HG (ref 80–105)
PO2 BLD: 90 MM HG (ref 80–105)
PO2 BLD: 91 MM HG (ref 80–105)
PO2 BLD: 96 MM HG (ref 80–105)
PO2 BLDA: 130 MM HG (ref 80–105)
PO2 BLDA: 141 MM HG (ref 80–105)
PO2 BLDA: 203 MM HG (ref 80–105)
PO2 BLDA: 226 MM HG (ref 80–105)
PO2 BLDA: 267 MM HG (ref 80–105)
PO2 BLDA: 284 MM HG (ref 80–105)
PO2 BLDA: 287 MM HG (ref 80–105)
PO2 BLDA: 67 MM HG (ref 80–105)
PO2 BLDA: 75 MM HG (ref 80–105)
PO2 BLDA: 84 MM HG (ref 80–105)
PO2 BLDA: 86 MM HG (ref 80–105)
PO2 BLDA: 90 MM HG (ref 80–105)
PO2 BLDV: 22 MM HG (ref 25–47)
PO2 BLDV: 32 MM HG (ref 25–47)
PO2 BLDV: 39 MM HG (ref 25–47)
PO2 BLDV: 39 MM HG (ref 25–47)
PO2 BLDV: 41 MM HG (ref 25–47)
PO2 BLDV: 49 MM HG (ref 25–47)
PO2 BLDV: 51 MM HG (ref 25–47)
PO2 BLDV: 52 MM HG (ref 25–47)
POTASSIUM BLD-SCNC: 2.6 MMOL/L (ref 3.4–5.3)
POTASSIUM BLD-SCNC: 2.7 MMOL/L (ref 3.4–5.3)
POTASSIUM BLD-SCNC: 2.9 MMOL/L (ref 3.4–5.3)
POTASSIUM BLD-SCNC: 3 MMOL/L (ref 3.4–5.3)
POTASSIUM BLD-SCNC: 3.1 MMOL/L (ref 3.5–5)
POTASSIUM BLD-SCNC: 3.7 MMOL/L (ref 3.4–5.3)
POTASSIUM BLD-SCNC: 3.8 MMOL/L (ref 3.4–5.3)
POTASSIUM BLD-SCNC: 3.9 MMOL/L (ref 3.4–5.3)
POTASSIUM BLD-SCNC: 4 MMOL/L (ref 3.4–5.3)
POTASSIUM BLD-SCNC: 4 MMOL/L (ref 3.5–5)
POTASSIUM BLD-SCNC: 4.1 MMOL/L (ref 3.4–5.3)
POTASSIUM BLD-SCNC: 4.1 MMOL/L (ref 3.5–5)
POTASSIUM BLD-SCNC: 4.2 MMOL/L (ref 3.4–5.3)
POTASSIUM BLD-SCNC: 4.3 MMOL/L (ref 3.4–5.3)
POTASSIUM BLD-SCNC: 4.3 MMOL/L (ref 3.4–5.3)
POTASSIUM BLD-SCNC: 4.7 MMOL/L (ref 3.4–5.3)
POTASSIUM BLD-SCNC: 4.7 MMOL/L (ref 3.4–5.3)
POTASSIUM BLD-SCNC: 4.8 MMOL/L (ref 3.4–5.3)
POTASSIUM BLD-SCNC: 4.9 MMOL/L (ref 3.4–5.3)
POTASSIUM BLD-SCNC: 5 MMOL/L (ref 3.4–5.3)
POTASSIUM BLD-SCNC: 5.1 MMOL/L (ref 3.4–5.3)
POTASSIUM BLD-SCNC: 5.2 MMOL/L (ref 3.4–5.3)
POTASSIUM BLD-SCNC: 5.3 MMOL/L (ref 3.4–5.3)
POTASSIUM BLD-SCNC: 5.7 MMOL/L (ref 3.4–5.3)
POTASSIUM BLD-SCNC: 6 MMOL/L (ref 3.4–5.3)
PR INTERVAL - MUSE: 102 MS
PR INTERVAL - MUSE: 120 MS
PR INTERVAL - MUSE: 124 MS
PR INTERVAL - MUSE: 124 MS
PR INTERVAL - MUSE: 126 MS
PR INTERVAL - MUSE: 134 MS
PR INTERVAL - MUSE: 150 MS
PR INTERVAL - MUSE: 98 MS
PR INTERVAL - MUSE: NORMAL MS
PROCALCITONIN SERPL-MCNC: 2.09 NG/ML
PROCALCITONIN SERPL-MCNC: 28.85 NG/ML
PROT SERPL-MCNC: 4.6 G/DL (ref 6.8–8.8)
PROT SERPL-MCNC: 5.4 G/DL (ref 6.8–8.8)
PROT SERPL-MCNC: 5.5 G/DL (ref 6.8–8.8)
PROT SERPL-MCNC: 5.6 G/DL (ref 6.8–8.8)
PROT SERPL-MCNC: 5.7 G/DL (ref 6.8–8.8)
PROT SERPL-MCNC: 5.9 G/DL (ref 6.8–8.8)
PROT SERPL-MCNC: 5.9 G/DL (ref 6.8–8.8)
PROT SERPL-MCNC: 6 G/DL (ref 6.8–8.8)
PROT SERPL-MCNC: 6.1 G/DL (ref 6.8–8.8)
PROT SERPL-MCNC: 6.2 G/DL (ref 6.8–8.8)
PROT SERPL-MCNC: 6.2 G/DL (ref 6.8–8.8)
PROT SERPL-MCNC: 6.4 G/DL (ref 6.8–8.8)
QRS DURATION - MUSE: 130 MS
QRS DURATION - MUSE: 130 MS
QRS DURATION - MUSE: 136 MS
QRS DURATION - MUSE: 142 MS
QRS DURATION - MUSE: 144 MS
QRS DURATION - MUSE: 148 MS
QRS DURATION - MUSE: 152 MS
QRS DURATION - MUSE: 152 MS
QRS DURATION - MUSE: 154 MS
QT - MUSE: 420 MS
QT - MUSE: 428 MS
QT - MUSE: 428 MS
QT - MUSE: 438 MS
QT - MUSE: 450 MS
QT - MUSE: 466 MS
QT - MUSE: 506 MS
QT - MUSE: 516 MS
QT - MUSE: 570 MS
QTC - MUSE: 502 MS
QTC - MUSE: 512 MS
QTC - MUSE: 520 MS
QTC - MUSE: 531 MS
QTC - MUSE: 547 MS
QTC - MUSE: 552 MS
QTC - MUSE: 573 MS
QTC - MUSE: 602 MS
QTC - MUSE: 627 MS
R AXIS - MUSE: -1 DEGREES
R AXIS - MUSE: -14 DEGREES
R AXIS - MUSE: -3 DEGREES
R AXIS - MUSE: -3 DEGREES
R AXIS - MUSE: -4 DEGREES
R AXIS - MUSE: -7 DEGREES
R AXIS - MUSE: 2 DEGREES
R AXIS - MUSE: 33 DEGREES
R AXIS - MUSE: 8 DEGREES
RBC # BLD AUTO: 2.44 10E6/UL (ref 4.4–5.9)
RBC # BLD AUTO: 2.48 10E6/UL (ref 4.4–5.9)
RBC # BLD AUTO: 2.52 10E6/UL (ref 4.4–5.9)
RBC # BLD AUTO: 2.54 10E6/UL (ref 4.4–5.9)
RBC # BLD AUTO: 2.55 10E6/UL (ref 4.4–5.9)
RBC # BLD AUTO: 2.55 10E6/UL (ref 4.4–5.9)
RBC # BLD AUTO: 2.63 10E6/UL (ref 4.4–5.9)
RBC # BLD AUTO: 2.71 10E6/UL (ref 4.4–5.9)
RBC # BLD AUTO: 2.75 10E6/UL (ref 4.4–5.9)
RBC # BLD AUTO: 2.84 10E6/UL (ref 4.4–5.9)
RBC # BLD AUTO: 2.84 10E6/UL (ref 4.4–5.9)
RBC # BLD AUTO: 2.86 10E6/UL (ref 4.4–5.9)
RBC # BLD AUTO: 2.86 10E6/UL (ref 4.4–5.9)
RBC # BLD AUTO: 2.87 10E6/UL (ref 4.4–5.9)
RBC # BLD AUTO: 2.87 10E6/UL (ref 4.4–5.9)
RBC # BLD AUTO: 2.9 10E6/UL (ref 4.4–5.9)
RBC # BLD AUTO: 2.97 10E6/UL (ref 4.4–5.9)
RBC # BLD AUTO: 3.07 10E6/UL (ref 4.4–5.9)
RBC # BLD AUTO: 3.18 10E6/UL (ref 4.4–5.9)
RBC # BLD AUTO: 3.44 10E6/UL (ref 4.4–5.9)
RBC # BLD AUTO: 3.45 10E6/UL (ref 4.4–5.9)
RBC # BLD AUTO: 3.66 10E6/UL (ref 4.4–5.9)
RBC # BLD AUTO: 3.76 10E6/UL (ref 4.4–5.9)
RBC # BLD AUTO: 4 10E6/UL (ref 4.4–5.9)
RBC # BLD AUTO: 4.17 10E6/UL (ref 4.4–5.9)
RBC # BLD AUTO: 4.3 10E6/UL (ref 4.4–5.9)
RBC # BLD AUTO: 4.48 10E6/UL (ref 4.4–5.9)
RBC # BLD AUTO: 4.5 10E6/UL (ref 4.4–5.9)
RBC # BLD AUTO: 4.6 10E6/UL (ref 4.4–5.9)
RBC # BLD AUTO: 4.71 10E6/UL (ref 4.4–5.9)
RBC # BLD AUTO: 4.75 10E6/UL (ref 4.4–5.9)
RBC # BLD AUTO: 4.8 10E6/UL (ref 4.4–5.9)
RBC # BLD AUTO: 4.89 10E6/UL (ref 4.4–5.9)
RBC MORPH BLD: ABNORMAL
RBC URINE: 18 /HPF
S100 CA BINDING PROTEIN B SER-MCNC: 1676 NG/L
S100 CA BINDING PROTEIN B SER-MCNC: 933 NG/L
SA TARGET DNA: NEGATIVE
SA TARGET DNA: NEGATIVE
SAO2 % BLDV: 20 % (ref 94–100)
SARS-COV-2 RNA RESP QL NAA+PROBE: NEGATIVE
SODIUM BLD-SCNC: 137 MMOL/L (ref 133–144)
SODIUM BLD-SCNC: 138 MMOL/L (ref 133–144)
SODIUM BLD-SCNC: 138 MMOL/L (ref 133–144)
SODIUM BLD-SCNC: 139 MMOL/L (ref 133–144)
SODIUM BLD-SCNC: 142 MMOL/L (ref 133–144)
SODIUM SERPL-SCNC: 138 MMOL/L (ref 133–144)
SODIUM SERPL-SCNC: 138 MMOL/L (ref 133–144)
SODIUM SERPL-SCNC: 139 MMOL/L (ref 133–144)
SODIUM SERPL-SCNC: 140 MMOL/L (ref 133–144)
SODIUM SERPL-SCNC: 141 MMOL/L (ref 133–144)
SODIUM SERPL-SCNC: 142 MMOL/L (ref 133–144)
SODIUM SERPL-SCNC: 143 MMOL/L (ref 133–144)
SODIUM SERPL-SCNC: 144 MMOL/L (ref 133–144)
SODIUM SERPL-SCNC: 145 MMOL/L (ref 133–144)
SODIUM SERPL-SCNC: 146 MMOL/L (ref 133–144)
SODIUM SERPL-SCNC: 147 MMOL/L (ref 133–144)
SODIUM SERPL-SCNC: 148 MMOL/L (ref 133–144)
SP GR UR STRIP: 1.02 (ref 1–1.03)
SPECIMEN EXPIRATION DATE: NORMAL
SQUAMOUS EPITHELIAL: 1 /HPF
SYSTOLIC BLOOD PRESSURE - MUSE: NORMAL MMHG
T AXIS - MUSE: 134 DEGREES
T AXIS - MUSE: 140 DEGREES
T AXIS - MUSE: 151 DEGREES
T AXIS - MUSE: 153 DEGREES
T AXIS - MUSE: 154 DEGREES
T AXIS - MUSE: 163 DEGREES
T AXIS - MUSE: 165 DEGREES
T AXIS - MUSE: 165 DEGREES
T AXIS - MUSE: 180 DEGREES
TEMAZEPAM SERPL-MCNC: NEGATIVE NG/ML
TRANSITIONAL EPI: 1 /HPF
TRIAZOLAM SPEC-MCNC: NEGATIVE NG/ML
TROPONIN I SERPL-MCNC: 0.18 UG/L (ref 0–0.04)
TROPONIN I SERPL-MCNC: 108.26 UG/L (ref 0–0.04)
TROPONIN I SERPL-MCNC: 119.12 UG/L (ref 0–0.04)
TROPONIN I SERPL-MCNC: 136.24 UG/L (ref 0–0.04)
TROPONIN I SERPL-MCNC: 138.94 UG/L (ref 0–0.04)
TROPONIN I SERPL-MCNC: 199.07 UG/L (ref 0–0.04)
TROPONIN I SERPL-MCNC: 32.92 UG/L (ref 0–0.04)
TROPONIN I SERPL-MCNC: 68.94 UG/L (ref 0–0.04)
TROPONIN I SERPL-MCNC: 77.28 UG/L (ref 0–0.04)
TROPONIN I SERPL-MCNC: 93.85 UG/L (ref 0–0.04)
TROPONIN I SERPL-MCNC: >200 UG/L (ref 0–0.04)
UFH PPP CHRO-ACNC: 0.11 IU/ML
UFH PPP CHRO-ACNC: 0.11 IU/ML
UFH PPP CHRO-ACNC: 0.16 IU/ML
UFH PPP CHRO-ACNC: 0.34 IU/ML
UFH PPP CHRO-ACNC: 0.36 IU/ML
UFH PPP CHRO-ACNC: 0.37 IU/ML
UFH PPP CHRO-ACNC: 0.39 IU/ML
UFH PPP CHRO-ACNC: 0.43 IU/ML
UFH PPP CHRO-ACNC: 0.45 IU/ML
UFH PPP CHRO-ACNC: 0.46 IU/ML
UFH PPP CHRO-ACNC: 0.47 IU/ML
UFH PPP CHRO-ACNC: 0.53 IU/ML
UFH PPP CHRO-ACNC: 0.73 IU/ML
UFH PPP CHRO-ACNC: 0.79 IU/ML
UFH PPP CHRO-ACNC: 1.08 IU/ML
UFH PPP CHRO-ACNC: <0.1 IU/ML
UNIT ABO/RH: NORMAL
UNIT ABO/RH: NORMAL
UNIT NUMBER: NORMAL
UNIT NUMBER: NORMAL
UNIT STATUS: NORMAL
UNIT STATUS: NORMAL
UNIT TYPE ISBT: 7300
UNIT TYPE ISBT: 7300
UROBILINOGEN UR STRIP-MCNC: NORMAL MG/DL
VENTRICULAR RATE- MUSE: 69 BPM
VENTRICULAR RATE- MUSE: 73 BPM
VENTRICULAR RATE- MUSE: 84 BPM
VENTRICULAR RATE- MUSE: 85 BPM
VENTRICULAR RATE- MUSE: 86 BPM
VENTRICULAR RATE- MUSE: 86 BPM
VENTRICULAR RATE- MUSE: 89 BPM
VENTRICULAR RATE- MUSE: 91 BPM
VENTRICULAR RATE- MUSE: 94 BPM
WBC # BLD AUTO: 11.9 10E3/UL (ref 4–11)
WBC # BLD AUTO: 12.2 10E3/UL (ref 4–11)
WBC # BLD AUTO: 12.6 10E3/UL (ref 4–11)
WBC # BLD AUTO: 13.1 10E3/UL (ref 4–11)
WBC # BLD AUTO: 13.2 10E3/UL (ref 4–11)
WBC # BLD AUTO: 13.7 10E3/UL (ref 4–11)
WBC # BLD AUTO: 13.7 10E3/UL (ref 4–11)
WBC # BLD AUTO: 14.1 10E3/UL (ref 4–11)
WBC # BLD AUTO: 14.5 10E3/UL (ref 4–11)
WBC # BLD AUTO: 14.5 10E3/UL (ref 4–11)
WBC # BLD AUTO: 14.8 10E3/UL (ref 4–11)
WBC # BLD AUTO: 14.8 10E3/UL (ref 4–11)
WBC # BLD AUTO: 14.9 10E3/UL (ref 4–11)
WBC # BLD AUTO: 14.9 10E3/UL (ref 4–11)
WBC # BLD AUTO: 15.1 10E3/UL (ref 4–11)
WBC # BLD AUTO: 15.9 10E3/UL (ref 4–11)
WBC # BLD AUTO: 16.1 10E3/UL (ref 4–11)
WBC # BLD AUTO: 16.3 10E3/UL (ref 4–11)
WBC # BLD AUTO: 16.5 10E3/UL (ref 4–11)
WBC # BLD AUTO: 16.7 10E3/UL (ref 4–11)
WBC # BLD AUTO: 17.9 10E3/UL (ref 4–11)
WBC # BLD AUTO: 18.7 10E3/UL (ref 4–11)
WBC # BLD AUTO: 19 10E3/UL (ref 4–11)
WBC # BLD AUTO: 19.3 10E3/UL (ref 4–11)
WBC # BLD AUTO: 20.3 10E3/UL (ref 4–11)
WBC # BLD AUTO: 20.9 10E3/UL (ref 4–11)
WBC # BLD AUTO: 24 10E3/UL (ref 4–11)
WBC # BLD AUTO: 24.3 10E3/UL (ref 4–11)
WBC # BLD AUTO: 24.3 10E3/UL (ref 4–11)
WBC # BLD AUTO: 24.5 10E3/UL (ref 4–11)
WBC # BLD AUTO: 25 10E3/UL (ref 4–11)
WBC # BLD AUTO: 28.7 10E3/UL (ref 4–11)
WBC # BLD AUTO: 28.9 10E3/UL (ref 4–11)
WBC # BLD AUTO: 29.6 10E3/UL (ref 4–11)
WBC # BLD AUTO: 31 10E3/UL (ref 4–11)
WBC URINE: 23 /HPF

## 2021-01-01 PROCEDURE — 85520 HEPARIN ASSAY: CPT | Performed by: PHYSICIAN ASSISTANT

## 2021-01-01 PROCEDURE — 272N000237 HC CARDIOHELP CIRCUIT

## 2021-01-01 PROCEDURE — 02HV33Z INSERTION OF INFUSION DEVICE INTO SUPERIOR VENA CAVA, PERCUTANEOUS APPROACH: ICD-10-PCS | Performed by: SURGERY

## 2021-01-01 PROCEDURE — 250N000011 HC RX IP 250 OP 636: Performed by: NURSE PRACTITIONER

## 2021-01-01 PROCEDURE — 250N000011 HC RX IP 250 OP 636: Performed by: INTERNAL MEDICINE

## 2021-01-01 PROCEDURE — 250N000013 HC RX MED GY IP 250 OP 250 PS 637: Performed by: NURSE PRACTITIONER

## 2021-01-01 PROCEDURE — 82947 ASSAY GLUCOSE BLOOD QUANT: CPT | Performed by: PHYSICIAN ASSISTANT

## 2021-01-01 PROCEDURE — 70450 CT HEAD/BRAIN W/O DYE: CPT

## 2021-01-01 PROCEDURE — 84295 ASSAY OF SERUM SODIUM: CPT | Performed by: STUDENT IN AN ORGANIZED HEALTH CARE EDUCATION/TRAINING PROGRAM

## 2021-01-01 PROCEDURE — 71045 X-RAY EXAM CHEST 1 VIEW: CPT | Mod: 26 | Performed by: RADIOLOGY

## 2021-01-01 PROCEDURE — 99233 SBSQ HOSP IP/OBS HIGH 50: CPT | Performed by: NURSE PRACTITIONER

## 2021-01-01 PROCEDURE — C1758 CATHETER, URETERAL: HCPCS | Performed by: SURGERY

## 2021-01-01 PROCEDURE — 99232 SBSQ HOSP IP/OBS MODERATE 35: CPT | Mod: 25 | Performed by: PSYCHIATRY & NEUROLOGY

## 2021-01-01 PROCEDURE — 84100 ASSAY OF PHOSPHORUS: CPT | Performed by: INTERNAL MEDICINE

## 2021-01-01 PROCEDURE — 250N000011 HC RX IP 250 OP 636: Performed by: STUDENT IN AN ORGANIZED HEALTH CARE EDUCATION/TRAINING PROGRAM

## 2021-01-01 PROCEDURE — 250N000013 HC RX MED GY IP 250 OP 250 PS 637: Performed by: STUDENT IN AN ORGANIZED HEALTH CARE EDUCATION/TRAINING PROGRAM

## 2021-01-01 PROCEDURE — 93005 ELECTROCARDIOGRAM TRACING: CPT

## 2021-01-01 PROCEDURE — 85520 HEPARIN ASSAY: CPT | Performed by: STUDENT IN AN ORGANIZED HEALTH CARE EDUCATION/TRAINING PROGRAM

## 2021-01-01 PROCEDURE — 85027 COMPLETE CBC AUTOMATED: CPT | Performed by: INTERNAL MEDICINE

## 2021-01-01 PROCEDURE — 85347 COAGULATION TIME ACTIVATED: CPT

## 2021-01-01 PROCEDURE — 93308 TTE F-UP OR LMTD: CPT | Mod: 26 | Performed by: STUDENT IN AN ORGANIZED HEALTH CARE EDUCATION/TRAINING PROGRAM

## 2021-01-01 PROCEDURE — 83036 HEMOGLOBIN GLYCOSYLATED A1C: CPT | Performed by: PHYSICIAN ASSISTANT

## 2021-01-01 PROCEDURE — 71045 X-RAY EXAM CHEST 1 VIEW: CPT

## 2021-01-01 PROCEDURE — 258N000003 HC RX IP 258 OP 636: Performed by: INTERNAL MEDICINE

## 2021-01-01 PROCEDURE — 82533 TOTAL CORTISOL: CPT | Performed by: PHYSICIAN ASSISTANT

## 2021-01-01 PROCEDURE — 33947 ECMO/ECLS INITIATION ARTERY: CPT

## 2021-01-01 PROCEDURE — 84155 ASSAY OF PROTEIN SERUM: CPT | Performed by: PHYSICIAN ASSISTANT

## 2021-01-01 PROCEDURE — 82803 BLOOD GASES ANY COMBINATION: CPT | Performed by: STUDENT IN AN ORGANIZED HEALTH CARE EDUCATION/TRAINING PROGRAM

## 2021-01-01 PROCEDURE — 250N000011 HC RX IP 250 OP 636: Performed by: SURGERY

## 2021-01-01 PROCEDURE — 82805 BLOOD GASES W/O2 SATURATION: CPT | Performed by: PHYSICIAN ASSISTANT

## 2021-01-01 PROCEDURE — 3E043XZ INTRODUCTION OF VASOPRESSOR INTO CENTRAL VEIN, PERCUTANEOUS APPROACH: ICD-10-PCS | Performed by: PHYSICIAN ASSISTANT

## 2021-01-01 PROCEDURE — 250N000009 HC RX 250: Performed by: INTERNAL MEDICINE

## 2021-01-01 PROCEDURE — 82805 BLOOD GASES W/O2 SATURATION: CPT | Performed by: STUDENT IN AN ORGANIZED HEALTH CARE EDUCATION/TRAINING PROGRAM

## 2021-01-01 PROCEDURE — 83735 ASSAY OF MAGNESIUM: CPT | Performed by: PHYSICIAN ASSISTANT

## 2021-01-01 PROCEDURE — 999N000157 HC STATISTIC RCP TIME EA 10 MIN

## 2021-01-01 PROCEDURE — 999N000015 HC STATISTIC ARTERIAL MONITORING DAILY

## 2021-01-01 PROCEDURE — 33949 ECMO/ECLS DAILY MGMT ARTERY: CPT | Performed by: INTERNAL MEDICINE

## 2021-01-01 PROCEDURE — 999N000185 HC STATISTIC TRANSPORT TIME EA 15 MIN

## 2021-01-01 PROCEDURE — 999N000077 HC STATISTIC INSERT IABP

## 2021-01-01 PROCEDURE — 99233 SBSQ HOSP IP/OBS HIGH 50: CPT | Mod: 24 | Performed by: INTERNAL MEDICINE

## 2021-01-01 PROCEDURE — 85652 RBC SED RATE AUTOMATED: CPT | Performed by: PHYSICIAN ASSISTANT

## 2021-01-01 PROCEDURE — 99291 CRITICAL CARE FIRST HOUR: CPT | Mod: GC | Performed by: INTERNAL MEDICINE

## 2021-01-01 PROCEDURE — 82550 ASSAY OF CK (CPK): CPT | Performed by: STUDENT IN AN ORGANIZED HEALTH CARE EDUCATION/TRAINING PROGRAM

## 2021-01-01 PROCEDURE — 85379 FIBRIN DEGRADATION QUANT: CPT | Performed by: PHYSICIAN ASSISTANT

## 2021-01-01 PROCEDURE — 85730 THROMBOPLASTIN TIME PARTIAL: CPT | Performed by: PHYSICIAN ASSISTANT

## 2021-01-01 PROCEDURE — 85014 HEMATOCRIT: CPT | Performed by: PHYSICIAN ASSISTANT

## 2021-01-01 PROCEDURE — 84295 ASSAY OF SERUM SODIUM: CPT | Performed by: PHYSICIAN ASSISTANT

## 2021-01-01 PROCEDURE — 250N000013 HC RX MED GY IP 250 OP 250 PS 637: Performed by: PHYSICIAN ASSISTANT

## 2021-01-01 PROCEDURE — 82330 ASSAY OF CALCIUM: CPT

## 2021-01-01 PROCEDURE — 200N000002 HC R&B ICU UMMC

## 2021-01-01 PROCEDURE — 33984 ECMO/ECLS RMVL PRPH CANNULA: CPT | Performed by: SURGERY

## 2021-01-01 PROCEDURE — 74018 RADEX ABDOMEN 1 VIEW: CPT | Mod: 26 | Performed by: STUDENT IN AN ORGANIZED HEALTH CARE EDUCATION/TRAINING PROGRAM

## 2021-01-01 PROCEDURE — 84100 ASSAY OF PHOSPHORUS: CPT | Performed by: PHYSICIAN ASSISTANT

## 2021-01-01 PROCEDURE — P9059 PLASMA, FRZ BETWEEN 8-24HOUR: HCPCS | Performed by: STUDENT IN AN ORGANIZED HEALTH CARE EDUCATION/TRAINING PROGRAM

## 2021-01-01 PROCEDURE — 71275 CT ANGIOGRAPHY CHEST: CPT

## 2021-01-01 PROCEDURE — 82803 BLOOD GASES ANY COMBINATION: CPT | Performed by: PHYSICIAN ASSISTANT

## 2021-01-01 PROCEDURE — 999N000065 XR CHEST PORT 1 VIEW

## 2021-01-01 PROCEDURE — 36415 COLL VENOUS BLD VENIPUNCTURE: CPT | Performed by: INTERNAL MEDICINE

## 2021-01-01 PROCEDURE — 83605 ASSAY OF LACTIC ACID: CPT | Performed by: PHYSICIAN ASSISTANT

## 2021-01-01 PROCEDURE — 85396 CLOTTING ASSAY WHOLE BLOOD: CPT | Performed by: PHYSICIAN ASSISTANT

## 2021-01-01 PROCEDURE — 87641 MR-STAPH DNA AMP PROBE: CPT | Performed by: STUDENT IN AN ORGANIZED HEALTH CARE EDUCATION/TRAINING PROGRAM

## 2021-01-01 PROCEDURE — 80307 DRUG TEST PRSMV CHEM ANLYZR: CPT | Performed by: PHYSICIAN ASSISTANT

## 2021-01-01 PROCEDURE — 84450 TRANSFERASE (AST) (SGOT): CPT | Performed by: PHYSICIAN ASSISTANT

## 2021-01-01 PROCEDURE — 83051 HEMOGLOBIN PLASMA: CPT | Performed by: PHYSICIAN ASSISTANT

## 2021-01-01 PROCEDURE — 85018 HEMOGLOBIN: CPT | Performed by: INTERNAL MEDICINE

## 2021-01-01 PROCEDURE — 82330 ASSAY OF CALCIUM: CPT | Performed by: INTERNAL MEDICINE

## 2021-01-01 PROCEDURE — 250N000009 HC RX 250: Performed by: NURSE PRACTITIONER

## 2021-01-01 PROCEDURE — 99285 EMERGENCY DEPT VISIT HI MDM: CPT | Mod: 25

## 2021-01-01 PROCEDURE — 85384 FIBRINOGEN ACTIVITY: CPT | Performed by: PHYSICIAN ASSISTANT

## 2021-01-01 PROCEDURE — 80053 COMPREHEN METABOLIC PANEL: CPT | Performed by: PHYSICIAN ASSISTANT

## 2021-01-01 PROCEDURE — 85300 ANTITHROMBIN III ACTIVITY: CPT | Performed by: PHYSICIAN ASSISTANT

## 2021-01-01 PROCEDURE — 83735 ASSAY OF MAGNESIUM: CPT | Performed by: INTERNAL MEDICINE

## 2021-01-01 PROCEDURE — 93010 ELECTROCARDIOGRAM REPORT: CPT | Mod: 76 | Performed by: INTERNAL MEDICINE

## 2021-01-01 PROCEDURE — 84145 PROCALCITONIN (PCT): CPT | Performed by: PHYSICIAN ASSISTANT

## 2021-01-01 PROCEDURE — 87040 BLOOD CULTURE FOR BACTERIA: CPT | Performed by: PHYSICIAN ASSISTANT

## 2021-01-01 PROCEDURE — 82330 ASSAY OF CALCIUM: CPT | Performed by: PHYSICIAN ASSISTANT

## 2021-01-01 PROCEDURE — 70450 CT HEAD/BRAIN W/O DYE: CPT | Mod: 26 | Performed by: RADIOLOGY

## 2021-01-01 PROCEDURE — 87040 BLOOD CULTURE FOR BACTERIA: CPT | Performed by: INTERNAL MEDICINE

## 2021-01-01 PROCEDURE — 94002 VENT MGMT INPAT INIT DAY: CPT

## 2021-01-01 PROCEDURE — 87086 URINE CULTURE/COLONY COUNT: CPT | Performed by: PHYSICIAN ASSISTANT

## 2021-01-01 PROCEDURE — 84132 ASSAY OF SERUM POTASSIUM: CPT | Performed by: PHYSICIAN ASSISTANT

## 2021-01-01 PROCEDURE — 84100 ASSAY OF PHOSPHORUS: CPT | Performed by: STUDENT IN AN ORGANIZED HEALTH CARE EDUCATION/TRAINING PROGRAM

## 2021-01-01 PROCEDURE — 5A02210 ASSISTANCE WITH CARDIAC OUTPUT USING BALLOON PUMP, CONTINUOUS: ICD-10-PCS | Performed by: SURGERY

## 2021-01-01 PROCEDURE — 999N000075 HC STATISTIC IABP MONITORING

## 2021-01-01 PROCEDURE — 93306 TTE W/DOPPLER COMPLETE: CPT

## 2021-01-01 PROCEDURE — 36556 INSERT NON-TUNNEL CV CATH: CPT | Performed by: INTERNAL MEDICINE

## 2021-01-01 PROCEDURE — 80347 BENZODIAZEPINES 13 OR MORE: CPT | Performed by: PHYSICIAN ASSISTANT

## 2021-01-01 PROCEDURE — 272N000001 HC OR GENERAL SUPPLY STERILE: Performed by: INTERNAL MEDICINE

## 2021-01-01 PROCEDURE — 83605 ASSAY OF LACTIC ACID: CPT | Performed by: INTERNAL MEDICINE

## 2021-01-01 PROCEDURE — 250N000011 HC RX IP 250 OP 636: Performed by: PHYSICIAN ASSISTANT

## 2021-01-01 PROCEDURE — 85610 PROTHROMBIN TIME: CPT | Performed by: PHYSICIAN ASSISTANT

## 2021-01-01 PROCEDURE — 94003 VENT MGMT INPAT SUBQ DAY: CPT

## 2021-01-01 PROCEDURE — 999N000065 XR ABDOMEN PORT 1 VIEWS

## 2021-01-01 PROCEDURE — 82805 BLOOD GASES W/O2 SATURATION: CPT | Performed by: EMERGENCY MEDICINE

## 2021-01-01 PROCEDURE — 86316 IMMUNOASSAY TUMOR OTHER: CPT | Performed by: PHYSICIAN ASSISTANT

## 2021-01-01 PROCEDURE — 93321 DOPPLER ECHO F-UP/LMTD STD: CPT | Mod: 26 | Performed by: STUDENT IN AN ORGANIZED HEALTH CARE EDUCATION/TRAINING PROGRAM

## 2021-01-01 PROCEDURE — 250N000009 HC RX 250: Performed by: STUDENT IN AN ORGANIZED HEALTH CARE EDUCATION/TRAINING PROGRAM

## 2021-01-01 PROCEDURE — 82248 BILIRUBIN DIRECT: CPT | Performed by: INTERNAL MEDICINE

## 2021-01-01 PROCEDURE — 99232 SBSQ HOSP IP/OBS MODERATE 35: CPT | Performed by: NURSE PRACTITIONER

## 2021-01-01 PROCEDURE — 99152 MOD SED SAME PHYS/QHP 5/>YRS: CPT | Performed by: INTERNAL MEDICINE

## 2021-01-01 PROCEDURE — 258N000003 HC RX IP 258 OP 636: Performed by: PHYSICIAN ASSISTANT

## 2021-01-01 PROCEDURE — 85041 AUTOMATED RBC COUNT: CPT | Performed by: PHYSICIAN ASSISTANT

## 2021-01-01 PROCEDURE — 250N000009 HC RX 250: Performed by: NURSE ANESTHETIST, CERTIFIED REGISTERED

## 2021-01-01 PROCEDURE — 82810 BLOOD GASES O2 SAT ONLY: CPT

## 2021-01-01 PROCEDURE — 87205 SMEAR GRAM STAIN: CPT | Performed by: PHYSICIAN ASSISTANT

## 2021-01-01 PROCEDURE — 84460 ALANINE AMINO (ALT) (SGPT): CPT | Performed by: PHYSICIAN ASSISTANT

## 2021-01-01 PROCEDURE — 85027 COMPLETE CBC AUTOMATED: CPT | Performed by: PHYSICIAN ASSISTANT

## 2021-01-01 PROCEDURE — 250N000011 HC RX IP 250 OP 636: Performed by: EMERGENCY MEDICINE

## 2021-01-01 PROCEDURE — 86900 BLOOD TYPING SEROLOGIC ABO: CPT | Performed by: INTERNAL MEDICINE

## 2021-01-01 PROCEDURE — 85610 PROTHROMBIN TIME: CPT | Performed by: INTERNAL MEDICINE

## 2021-01-01 PROCEDURE — 95711 VEEG 2-12 HR UNMONITORED: CPT

## 2021-01-01 PROCEDURE — 272N000451 HC KIT SHRLOCK 5FR POWER PICC DOUBLE LUMEN

## 2021-01-01 PROCEDURE — 93010 ELECTROCARDIOGRAM REPORT: CPT | Mod: 59 | Performed by: INTERNAL MEDICINE

## 2021-01-01 PROCEDURE — 82040 ASSAY OF SERUM ALBUMIN: CPT | Performed by: PHYSICIAN ASSISTANT

## 2021-01-01 PROCEDURE — 93925 LOWER EXTREMITY STUDY: CPT

## 2021-01-01 PROCEDURE — 80076 HEPATIC FUNCTION PANEL: CPT | Performed by: INTERNAL MEDICINE

## 2021-01-01 PROCEDURE — 36592 COLLECT BLOOD FROM PICC: CPT | Performed by: STUDENT IN AN ORGANIZED HEALTH CARE EDUCATION/TRAINING PROGRAM

## 2021-01-01 PROCEDURE — 86140 C-REACTIVE PROTEIN: CPT | Performed by: PHYSICIAN ASSISTANT

## 2021-01-01 PROCEDURE — 99233 SBSQ HOSP IP/OBS HIGH 50: CPT | Mod: 25 | Performed by: INTERNAL MEDICINE

## 2021-01-01 PROCEDURE — 71045 X-RAY EXAM CHEST 1 VIEW: CPT | Mod: 77

## 2021-01-01 PROCEDURE — 99238 HOSP IP/OBS DSCHRG MGMT 30/<: CPT | Mod: GC | Performed by: STUDENT IN AN ORGANIZED HEALTH CARE EDUCATION/TRAINING PROGRAM

## 2021-01-01 PROCEDURE — 85610 PROTHROMBIN TIME: CPT | Performed by: EMERGENCY MEDICINE

## 2021-01-01 PROCEDURE — 81001 URINALYSIS AUTO W/SCOPE: CPT | Performed by: PHYSICIAN ASSISTANT

## 2021-01-01 PROCEDURE — 92950 HEART/LUNG RESUSCITATION CPR: CPT

## 2021-01-01 PROCEDURE — 95714 VEEG EA 12-26 HR UNMNTR: CPT

## 2021-01-01 PROCEDURE — 84295 ASSAY OF SERUM SODIUM: CPT | Performed by: INTERNAL MEDICINE

## 2021-01-01 PROCEDURE — 93306 TTE W/DOPPLER COMPLETE: CPT | Mod: 26 | Performed by: INTERNAL MEDICINE

## 2021-01-01 PROCEDURE — 95720 EEG PHY/QHP EA INCR W/VEEG: CPT | Performed by: PSYCHIATRY & NEUROLOGY

## 2021-01-01 PROCEDURE — 99291 CRITICAL CARE FIRST HOUR: CPT | Mod: 25 | Performed by: INTERNAL MEDICINE

## 2021-01-01 PROCEDURE — 33949 ECMO/ECLS DAILY MGMT ARTERY: CPT

## 2021-01-01 PROCEDURE — 96365 THER/PROPH/DIAG IV INF INIT: CPT

## 2021-01-01 PROCEDURE — 84484 ASSAY OF TROPONIN QUANT: CPT | Performed by: PHYSICIAN ASSISTANT

## 2021-01-01 PROCEDURE — 272N000007 HC KIT ART LINE INSERTION

## 2021-01-01 PROCEDURE — 93325 DOPPLER ECHO COLOR FLOW MAPG: CPT | Mod: 26 | Performed by: STUDENT IN AN ORGANIZED HEALTH CARE EDUCATION/TRAINING PROGRAM

## 2021-01-01 PROCEDURE — 82805 BLOOD GASES W/O2 SATURATION: CPT | Performed by: INTERNAL MEDICINE

## 2021-01-01 PROCEDURE — 71045 X-RAY EXAM CHEST 1 VIEW: CPT | Mod: 26 | Performed by: STUDENT IN AN ORGANIZED HEALTH CARE EDUCATION/TRAINING PROGRAM

## 2021-01-01 PROCEDURE — 258N000003 HC RX IP 258 OP 636: Performed by: NURSE ANESTHETIST, CERTIFIED REGISTERED

## 2021-01-01 PROCEDURE — G0463 HOSPITAL OUTPT CLINIC VISIT: HCPCS

## 2021-01-01 PROCEDURE — 33952 ECMO/ECLS INSJ PRPH CANNULA: CPT

## 2021-01-01 PROCEDURE — 83615 LACTATE (LD) (LDH) ENZYME: CPT | Performed by: PHYSICIAN ASSISTANT

## 2021-01-01 PROCEDURE — 99291 CRITICAL CARE FIRST HOUR: CPT | Mod: GC | Performed by: STUDENT IN AN ORGANIZED HEALTH CARE EDUCATION/TRAINING PROGRAM

## 2021-01-01 PROCEDURE — 84132 ASSAY OF SERUM POTASSIUM: CPT | Performed by: INTERNAL MEDICINE

## 2021-01-01 PROCEDURE — 36415 COLL VENOUS BLD VENIPUNCTURE: CPT | Performed by: EMERGENCY MEDICINE

## 2021-01-01 PROCEDURE — 99292 CRITICAL CARE ADDL 30 MIN: CPT | Mod: GC | Performed by: INTERNAL MEDICINE

## 2021-01-01 PROCEDURE — 250N000009 HC RX 250: Performed by: SURGERY

## 2021-01-01 PROCEDURE — 93925 LOWER EXTREMITY STUDY: CPT | Mod: 26 | Performed by: RADIOLOGY

## 2021-01-01 PROCEDURE — 90945 DIALYSIS ONE EVALUATION: CPT | Performed by: INTERNAL MEDICINE

## 2021-01-01 PROCEDURE — 76604 US EXAM CHEST: CPT

## 2021-01-01 PROCEDURE — 85730 THROMBOPLASTIN TIME PARTIAL: CPT | Performed by: EMERGENCY MEDICINE

## 2021-01-01 PROCEDURE — 250N000013 HC RX MED GY IP 250 OP 250 PS 637: Performed by: INTERNAL MEDICINE

## 2021-01-01 PROCEDURE — 87640 STAPH A DNA AMP PROBE: CPT | Performed by: PHYSICIAN ASSISTANT

## 2021-01-01 PROCEDURE — 71250 CT THORAX DX C-: CPT | Mod: 26 | Performed by: RADIOLOGY

## 2021-01-01 PROCEDURE — 272N000597

## 2021-01-01 PROCEDURE — 99222 1ST HOSP IP/OBS MODERATE 55: CPT | Performed by: NURSE PRACTITIONER

## 2021-01-01 PROCEDURE — C9113 INJ PANTOPRAZOLE SODIUM, VIA: HCPCS | Performed by: PHYSICIAN ASSISTANT

## 2021-01-01 PROCEDURE — 90947 DIALYSIS REPEATED EVAL: CPT

## 2021-01-01 PROCEDURE — 999N000007 HC SITE CHECK

## 2021-01-01 PROCEDURE — 99291 CRITICAL CARE FIRST HOUR: CPT | Mod: 25 | Performed by: STUDENT IN AN ORGANIZED HEALTH CARE EDUCATION/TRAINING PROGRAM

## 2021-01-01 PROCEDURE — 250N000011 HC RX IP 250 OP 636: Performed by: NURSE ANESTHETIST, CERTIFIED REGISTERED

## 2021-01-01 PROCEDURE — 04QK0ZZ REPAIR RIGHT FEMORAL ARTERY, OPEN APPROACH: ICD-10-PCS | Performed by: SURGERY

## 2021-01-01 PROCEDURE — 82803 BLOOD GASES ANY COMBINATION: CPT

## 2021-01-01 PROCEDURE — 70551 MRI BRAIN STEM W/O DYE: CPT

## 2021-01-01 PROCEDURE — 250N000011 HC RX IP 250 OP 636

## 2021-01-01 PROCEDURE — 71275 CT ANGIOGRAPHY CHEST: CPT | Mod: 26 | Performed by: RADIOLOGY

## 2021-01-01 PROCEDURE — 83605 ASSAY OF LACTIC ACID: CPT | Performed by: EMERGENCY MEDICINE

## 2021-01-01 PROCEDURE — 370N000017 HC ANESTHESIA TECHNICAL FEE, PER MIN: Performed by: SURGERY

## 2021-01-01 PROCEDURE — 85520 HEPARIN ASSAY: CPT | Performed by: INTERNAL MEDICINE

## 2021-01-01 PROCEDURE — 36415 COLL VENOUS BLD VENIPUNCTURE: CPT | Performed by: STUDENT IN AN ORGANIZED HEALTH CARE EDUCATION/TRAINING PROGRAM

## 2021-01-01 PROCEDURE — 250N000009 HC RX 250: Performed by: PHYSICIAN ASSISTANT

## 2021-01-01 PROCEDURE — 82374 ASSAY BLOOD CARBON DIOXIDE: CPT | Performed by: PHYSICIAN ASSISTANT

## 2021-01-01 PROCEDURE — 93454 CORONARY ARTERY ANGIO S&I: CPT | Performed by: INTERNAL MEDICINE

## 2021-01-01 PROCEDURE — 360N000079 HC SURGERY LEVEL 6, PER MIN: Performed by: SURGERY

## 2021-01-01 PROCEDURE — 5A1522G EXTRACORPOREAL OXYGENATION, MEMBRANE, PERIPHERAL VENO-ARTERIAL: ICD-10-PCS | Performed by: STUDENT IN AN ORGANIZED HEALTH CARE EDUCATION/TRAINING PROGRAM

## 2021-01-01 PROCEDURE — C1751 CATH, INF, PER/CENT/MIDLINE: HCPCS | Performed by: INTERNAL MEDICINE

## 2021-01-01 PROCEDURE — 99223 1ST HOSP IP/OBS HIGH 75: CPT | Mod: GC | Performed by: INTERNAL MEDICINE

## 2021-01-01 PROCEDURE — 87641 MR-STAPH DNA AMP PROBE: CPT | Performed by: PHYSICIAN ASSISTANT

## 2021-01-01 PROCEDURE — 36415 COLL VENOUS BLD VENIPUNCTURE: CPT | Performed by: PHYSICIAN ASSISTANT

## 2021-01-01 PROCEDURE — U0005 INFEC AGEN DETEC AMPLI PROBE: HCPCS | Performed by: INTERNAL MEDICINE

## 2021-01-01 PROCEDURE — 272N000201 ZZ HC ADHESIVE SKIN CLOSURE, DERMABOND

## 2021-01-01 PROCEDURE — 71250 CT THORAX DX C-: CPT

## 2021-01-01 PROCEDURE — 36620 INSERTION CATHETER ARTERY: CPT | Mod: 59

## 2021-01-01 PROCEDURE — B2111ZZ FLUOROSCOPY OF MULTIPLE CORONARY ARTERIES USING LOW OSMOLAR CONTRAST: ICD-10-PCS | Performed by: SURGERY

## 2021-01-01 PROCEDURE — 93880 EXTRACRANIAL BILAT STUDY: CPT

## 2021-01-01 PROCEDURE — 99232 SBSQ HOSP IP/OBS MODERATE 35: CPT | Mod: GC | Performed by: PSYCHIATRY & NEUROLOGY

## 2021-01-01 PROCEDURE — 272N000001 HC OR GENERAL SUPPLY STERILE: Performed by: SURGERY

## 2021-01-01 PROCEDURE — 85347 COAGULATION TIME ACTIVATED: CPT | Mod: 59

## 2021-01-01 PROCEDURE — 85730 THROMBOPLASTIN TIME PARTIAL: CPT | Performed by: INTERNAL MEDICINE

## 2021-01-01 PROCEDURE — 99153 MOD SED SAME PHYS/QHP EA: CPT | Performed by: INTERNAL MEDICINE

## 2021-01-01 PROCEDURE — 999N000180 XR SURGERY CARM FLUORO LESS THAN 5 MIN

## 2021-01-01 PROCEDURE — 99358 PROLONG SERVICE W/O CONTACT: CPT | Performed by: NURSE PRACTITIONER

## 2021-01-01 PROCEDURE — 80354 DRUG SCREENING FENTANYL: CPT | Performed by: PHYSICIAN ASSISTANT

## 2021-01-01 PROCEDURE — 83605 ASSAY OF LACTIC ACID: CPT | Performed by: STUDENT IN AN ORGANIZED HEALTH CARE EDUCATION/TRAINING PROGRAM

## 2021-01-01 PROCEDURE — 272N000555 HC SENSOR NIRS OXIMETER, ADULT

## 2021-01-01 PROCEDURE — 84484 ASSAY OF TROPONIN QUANT: CPT | Performed by: EMERGENCY MEDICINE

## 2021-01-01 PROCEDURE — 214N000001 HC R&B CCU UMMC

## 2021-01-01 PROCEDURE — 250N000009 HC RX 250

## 2021-01-01 PROCEDURE — 5A1955Z RESPIRATORY VENTILATION, GREATER THAN 96 CONSECUTIVE HOURS: ICD-10-PCS | Performed by: STUDENT IN AN ORGANIZED HEALTH CARE EDUCATION/TRAINING PROGRAM

## 2021-01-01 PROCEDURE — 93010 ELECTROCARDIOGRAM REPORT: CPT | Performed by: INTERNAL MEDICINE

## 2021-01-01 PROCEDURE — 95718 EEG PHYS/QHP 2-12 HR W/VEEG: CPT | Performed by: PSYCHIATRY & NEUROLOGY

## 2021-01-01 PROCEDURE — 87070 CULTURE OTHR SPECIMN AEROBIC: CPT | Performed by: PHYSICIAN ASSISTANT

## 2021-01-01 PROCEDURE — 84484 ASSAY OF TROPONIN QUANT: CPT | Performed by: STUDENT IN AN ORGANIZED HEALTH CARE EDUCATION/TRAINING PROGRAM

## 2021-01-01 PROCEDURE — 85027 COMPLETE CBC AUTOMATED: CPT | Performed by: EMERGENCY MEDICINE

## 2021-01-01 PROCEDURE — 258N000003 HC RX IP 258 OP 636: Performed by: STUDENT IN AN ORGANIZED HEALTH CARE EDUCATION/TRAINING PROGRAM

## 2021-01-01 PROCEDURE — 272N000002 HC OR SUPPLY OTHER OPNP: Performed by: SURGERY

## 2021-01-01 PROCEDURE — 84132 ASSAY OF SERUM POTASSIUM: CPT | Performed by: STUDENT IN AN ORGANIZED HEALTH CARE EDUCATION/TRAINING PROGRAM

## 2021-01-01 PROCEDURE — 999N000197 HC STATISTIC WOC PT EDUCATION, 0-15 MIN

## 2021-01-01 PROCEDURE — 82040 ASSAY OF SERUM ALBUMIN: CPT | Performed by: EMERGENCY MEDICINE

## 2021-01-01 PROCEDURE — 250N000024 HC ISOFLURANE, PER MIN: Performed by: SURGERY

## 2021-01-01 PROCEDURE — 93325 DOPPLER ECHO COLOR FLOW MAPG: CPT

## 2021-01-01 PROCEDURE — 36600 WITHDRAWAL OF ARTERIAL BLOOD: CPT

## 2021-01-01 PROCEDURE — 99292 CRITICAL CARE ADDL 30 MIN: CPT | Mod: 25 | Performed by: STUDENT IN AN ORGANIZED HEALTH CARE EDUCATION/TRAINING PROGRAM

## 2021-01-01 PROCEDURE — 70551 MRI BRAIN STEM W/O DYE: CPT | Mod: 26 | Performed by: RADIOLOGY

## 2021-01-01 PROCEDURE — 86900 BLOOD TYPING SEROLOGIC ABO: CPT | Performed by: EMERGENCY MEDICINE

## 2021-01-01 PROCEDURE — 99222 1ST HOSP IP/OBS MODERATE 55: CPT | Mod: GC | Performed by: PSYCHIATRY & NEUROLOGY

## 2021-01-01 PROCEDURE — 93880 EXTRACRANIAL BILAT STUDY: CPT | Mod: 26 | Performed by: RADIOLOGY

## 2021-01-01 PROCEDURE — 74176 CT ABD & PELVIS W/O CONTRAST: CPT | Mod: 26 | Performed by: RADIOLOGY

## 2021-01-01 PROCEDURE — 70450 CT HEAD/BRAIN W/O DYE: CPT | Mod: 26 | Performed by: STUDENT IN AN ORGANIZED HEALTH CARE EDUCATION/TRAINING PROGRAM

## 2021-01-01 PROCEDURE — 36556 INSERT NON-TUNNEL CV CATH: CPT | Mod: 51 | Performed by: SURGERY

## 2021-01-01 PROCEDURE — 258N000001 HC RX 258: Performed by: STUDENT IN AN ORGANIZED HEALTH CARE EDUCATION/TRAINING PROGRAM

## 2021-01-01 PROCEDURE — 33967 INSERT I-AORT PERCUT DEVICE: CPT | Performed by: INTERNAL MEDICINE

## 2021-01-01 PROCEDURE — 36569 INSJ PICC 5 YR+ W/O IMAGING: CPT

## 2021-01-01 PROCEDURE — 85025 COMPLETE CBC W/AUTO DIFF WBC: CPT | Performed by: PHYSICIAN ASSISTANT

## 2021-01-01 PROCEDURE — 5A1D90Z PERFORMANCE OF URINARY FILTRATION, CONTINUOUS, GREATER THAN 18 HOURS PER DAY: ICD-10-PCS | Performed by: SURGERY

## 2021-01-01 PROCEDURE — C1894 INTRO/SHEATH, NON-LASER: HCPCS | Performed by: INTERNAL MEDICINE

## 2021-01-01 PROCEDURE — 82247 BILIRUBIN TOTAL: CPT | Performed by: PHYSICIAN ASSISTANT

## 2021-01-01 RX ORDER — CALCIUM CHLORIDE, MAGNESIUM CHLORIDE, DEXTROSE MONOHYDRATE, LACTIC ACID, SODIUM CHLORIDE, SODIUM BICARBONATE AND POTASSIUM CHLORIDE 5.15; 2.03; 22; 5.4; 6.46; 3.09; .157 G/L; G/L; G/L; G/L; G/L; G/L; G/L
15 INJECTION INTRAVENOUS CONTINUOUS
Status: DISCONTINUED | OUTPATIENT
Start: 2021-01-01 | End: 2021-01-01

## 2021-01-01 RX ORDER — ACETAMINOPHEN 325 MG/1
650 TABLET ORAL EVERY 4 HOURS
Status: DISPENSED | OUTPATIENT
Start: 2021-01-01 | End: 2021-01-01

## 2021-01-01 RX ORDER — HEPARIN SODIUM 1000 [USP'U]/ML
10000 INJECTION, SOLUTION INTRAVENOUS; SUBCUTANEOUS ONCE
Status: COMPLETED | OUTPATIENT
Start: 2021-01-01 | End: 2021-01-01

## 2021-01-01 RX ORDER — HEPARIN SODIUM 200 [USP'U]/100ML
3 INJECTION, SOLUTION INTRAVENOUS CONTINUOUS
Status: DISCONTINUED | OUTPATIENT
Start: 2021-01-01 | End: 2021-01-01

## 2021-01-01 RX ORDER — CALCIUM CHLORIDE, MAGNESIUM CHLORIDE, DEXTROSE MONOHYDRATE, LACTIC ACID, SODIUM CHLORIDE, SODIUM BICARBONATE AND POTASSIUM CHLORIDE 5.15; 2.03; 22; 5.4; 6.46; 3.09; .157 G/L; G/L; G/L; G/L; G/L; G/L; G/L
12.5 INJECTION INTRAVENOUS CONTINUOUS
Status: DISCONTINUED | OUTPATIENT
Start: 2021-01-01 | End: 2021-01-01

## 2021-01-01 RX ORDER — MIDAZOLAM HCL IN 0.9 % NACL/PF 1 MG/ML
1-8 PLASTIC BAG, INJECTION (ML) INTRAVENOUS CONTINUOUS
Status: DISCONTINUED | OUTPATIENT
Start: 2021-01-01 | End: 2021-01-01 | Stop reason: HOSPADM

## 2021-01-01 RX ORDER — PIPERACILLIN SODIUM, TAZOBACTAM SODIUM 3; .375 G/15ML; G/15ML
3.38 INJECTION, POWDER, LYOPHILIZED, FOR SOLUTION INTRAVENOUS EVERY 6 HOURS
Status: DISCONTINUED | OUTPATIENT
Start: 2021-01-01 | End: 2021-01-01

## 2021-01-01 RX ORDER — POLYETHYLENE GLYCOL 3350 17 G/17G
17 POWDER, FOR SOLUTION ORAL DAILY
Status: DISCONTINUED | OUTPATIENT
Start: 2021-01-01 | End: 2021-01-01

## 2021-01-01 RX ORDER — ATROPINE SULFATE 10 MG/ML
1 SOLUTION/ DROPS OPHTHALMIC
Status: DISCONTINUED | OUTPATIENT
Start: 2021-01-01 | End: 2021-01-01 | Stop reason: HOSPADM

## 2021-01-01 RX ORDER — MAGNESIUM SULFATE HEPTAHYDRATE 40 MG/ML
2 INJECTION, SOLUTION INTRAVENOUS EVERY 8 HOURS PRN
Status: DISCONTINUED | OUTPATIENT
Start: 2021-01-01 | End: 2021-01-01

## 2021-01-01 RX ORDER — MIDAZOLAM HCL IN 0.9 % NACL/PF 1 MG/ML
1-8 PLASTIC BAG, INJECTION (ML) INTRAVENOUS CONTINUOUS
Status: DISCONTINUED | OUTPATIENT
Start: 2021-01-01 | End: 2021-01-01

## 2021-01-01 RX ORDER — DEXTROSE MONOHYDRATE 100 MG/ML
INJECTION, SOLUTION INTRAVENOUS CONTINUOUS
Status: DISCONTINUED | OUTPATIENT
Start: 2021-01-01 | End: 2021-01-01

## 2021-01-01 RX ORDER — LIDOCAINE 40 MG/G
CREAM TOPICAL
Status: DISCONTINUED | OUTPATIENT
Start: 2021-01-01 | End: 2021-01-01 | Stop reason: HOSPADM

## 2021-01-01 RX ORDER — HEPARIN SODIUM 1000 [USP'U]/ML
INJECTION, SOLUTION INTRAVENOUS; SUBCUTANEOUS PRN
Status: DISCONTINUED | OUTPATIENT
Start: 2021-01-01 | End: 2021-01-01

## 2021-01-01 RX ORDER — GLYCOPYRROLATE 0.2 MG/ML
0.1 INJECTION, SOLUTION INTRAMUSCULAR; INTRAVENOUS EVERY 4 HOURS PRN
Status: DISCONTINUED | OUTPATIENT
Start: 2021-01-01 | End: 2021-01-01 | Stop reason: HOSPADM

## 2021-01-01 RX ORDER — CALCIUM GLUCONATE 20 MG/ML
2 INJECTION, SOLUTION INTRAVENOUS EVERY 8 HOURS PRN
Status: DISCONTINUED | OUTPATIENT
Start: 2021-01-01 | End: 2021-01-01

## 2021-01-01 RX ORDER — NICOTINE POLACRILEX 4 MG
15-30 LOZENGE BUCCAL
Status: DISCONTINUED | OUTPATIENT
Start: 2021-01-01 | End: 2021-01-01 | Stop reason: HOSPADM

## 2021-01-01 RX ORDER — DEXTROSE MONOHYDRATE 25 G/50ML
25 INJECTION, SOLUTION INTRAVENOUS ONCE
Status: COMPLETED | OUTPATIENT
Start: 2021-01-01 | End: 2021-01-01

## 2021-01-01 RX ORDER — FENTANYL CITRATE 50 UG/ML
50 INJECTION, SOLUTION INTRAMUSCULAR; INTRAVENOUS EVERY 30 MIN PRN
Status: DISCONTINUED | OUTPATIENT
Start: 2021-01-01 | End: 2021-01-01 | Stop reason: HOSPADM

## 2021-01-01 RX ORDER — AMOXICILLIN 250 MG
1 CAPSULE ORAL 2 TIMES DAILY
Status: DISCONTINUED | OUTPATIENT
Start: 2021-01-01 | End: 2021-01-01

## 2021-01-01 RX ORDER — NALOXONE HYDROCHLORIDE 0.4 MG/ML
0.4 INJECTION, SOLUTION INTRAMUSCULAR; INTRAVENOUS; SUBCUTANEOUS
Status: DISCONTINUED | OUTPATIENT
Start: 2021-01-01 | End: 2021-01-01 | Stop reason: HOSPADM

## 2021-01-01 RX ORDER — ONDANSETRON 2 MG/ML
4 INJECTION INTRAMUSCULAR; INTRAVENOUS EVERY 6 HOURS PRN
Status: DISCONTINUED | OUTPATIENT
Start: 2021-01-01 | End: 2021-01-01 | Stop reason: HOSPADM

## 2021-01-01 RX ORDER — LIDOCAINE HYDROCHLORIDE ANHYDROUS AND DEXTROSE MONOHYDRATE .8; 5 G/100ML; G/100ML
1-4 INJECTION, SOLUTION INTRAVENOUS CONTINUOUS
Status: DISCONTINUED | OUTPATIENT
Start: 2021-01-01 | End: 2021-01-01 | Stop reason: HOSPADM

## 2021-01-01 RX ORDER — HYDRALAZINE HYDROCHLORIDE 20 MG/ML
10 INJECTION INTRAMUSCULAR; INTRAVENOUS EVERY 6 HOURS PRN
Status: DISCONTINUED | OUTPATIENT
Start: 2021-01-01 | End: 2021-01-01

## 2021-01-01 RX ORDER — MAGNESIUM SULFATE HEPTAHYDRATE 40 MG/ML
4 INJECTION, SOLUTION INTRAVENOUS EVERY 4 HOURS PRN
Status: DISCONTINUED | OUTPATIENT
Start: 2021-01-01 | End: 2021-01-01

## 2021-01-01 RX ORDER — AMINO AC/PROTEIN HYDR/WHEY PRO 10G-100/30
2 LIQUID (ML) ORAL 3 TIMES DAILY
Status: DISCONTINUED | OUTPATIENT
Start: 2021-01-01 | End: 2021-01-01

## 2021-01-01 RX ORDER — PIPERACILLIN SODIUM, TAZOBACTAM SODIUM 4; .5 G/20ML; G/20ML
4.5 INJECTION, POWDER, LYOPHILIZED, FOR SOLUTION INTRAVENOUS EVERY 6 HOURS
Status: COMPLETED | OUTPATIENT
Start: 2021-01-01 | End: 2021-01-01

## 2021-01-01 RX ORDER — FENTANYL CITRATE 50 UG/ML
INJECTION, SOLUTION INTRAMUSCULAR; INTRAVENOUS PRN
Status: DISCONTINUED | OUTPATIENT
Start: 2021-01-01 | End: 2021-01-01

## 2021-01-01 RX ORDER — ARGATROBAN 1 MG/ML
150 INJECTION, SOLUTION INTRAVENOUS
Status: DISCONTINUED | OUTPATIENT
Start: 2021-01-01 | End: 2021-01-01 | Stop reason: HOSPADM

## 2021-01-01 RX ORDER — EPINEPHRINE IN 0.9 % SOD CHLOR 5 MG/250ML
.03-.3 PLASTIC BAG, INJECTION (ML) INTRAVENOUS CONTINUOUS
Status: DISCONTINUED | OUTPATIENT
Start: 2021-01-01 | End: 2021-01-01 | Stop reason: HOSPADM

## 2021-01-01 RX ORDER — ALBUMIN, HUMAN INJ 5% 5 %
12.5 SOLUTION INTRAVENOUS EVERY 5 MIN PRN
Status: DISCONTINUED | OUTPATIENT
Start: 2021-01-01 | End: 2021-01-01 | Stop reason: HOSPADM

## 2021-01-01 RX ORDER — CALCIUM CHLORIDE, MAGNESIUM CHLORIDE, SODIUM CHLORIDE, SODIUM BICARBONATE, POTASSIUM CHLORIDE AND SODIUM PHOSPHATE DIBASIC DIHYDRATE 3.68; 3.05; 6.34; 3.09; .314; .187 G/L; G/L; G/L; G/L; G/L; G/L
12.5 INJECTION INTRAVENOUS CONTINUOUS
Status: DISCONTINUED | OUTPATIENT
Start: 2021-01-01 | End: 2021-01-01

## 2021-01-01 RX ORDER — PIPERACILLIN SODIUM, TAZOBACTAM SODIUM 4; .5 G/20ML; G/20ML
4.5 INJECTION, POWDER, LYOPHILIZED, FOR SOLUTION INTRAVENOUS EVERY 6 HOURS
Status: DISCONTINUED | OUTPATIENT
Start: 2021-01-01 | End: 2021-01-01

## 2021-01-01 RX ORDER — CALCIUM GLUCONATE 20 MG/ML
1 INJECTION, SOLUTION INTRAVENOUS ONCE
Status: COMPLETED | OUTPATIENT
Start: 2021-01-01 | End: 2021-01-01

## 2021-01-01 RX ORDER — BUSPIRONE HYDROCHLORIDE 15 MG/1
30 TABLET ORAL ONCE
Status: COMPLETED | OUTPATIENT
Start: 2021-01-01 | End: 2021-01-01

## 2021-01-01 RX ORDER — MAGNESIUM SULFATE HEPTAHYDRATE 40 MG/ML
2 INJECTION, SOLUTION INTRAVENOUS DAILY PRN
Status: DISCONTINUED | OUTPATIENT
Start: 2021-01-01 | End: 2021-01-01

## 2021-01-01 RX ORDER — MAGNESIUM SULFATE HEPTAHYDRATE 40 MG/ML
2 INJECTION, SOLUTION INTRAVENOUS EVERY 8 HOURS PRN
Status: DISCONTINUED | OUTPATIENT
Start: 2021-01-01 | End: 2021-01-01 | Stop reason: HOSPADM

## 2021-01-01 RX ORDER — CALCIUM CHLORIDE, MAGNESIUM CHLORIDE, SODIUM CHLORIDE, SODIUM BICARBONATE, POTASSIUM CHLORIDE AND SODIUM PHOSPHATE DIBASIC DIHYDRATE 3.68; 3.05; 6.34; 3.09; .314; .187 G/L; G/L; G/L; G/L; G/L; G/L
INJECTION INTRAVENOUS CONTINUOUS
Status: DISCONTINUED | OUTPATIENT
Start: 2021-01-01 | End: 2021-01-01

## 2021-01-01 RX ORDER — CARVEDILOL 6.25 MG/1
6.25 TABLET ORAL 2 TIMES DAILY
Status: DISCONTINUED | OUTPATIENT
Start: 2021-01-01 | End: 2021-01-01

## 2021-01-01 RX ORDER — NALOXONE HYDROCHLORIDE 0.4 MG/ML
0.2 INJECTION, SOLUTION INTRAMUSCULAR; INTRAVENOUS; SUBCUTANEOUS
Status: DISCONTINUED | OUTPATIENT
Start: 2021-01-01 | End: 2021-01-01

## 2021-01-01 RX ORDER — DEXTROSE MONOHYDRATE 100 MG/ML
INJECTION, SOLUTION INTRAVENOUS CONTINUOUS PRN
Status: DISCONTINUED | OUTPATIENT
Start: 2021-01-01 | End: 2021-01-01

## 2021-01-01 RX ORDER — B COMPLEX C NO.10/FOLIC ACID 900MCG/5ML
5 LIQUID (ML) ORAL DAILY
Status: DISCONTINUED | OUTPATIENT
Start: 2021-01-01 | End: 2021-01-01

## 2021-01-01 RX ORDER — FENTANYL CITRATE 50 UG/ML
INJECTION, SOLUTION INTRAMUSCULAR; INTRAVENOUS
Status: COMPLETED
Start: 2021-01-01 | End: 2021-01-01

## 2021-01-01 RX ORDER — HEPARIN SODIUM (PORCINE) LOCK FLUSH IV SOLN 100 UNIT/ML 100 UNIT/ML
3000 SOLUTION INTRAVENOUS ONCE
Status: COMPLETED | OUTPATIENT
Start: 2021-01-01 | End: 2021-01-01

## 2021-01-01 RX ORDER — DEXTROSE MONOHYDRATE 25 G/50ML
25-50 INJECTION, SOLUTION INTRAVENOUS
Status: DISCONTINUED | OUTPATIENT
Start: 2021-01-01 | End: 2021-01-01 | Stop reason: HOSPADM

## 2021-01-01 RX ORDER — NALOXONE HYDROCHLORIDE 0.4 MG/ML
0.4 INJECTION, SOLUTION INTRAMUSCULAR; INTRAVENOUS; SUBCUTANEOUS
Status: DISCONTINUED | OUTPATIENT
Start: 2021-01-01 | End: 2021-01-01

## 2021-01-01 RX ORDER — PROPOFOL 10 MG/ML
40 INJECTION, EMULSION INTRAVENOUS ONCE
Status: DISCONTINUED | OUTPATIENT
Start: 2021-01-01 | End: 2021-01-01

## 2021-01-01 RX ORDER — ARGATROBAN 1 MG/ML
350 INJECTION, SOLUTION INTRAVENOUS
Status: DISCONTINUED | OUTPATIENT
Start: 2021-01-01 | End: 2021-01-01 | Stop reason: HOSPADM

## 2021-01-01 RX ORDER — POTASSIUM CHLORIDE 29.8 MG/ML
20 INJECTION INTRAVENOUS EVERY 8 HOURS PRN
Status: DISCONTINUED | OUTPATIENT
Start: 2021-01-01 | End: 2021-01-01

## 2021-01-01 RX ORDER — NALOXONE HYDROCHLORIDE 0.4 MG/ML
0.2 INJECTION, SOLUTION INTRAMUSCULAR; INTRAVENOUS; SUBCUTANEOUS
Status: DISCONTINUED | OUTPATIENT
Start: 2021-01-01 | End: 2021-01-01 | Stop reason: HOSPADM

## 2021-01-01 RX ORDER — HEPARIN SODIUM (PORCINE) LOCK FLUSH IV SOLN 100 UNIT/ML 100 UNIT/ML
5-10 SOLUTION INTRAVENOUS EVERY 30 MIN PRN
Status: DISCONTINUED | OUTPATIENT
Start: 2021-01-01 | End: 2021-01-01

## 2021-01-01 RX ORDER — CALCIUM CHLORIDE, MAGNESIUM CHLORIDE, DEXTROSE MONOHYDRATE, LACTIC ACID, SODIUM CHLORIDE, SODIUM BICARBONATE AND POTASSIUM CHLORIDE 5.15; 2.03; 22; 5.4; 6.46; 3.09; .157 G/L; G/L; G/L; G/L; G/L; G/L; G/L
INJECTION INTRAVENOUS CONTINUOUS
Status: DISCONTINUED | OUTPATIENT
Start: 2021-01-01 | End: 2021-01-01

## 2021-01-01 RX ORDER — NOREPINEPHRINE BITARTRATE 0.06 MG/ML
.01-.6 INJECTION, SOLUTION INTRAVENOUS CONTINUOUS
Status: DISCONTINUED | OUTPATIENT
Start: 2021-01-01 | End: 2021-01-01

## 2021-01-01 RX ORDER — HEPARIN SODIUM 10000 [USP'U]/100ML
100-1000 INJECTION, SOLUTION INTRAVENOUS CONTINUOUS PRN
Status: DISCONTINUED | OUTPATIENT
Start: 2021-01-01 | End: 2021-01-01 | Stop reason: HOSPADM

## 2021-01-01 RX ORDER — FENTANYL CITRATE 50 UG/ML
200 INJECTION, SOLUTION INTRAMUSCULAR; INTRAVENOUS ONCE
Status: COMPLETED | OUTPATIENT
Start: 2021-01-01 | End: 2021-01-01

## 2021-01-01 RX ORDER — FENTANYL CITRATE 50 UG/ML
INJECTION, SOLUTION INTRAMUSCULAR; INTRAVENOUS
Status: DISCONTINUED
Start: 2021-01-01 | End: 2021-01-01 | Stop reason: HOSPADM

## 2021-01-01 RX ORDER — FENTANYL CITRATE 50 UG/ML
200 INJECTION, SOLUTION INTRAMUSCULAR; INTRAVENOUS ONCE
Status: DISCONTINUED | OUTPATIENT
Start: 2021-01-01 | End: 2021-01-01 | Stop reason: HOSPADM

## 2021-01-01 RX ORDER — POTASSIUM CHLORIDE 29.8 MG/ML
20 INJECTION INTRAVENOUS
Status: COMPLETED | OUTPATIENT
Start: 2021-01-01 | End: 2021-01-01

## 2021-01-01 RX ORDER — HEPARIN SODIUM 5000 [USP'U]/.5ML
5000 INJECTION, SOLUTION INTRAVENOUS; SUBCUTANEOUS EVERY 12 HOURS
Status: DISCONTINUED | OUTPATIENT
Start: 2021-01-01 | End: 2021-01-01

## 2021-01-01 RX ORDER — HEPARIN SODIUM 10000 [USP'U]/100ML
0-5000 INJECTION, SOLUTION INTRAVENOUS CONTINUOUS
Status: DISCONTINUED | OUTPATIENT
Start: 2021-01-01 | End: 2021-01-01

## 2021-01-01 RX ORDER — LORAZEPAM 2 MG/ML
0.5 INJECTION INTRAMUSCULAR ONCE
Status: COMPLETED | OUTPATIENT
Start: 2021-01-01 | End: 2021-01-01

## 2021-01-01 RX ORDER — MIDAZOLAM HCL IN 0.9 % NACL/PF 1 MG/ML
PLASTIC BAG, INJECTION (ML) INTRAVENOUS CONTINUOUS PRN
Status: COMPLETED | OUTPATIENT
Start: 2021-01-01 | End: 2021-01-01

## 2021-01-01 RX ORDER — CARBOXYMETHYLCELLULOSE SODIUM 5 MG/ML
1 SOLUTION/ DROPS OPHTHALMIC EVERY 8 HOURS PRN
Status: DISCONTINUED | OUTPATIENT
Start: 2021-01-01 | End: 2021-01-01 | Stop reason: HOSPADM

## 2021-01-01 RX ORDER — HEPARIN SODIUM (PORCINE) LOCK FLUSH IV SOLN 100 UNIT/ML 100 UNIT/ML
5-10 SOLUTION INTRAVENOUS EVERY 30 MIN PRN
Status: DISCONTINUED | OUTPATIENT
Start: 2021-01-01 | End: 2021-01-01 | Stop reason: HOSPADM

## 2021-01-01 RX ORDER — IOPAMIDOL 755 MG/ML
73 INJECTION, SOLUTION INTRAVASCULAR ONCE
Status: COMPLETED | OUTPATIENT
Start: 2021-01-01 | End: 2021-01-01

## 2021-01-01 RX ORDER — FLUORIDE TOOTHPASTE
5-10 TOOTHPASTE DENTAL
Status: DISCONTINUED | OUTPATIENT
Start: 2021-01-01 | End: 2021-01-01 | Stop reason: HOSPADM

## 2021-01-01 RX ORDER — MAGNESIUM SULFATE HEPTAHYDRATE 500 MG/ML
2 INJECTION, SOLUTION INTRAMUSCULAR; INTRAVENOUS
Status: DISCONTINUED | OUTPATIENT
Start: 2021-01-01 | End: 2021-01-01 | Stop reason: HOSPADM

## 2021-01-01 RX ORDER — ASPIRIN 81 MG/1
81 TABLET, CHEWABLE ORAL DAILY
Status: DISCONTINUED | OUTPATIENT
Start: 2021-01-01 | End: 2021-01-01

## 2021-01-01 RX ORDER — PROPOFOL 10 MG/ML
INJECTION, EMULSION INTRAVENOUS
Status: COMPLETED
Start: 2021-01-01 | End: 2021-01-01

## 2021-01-01 RX ORDER — NOREPINEPHRINE BITARTRATE 0.02 MG/ML
.03-.4 INJECTION, SOLUTION INTRAVENOUS CONTINUOUS PRN
Status: DISCONTINUED | OUTPATIENT
Start: 2021-01-01 | End: 2021-01-01 | Stop reason: HOSPADM

## 2021-01-01 RX ORDER — SODIUM CHLORIDE, SODIUM GLUCONATE, SODIUM ACETATE, POTASSIUM CHLORIDE AND MAGNESIUM CHLORIDE 526; 502; 368; 37; 30 MG/100ML; MG/100ML; MG/100ML; MG/100ML; MG/100ML
INJECTION, SOLUTION INTRAVENOUS CONTINUOUS PRN
Status: DISCONTINUED | OUTPATIENT
Start: 2021-01-01 | End: 2021-01-01

## 2021-01-01 RX ORDER — ACETAMINOPHEN 325 MG/1
650 TABLET ORAL EVERY 6 HOURS PRN
Status: DISCONTINUED | OUTPATIENT
Start: 2021-01-01 | End: 2021-01-01 | Stop reason: HOSPADM

## 2021-01-01 RX ORDER — PHENYLEPHRINE HCL IN 0.9% NACL 50MG/250ML
.5-6 PLASTIC BAG, INJECTION (ML) INTRAVENOUS CONTINUOUS
Status: DISCONTINUED | OUTPATIENT
Start: 2021-01-01 | End: 2021-01-01

## 2021-01-01 RX ORDER — AMIODARONE HYDROCHLORIDE 200 MG/1
400 TABLET ORAL 2 TIMES DAILY
Status: DISCONTINUED | OUTPATIENT
Start: 2021-01-01 | End: 2021-01-01

## 2021-01-01 RX ORDER — PROTAMINE SULFATE 10 MG/ML
INJECTION, SOLUTION INTRAVENOUS PRN
Status: DISCONTINUED | OUTPATIENT
Start: 2021-01-01 | End: 2021-01-01

## 2021-01-01 RX ORDER — 3% SODIUM CHLORIDE 3 G/100ML
INJECTION, SOLUTION INTRAVENOUS CONTINUOUS
Status: DISCONTINUED | OUTPATIENT
Start: 2021-01-01 | End: 2021-01-01

## 2021-01-01 RX ORDER — ACETAMINOPHEN 325 MG/1
650 TABLET ORAL EVERY 6 HOURS
Status: DISCONTINUED | OUTPATIENT
Start: 2021-01-01 | End: 2021-01-01

## 2021-01-01 RX ORDER — CHLORHEXIDINE GLUCONATE ORAL RINSE 1.2 MG/ML
15 SOLUTION DENTAL 2 TIMES DAILY
Status: DISCONTINUED | OUTPATIENT
Start: 2021-01-01 | End: 2021-01-01

## 2021-01-01 RX ORDER — HEPARIN SODIUM 10000 [USP'U]/100ML
10-50 INJECTION, SOLUTION INTRAVENOUS CONTINUOUS
Status: DISCONTINUED | OUTPATIENT
Start: 2021-01-01 | End: 2021-01-01

## 2021-01-01 RX ORDER — MAGNESIUM SULFATE HEPTAHYDRATE 40 MG/ML
2 INJECTION, SOLUTION INTRAVENOUS ONCE
Status: COMPLETED | OUTPATIENT
Start: 2021-01-01 | End: 2021-01-01

## 2021-01-01 RX ORDER — FENTANYL CITRATE 50 UG/ML
25 INJECTION, SOLUTION INTRAMUSCULAR; INTRAVENOUS
Status: DISCONTINUED | OUTPATIENT
Start: 2021-01-01 | End: 2021-01-01 | Stop reason: HOSPADM

## 2021-01-01 RX ORDER — ONDANSETRON 4 MG/1
4 TABLET, ORALLY DISINTEGRATING ORAL EVERY 6 HOURS PRN
Status: DISCONTINUED | OUTPATIENT
Start: 2021-01-01 | End: 2021-01-01 | Stop reason: HOSPADM

## 2021-01-01 RX ORDER — GLYCOPYRROLATE 0.2 MG/ML
0.2 INJECTION, SOLUTION INTRAMUSCULAR; INTRAVENOUS ONCE
Status: COMPLETED | OUTPATIENT
Start: 2021-01-01 | End: 2021-01-01

## 2021-01-01 RX ORDER — HEPARIN SODIUM (PORCINE) LOCK FLUSH IV SOLN 100 UNIT/ML 100 UNIT/ML
10000 SOLUTION INTRAVENOUS ONCE
Status: COMPLETED | OUTPATIENT
Start: 2021-01-01 | End: 2021-01-01

## 2021-01-01 RX ADMIN — CALCIUM CHLORIDE, MAGNESIUM CHLORIDE, DEXTROSE MONOHYDRATE, LACTIC ACID, SODIUM CHLORIDE, SODIUM BICARBONATE AND POTASSIUM CHLORIDE 12.5 ML/KG/HR: 5.15; 2.03; 22; 5.4; 6.46; 3.09; .157 INJECTION INTRAVENOUS at 02:50

## 2021-01-01 RX ADMIN — PIPERACILLIN SODIUM AND TAZOBACTAM SODIUM 4.5 G: 4; .5 INJECTION, POWDER, LYOPHILIZED, FOR SOLUTION INTRAVENOUS at 01:49

## 2021-01-01 RX ADMIN — MIDAZOLAM 2 MG: 1 INJECTION INTRAMUSCULAR; INTRAVENOUS at 19:47

## 2021-01-01 RX ADMIN — SODIUM CHLORIDE, SODIUM GLUCONATE, SODIUM ACETATE, POTASSIUM CHLORIDE AND MAGNESIUM CHLORIDE: 526; 502; 368; 37; 30 INJECTION, SOLUTION INTRAVENOUS at 08:27

## 2021-01-01 RX ADMIN — CALCIUM CHLORIDE, MAGNESIUM CHLORIDE, DEXTROSE MONOHYDRATE, LACTIC ACID, SODIUM CHLORIDE, SODIUM BICARBONATE AND POTASSIUM CHLORIDE 12.5 ML/KG/HR: 5.15; 2.03; 22; 5.4; 6.46; 3.09; .157 INJECTION INTRAVENOUS at 23:11

## 2021-01-01 RX ADMIN — CALCIUM CHLORIDE, MAGNESIUM CHLORIDE, SODIUM CHLORIDE, SODIUM BICARBONATE, POTASSIUM CHLORIDE AND SODIUM PHOSPHATE DIBASIC DIHYDRATE 12.5 ML/KG/HR: 3.68; 3.05; 6.34; 3.09; .314; .187 INJECTION INTRAVENOUS at 20:28

## 2021-01-01 RX ADMIN — PIPERACILLIN SODIUM AND TAZOBACTAM SODIUM 4.5 G: 4; .5 INJECTION, POWDER, LYOPHILIZED, FOR SOLUTION INTRAVENOUS at 02:09

## 2021-01-01 RX ADMIN — CALCIUM CHLORIDE, MAGNESIUM CHLORIDE, SODIUM CHLORIDE, SODIUM BICARBONATE, POTASSIUM CHLORIDE AND SODIUM PHOSPHATE DIBASIC DIHYDRATE 12.5 ML/KG/HR: 3.68; 3.05; 6.34; 3.09; .314; .187 INJECTION INTRAVENOUS at 11:30

## 2021-01-01 RX ADMIN — CALCIUM CHLORIDE, MAGNESIUM CHLORIDE, DEXTROSE MONOHYDRATE, LACTIC ACID, SODIUM CHLORIDE, SODIUM BICARBONATE AND POTASSIUM CHLORIDE 12.5 ML/KG/HR: 5.15; 2.03; 22; 5.4; 6.46; 3.09; .157 INJECTION INTRAVENOUS at 16:41

## 2021-01-01 RX ADMIN — ROCURONIUM BROMIDE 100 MG: 50 INJECTION, SOLUTION INTRAVENOUS at 08:30

## 2021-01-01 RX ADMIN — Medication 50 MCG: at 11:19

## 2021-01-01 RX ADMIN — CALCIUM CHLORIDE, MAGNESIUM CHLORIDE, DEXTROSE MONOHYDRATE, LACTIC ACID, SODIUM CHLORIDE, SODIUM BICARBONATE AND POTASSIUM CHLORIDE 15 ML/KG/HR: 5.15; 2.03; 22; 5.4; 6.46; 3.09; .157 INJECTION INTRAVENOUS at 03:25

## 2021-01-01 RX ADMIN — ACETAMINOPHEN 650 MG: 325 TABLET, FILM COATED ORAL at 08:01

## 2021-01-01 RX ADMIN — CALCIUM CHLORIDE, MAGNESIUM CHLORIDE, SODIUM CHLORIDE, SODIUM BICARBONATE, POTASSIUM CHLORIDE AND SODIUM PHOSPHATE DIBASIC DIHYDRATE 12.5 ML/KG/HR: 3.68; 3.05; 6.34; 3.09; .314; .187 INJECTION INTRAVENOUS at 00:07

## 2021-01-01 RX ADMIN — CALCIUM CHLORIDE, MAGNESIUM CHLORIDE, SODIUM CHLORIDE, SODIUM BICARBONATE, POTASSIUM CHLORIDE AND SODIUM PHOSPHATE DIBASIC DIHYDRATE 12.5 ML/KG/HR: 3.68; 3.05; 6.34; 3.09; .314; .187 INJECTION INTRAVENOUS at 04:58

## 2021-01-01 RX ADMIN — CALCIUM CHLORIDE, MAGNESIUM CHLORIDE, SODIUM CHLORIDE, SODIUM BICARBONATE, POTASSIUM CHLORIDE AND SODIUM PHOSPHATE DIBASIC DIHYDRATE 12.5 ML/KG/HR: 3.68; 3.05; 6.34; 3.09; .314; .187 INJECTION INTRAVENOUS at 19:22

## 2021-01-01 RX ADMIN — MAGNESIUM SULFATE HEPTAHYDRATE 2 G: 40 INJECTION, SOLUTION INTRAVENOUS at 11:59

## 2021-01-01 RX ADMIN — ACETAMINOPHEN 650 MG: 325 TABLET, FILM COATED ORAL at 08:04

## 2021-01-01 RX ADMIN — PIPERACILLIN SODIUM AND TAZOBACTAM SODIUM 4.5 G: 4; .5 INJECTION, POWDER, LYOPHILIZED, FOR SOLUTION INTRAVENOUS at 07:52

## 2021-01-01 RX ADMIN — NOREPINEPHRINE BITARTRATE 0.03 MCG/KG/MIN: 1 INJECTION, SOLUTION, CONCENTRATE INTRAVENOUS at 09:19

## 2021-01-01 RX ADMIN — HEPARIN SODIUM 5000 UNITS: 10000 INJECTION, SOLUTION INTRAVENOUS; SUBCUTANEOUS at 17:21

## 2021-01-01 RX ADMIN — ATROPINE SULFATE 1 DROP: 10 SOLUTION/ DROPS OPHTHALMIC at 19:37

## 2021-01-01 RX ADMIN — Medication 2 PACKET: at 08:03

## 2021-01-01 RX ADMIN — CHLORHEXIDINE GLUCONATE 15 ML: 1.2 RINSE ORAL at 20:11

## 2021-01-01 RX ADMIN — Medication 2 PACKET: at 14:16

## 2021-01-01 RX ADMIN — Medication 0.1 MCG/KG/MIN: at 10:00

## 2021-01-01 RX ADMIN — ATROPINE SULFATE 1 DROP: 10 SOLUTION/ DROPS OPHTHALMIC at 21:55

## 2021-01-01 RX ADMIN — CARVEDILOL 6.25 MG: 6.25 TABLET, FILM COATED ORAL at 20:15

## 2021-01-01 RX ADMIN — CALCIUM CHLORIDE, MAGNESIUM CHLORIDE, SODIUM CHLORIDE, SODIUM BICARBONATE, POTASSIUM CHLORIDE AND SODIUM PHOSPHATE DIBASIC DIHYDRATE: 3.68; 3.05; 6.34; 3.09; .314; .187 INJECTION INTRAVENOUS at 19:23

## 2021-01-01 RX ADMIN — CALCIUM CHLORIDE, MAGNESIUM CHLORIDE, DEXTROSE MONOHYDRATE, LACTIC ACID, SODIUM CHLORIDE, SODIUM BICARBONATE AND POTASSIUM CHLORIDE 15 ML/KG/HR: 5.15; 2.03; 22; 5.4; 6.46; 3.09; .157 INJECTION INTRAVENOUS at 23:57

## 2021-01-01 RX ADMIN — CALCIUM CHLORIDE, MAGNESIUM CHLORIDE, SODIUM CHLORIDE, SODIUM BICARBONATE, POTASSIUM CHLORIDE AND SODIUM PHOSPHATE DIBASIC DIHYDRATE 12.5 ML/KG/HR: 3.68; 3.05; 6.34; 3.09; .314; .187 INJECTION INTRAVENOUS at 14:14

## 2021-01-01 RX ADMIN — ATROPINE SULFATE 1 DROP: 10 SOLUTION/ DROPS OPHTHALMIC at 16:23

## 2021-01-01 RX ADMIN — CALCIUM CHLORIDE, MAGNESIUM CHLORIDE, SODIUM CHLORIDE, SODIUM BICARBONATE, POTASSIUM CHLORIDE AND SODIUM PHOSPHATE DIBASIC DIHYDRATE 12.5 ML/KG/HR: 3.68; 3.05; 6.34; 3.09; .314; .187 INJECTION INTRAVENOUS at 03:14

## 2021-01-01 RX ADMIN — ACETAMINOPHEN 650 MG: 325 TABLET, FILM COATED ORAL at 02:15

## 2021-01-01 RX ADMIN — CALCIUM CHLORIDE, MAGNESIUM CHLORIDE, SODIUM CHLORIDE, SODIUM BICARBONATE, POTASSIUM CHLORIDE AND SODIUM PHOSPHATE DIBASIC DIHYDRATE 12.5 ML/KG/HR: 3.68; 3.05; 6.34; 3.09; .314; .187 INJECTION INTRAVENOUS at 10:44

## 2021-01-01 RX ADMIN — CALCIUM GLUCONATE 2 G: 20 INJECTION, SOLUTION INTRAVENOUS at 05:09

## 2021-01-01 RX ADMIN — CALCIUM CHLORIDE, MAGNESIUM CHLORIDE, SODIUM CHLORIDE, SODIUM BICARBONATE, POTASSIUM CHLORIDE AND SODIUM PHOSPHATE DIBASIC DIHYDRATE 12.5 ML/KG/HR: 3.68; 3.05; 6.34; 3.09; .314; .187 INJECTION INTRAVENOUS at 06:50

## 2021-01-01 RX ADMIN — PIPERACILLIN SODIUM AND TAZOBACTAM SODIUM 4.5 G: 4; .5 INJECTION, POWDER, LYOPHILIZED, FOR SOLUTION INTRAVENOUS at 20:10

## 2021-01-01 RX ADMIN — FENTANYL CITRATE 100 MCG: 50 INJECTION, SOLUTION INTRAMUSCULAR; INTRAVENOUS at 10:59

## 2021-01-01 RX ADMIN — CALCIUM CHLORIDE, MAGNESIUM CHLORIDE, SODIUM CHLORIDE, SODIUM BICARBONATE, POTASSIUM CHLORIDE AND SODIUM PHOSPHATE DIBASIC DIHYDRATE 12.5 ML/KG/HR: 3.68; 3.05; 6.34; 3.09; .314; .187 INJECTION INTRAVENOUS at 00:53

## 2021-01-01 RX ADMIN — PROTAMINE SULFATE 10 MG: 10 INJECTION, SOLUTION INTRAVENOUS at 10:02

## 2021-01-01 RX ADMIN — CALCIUM CHLORIDE 1 G: 100 INJECTION, SOLUTION INTRAVENOUS at 05:07

## 2021-01-01 RX ADMIN — PANTOPRAZOLE SODIUM 40 MG: 40 INJECTION, POWDER, FOR SOLUTION INTRAVENOUS at 07:53

## 2021-01-01 RX ADMIN — GLYCOPYRROLATE 0.1 MG: 0.2 INJECTION INTRAMUSCULAR; INTRAVENOUS at 08:07

## 2021-01-01 RX ADMIN — CALCIUM CHLORIDE, MAGNESIUM CHLORIDE, SODIUM CHLORIDE, SODIUM BICARBONATE, POTASSIUM CHLORIDE AND SODIUM PHOSPHATE DIBASIC DIHYDRATE 12.5 ML/KG/HR: 3.68; 3.05; 6.34; 3.09; .314; .187 INJECTION INTRAVENOUS at 03:44

## 2021-01-01 RX ADMIN — HUMAN INSULIN 10 UNITS: 100 INJECTION, SOLUTION SUBCUTANEOUS at 17:07

## 2021-01-01 RX ADMIN — Medication 2 PACKET: at 19:33

## 2021-01-01 RX ADMIN — HEPARIN SODIUM 3 ML/HR: 200 INJECTION, SOLUTION INTRAVENOUS at 14:53

## 2021-01-01 RX ADMIN — ATROPINE SULFATE 1 DROP: 10 SOLUTION/ DROPS OPHTHALMIC at 17:32

## 2021-01-01 RX ADMIN — AMIODARONE HYDROCHLORIDE 1 MG/MIN: 50 INJECTION, SOLUTION INTRAVENOUS at 09:38

## 2021-01-01 RX ADMIN — AMIODARONE HYDROCHLORIDE 400 MG: 200 TABLET ORAL at 20:15

## 2021-01-01 RX ADMIN — Medication 50 MCG/HR: at 15:53

## 2021-01-01 RX ADMIN — CALCIUM CHLORIDE, MAGNESIUM CHLORIDE, SODIUM CHLORIDE, SODIUM BICARBONATE, POTASSIUM CHLORIDE AND SODIUM PHOSPHATE DIBASIC DIHYDRATE 12.5 ML/KG/HR: 3.68; 3.05; 6.34; 3.09; .314; .187 INJECTION INTRAVENOUS at 14:12

## 2021-01-01 RX ADMIN — ACETAMINOPHEN 650 MG: 325 TABLET, FILM COATED ORAL at 02:09

## 2021-01-01 RX ADMIN — CALCIUM CHLORIDE, MAGNESIUM CHLORIDE, SODIUM CHLORIDE, SODIUM BICARBONATE, POTASSIUM CHLORIDE AND SODIUM PHOSPHATE DIBASIC DIHYDRATE 12.5 ML/KG/HR: 3.68; 3.05; 6.34; 3.09; .314; .187 INJECTION INTRAVENOUS at 03:39

## 2021-01-01 RX ADMIN — CHLORHEXIDINE GLUCONATE 15 ML: 1.2 RINSE ORAL at 20:15

## 2021-01-01 RX ADMIN — VANCOMYCIN HYDROCHLORIDE 2500 MG: 1 INJECTION, POWDER, LYOPHILIZED, FOR SOLUTION INTRAVENOUS at 17:06

## 2021-01-01 RX ADMIN — ROCURONIUM BROMIDE 50 MG: 50 INJECTION, SOLUTION INTRAVENOUS at 09:34

## 2021-01-01 RX ADMIN — ACETAMINOPHEN 650 MG: 325 TABLET, FILM COATED ORAL at 14:37

## 2021-01-01 RX ADMIN — IOPAMIDOL 73 ML: 755 INJECTION, SOLUTION INTRAVENOUS at 13:31

## 2021-01-01 RX ADMIN — CALCIUM GLUCONATE 2 G: 20 INJECTION, SOLUTION INTRAVENOUS at 11:09

## 2021-01-01 RX ADMIN — HYDRALAZINE HYDROCHLORIDE 10 MG: 20 INJECTION INTRAMUSCULAR; INTRAVENOUS at 08:05

## 2021-01-01 RX ADMIN — FENTANYL CITRATE 25 MCG: 50 INJECTION, SOLUTION INTRAMUSCULAR; INTRAVENOUS at 22:48

## 2021-01-01 RX ADMIN — AMIODARONE HYDROCHLORIDE 400 MG: 200 TABLET ORAL at 19:27

## 2021-01-01 RX ADMIN — CALCIUM CHLORIDE, MAGNESIUM CHLORIDE, DEXTROSE MONOHYDRATE, LACTIC ACID, SODIUM CHLORIDE, SODIUM BICARBONATE AND POTASSIUM CHLORIDE 15 ML/KG/HR: 5.15; 2.03; 22; 5.4; 6.46; 3.09; .157 INJECTION INTRAVENOUS at 18:01

## 2021-01-01 RX ADMIN — MIDAZOLAM 2 MG: 1 INJECTION INTRAMUSCULAR; INTRAVENOUS at 11:24

## 2021-01-01 RX ADMIN — FENTANYL CITRATE 100 MCG: 50 INJECTION, SOLUTION INTRAMUSCULAR; INTRAVENOUS at 09:14

## 2021-01-01 RX ADMIN — CALCIUM CHLORIDE, MAGNESIUM CHLORIDE, SODIUM CHLORIDE, SODIUM BICARBONATE, POTASSIUM CHLORIDE AND SODIUM PHOSPHATE DIBASIC DIHYDRATE 12.5 ML/KG/HR: 3.68; 3.05; 6.34; 3.09; .314; .187 INJECTION INTRAVENOUS at 15:55

## 2021-01-01 RX ADMIN — ASPIRIN 81 MG CHEWABLE TABLET 81 MG: 81 TABLET CHEWABLE at 08:04

## 2021-01-01 RX ADMIN — Medication 40 MG: at 07:35

## 2021-01-01 RX ADMIN — CHLORHEXIDINE GLUCONATE 15 ML: 1.2 RINSE ORAL at 12:08

## 2021-01-01 RX ADMIN — VANCOMYCIN HYDROCHLORIDE 2250 MG: 10 INJECTION, POWDER, LYOPHILIZED, FOR SOLUTION INTRAVENOUS at 16:35

## 2021-01-01 RX ADMIN — ACETAMINOPHEN 650 MG: 325 TABLET, FILM COATED ORAL at 19:33

## 2021-01-01 RX ADMIN — HUMAN INSULIN 1 UNITS/HR: 100 INJECTION, SOLUTION SUBCUTANEOUS at 12:39

## 2021-01-01 RX ADMIN — AMIODARONE HYDROCHLORIDE 400 MG: 200 TABLET ORAL at 08:04

## 2021-01-01 RX ADMIN — MIDAZOLAM 2 MG: 1 INJECTION INTRAMUSCULAR; INTRAVENOUS at 16:32

## 2021-01-01 RX ADMIN — FENTANYL CITRATE 25 MCG: 50 INJECTION, SOLUTION INTRAMUSCULAR; INTRAVENOUS at 05:27

## 2021-01-01 RX ADMIN — FENTANYL CITRATE 25 MCG: 50 INJECTION, SOLUTION INTRAMUSCULAR; INTRAVENOUS at 19:37

## 2021-01-01 RX ADMIN — HEPARIN SODIUM 5000 UNITS: 1000 INJECTION INTRAVENOUS; SUBCUTANEOUS at 09:20

## 2021-01-01 RX ADMIN — GLYCOPYRROLATE 0.2 MG: 0.2 INJECTION INTRAMUSCULAR; INTRAVENOUS at 14:25

## 2021-01-01 RX ADMIN — CALCIUM CHLORIDE, MAGNESIUM CHLORIDE, SODIUM CHLORIDE, SODIUM BICARBONATE, POTASSIUM CHLORIDE AND SODIUM PHOSPHATE DIBASIC DIHYDRATE 12.5 ML/KG/HR: 3.68; 3.05; 6.34; 3.09; .314; .187 INJECTION INTRAVENOUS at 00:54

## 2021-01-01 RX ADMIN — CALCIUM CHLORIDE, MAGNESIUM CHLORIDE, DEXTROSE MONOHYDRATE, LACTIC ACID, SODIUM CHLORIDE, SODIUM BICARBONATE AND POTASSIUM CHLORIDE 12.5 ML/KG/HR: 5.15; 2.03; 22; 5.4; 6.46; 3.09; .157 INJECTION INTRAVENOUS at 14:48

## 2021-01-01 RX ADMIN — PROPOFOL 40 MG: 10 INJECTION, EMULSION INTRAVENOUS at 11:40

## 2021-01-01 RX ADMIN — CALCIUM CHLORIDE, MAGNESIUM CHLORIDE, SODIUM CHLORIDE, SODIUM BICARBONATE, POTASSIUM CHLORIDE AND SODIUM PHOSPHATE DIBASIC DIHYDRATE 12.5 ML/KG/HR: 3.68; 3.05; 6.34; 3.09; .314; .187 INJECTION INTRAVENOUS at 04:57

## 2021-01-01 RX ADMIN — Medication 2 PACKET: at 07:53

## 2021-01-01 RX ADMIN — Medication 100 MCG/HR: at 22:56

## 2021-01-01 RX ADMIN — AMIODARONE HYDROCHLORIDE 400 MG: 200 TABLET ORAL at 20:11

## 2021-01-01 RX ADMIN — MIDAZOLAM 2 MG: 1 INJECTION INTRAMUSCULAR; INTRAVENOUS at 15:31

## 2021-01-01 RX ADMIN — DEXTROSE MONOHYDRATE 25 G: 500 INJECTION PARENTERAL at 17:09

## 2021-01-01 RX ADMIN — DOCUSATE SODIUM 50 MG AND SENNOSIDES 8.6 MG 1 TABLET: 8.6; 5 TABLET, FILM COATED ORAL at 19:33

## 2021-01-01 RX ADMIN — CHLORHEXIDINE GLUCONATE 15 ML: 1.2 RINSE ORAL at 08:03

## 2021-01-01 RX ADMIN — CALCIUM CHLORIDE, MAGNESIUM CHLORIDE, SODIUM CHLORIDE, SODIUM BICARBONATE, POTASSIUM CHLORIDE AND SODIUM PHOSPHATE DIBASIC DIHYDRATE 12.5 ML/KG/HR: 3.68; 3.05; 6.34; 3.09; .314; .187 INJECTION INTRAVENOUS at 16:45

## 2021-01-01 RX ADMIN — ASPIRIN 81 MG CHEWABLE TABLET 81 MG: 81 TABLET CHEWABLE at 07:28

## 2021-01-01 RX ADMIN — FENTANYL CITRATE 200 MCG: 50 INJECTION, SOLUTION INTRAMUSCULAR; INTRAVENOUS at 10:26

## 2021-01-01 RX ADMIN — SODIUM CHLORIDE: 3 INJECTION, SOLUTION INTRAVENOUS at 09:07

## 2021-01-01 RX ADMIN — CALCIUM CHLORIDE, MAGNESIUM CHLORIDE, SODIUM CHLORIDE, SODIUM BICARBONATE, POTASSIUM CHLORIDE AND SODIUM PHOSPHATE DIBASIC DIHYDRATE 12.5 ML/KG/HR: 3.68; 3.05; 6.34; 3.09; .314; .187 INJECTION INTRAVENOUS at 20:27

## 2021-01-01 RX ADMIN — AMIODARONE HYDROCHLORIDE 400 MG: 200 TABLET ORAL at 14:10

## 2021-01-01 RX ADMIN — FENTANYL CITRATE 25 MCG: 50 INJECTION, SOLUTION INTRAMUSCULAR; INTRAVENOUS at 16:32

## 2021-01-01 RX ADMIN — FENTANYL CITRATE 25 MCG: 50 INJECTION, SOLUTION INTRAMUSCULAR; INTRAVENOUS at 22:06

## 2021-01-01 RX ADMIN — CALCIUM GLUCONATE 2 G: 20 INJECTION, SOLUTION INTRAVENOUS at 20:50

## 2021-01-01 RX ADMIN — AMIODARONE HYDROCHLORIDE 400 MG: 200 TABLET ORAL at 08:01

## 2021-01-01 RX ADMIN — Medication 4 MG/HR: at 02:10

## 2021-01-01 RX ADMIN — ASPIRIN 81 MG CHEWABLE TABLET 81 MG: 81 TABLET CHEWABLE at 07:53

## 2021-01-01 RX ADMIN — Medication 2 PACKET: at 13:39

## 2021-01-01 RX ADMIN — CALCIUM GLUCONATE 2 G: 20 INJECTION, SOLUTION INTRAVENOUS at 22:51

## 2021-01-01 RX ADMIN — CALCIUM CHLORIDE, MAGNESIUM CHLORIDE, DEXTROSE MONOHYDRATE, LACTIC ACID, SODIUM CHLORIDE, SODIUM BICARBONATE AND POTASSIUM CHLORIDE 12.5 ML/KG/HR: 5.15; 2.03; 22; 5.4; 6.46; 3.09; .157 INJECTION INTRAVENOUS at 06:00

## 2021-01-01 RX ADMIN — CALCIUM CHLORIDE, MAGNESIUM CHLORIDE, DEXTROSE MONOHYDRATE, LACTIC ACID, SODIUM CHLORIDE, SODIUM BICARBONATE AND POTASSIUM CHLORIDE 15 ML/KG/HR: 5.15; 2.03; 22; 5.4; 6.46; 3.09; .157 INJECTION INTRAVENOUS at 06:34

## 2021-01-01 RX ADMIN — FENTANYL CITRATE 25 MCG: 50 INJECTION, SOLUTION INTRAMUSCULAR; INTRAVENOUS at 15:33

## 2021-01-01 RX ADMIN — MIDAZOLAM HYDROCHLORIDE 8 MG: 1 INJECTION, SOLUTION INTRAMUSCULAR; INTRAVENOUS at 10:28

## 2021-01-01 RX ADMIN — CALCIUM GLUCONATE 1 G: 20 INJECTION, SOLUTION INTRAVENOUS at 15:30

## 2021-01-01 RX ADMIN — POTASSIUM CHLORIDE 20 MEQ: 29.8 INJECTION, SOLUTION INTRAVENOUS at 20:24

## 2021-01-01 RX ADMIN — Medication 5 ML: at 07:53

## 2021-01-01 RX ADMIN — MIDAZOLAM 2 MG: 1 INJECTION INTRAMUSCULAR; INTRAVENOUS at 16:19

## 2021-01-01 RX ADMIN — AMIODARONE HYDROCHLORIDE 0.5 MG/MIN: 50 INJECTION, SOLUTION INTRAVENOUS at 17:23

## 2021-01-01 RX ADMIN — VASOPRESSIN 2 UNITS/HR: 20 INJECTION INTRAVENOUS at 12:28

## 2021-01-01 RX ADMIN — CALCIUM CHLORIDE, MAGNESIUM CHLORIDE, DEXTROSE MONOHYDRATE, LACTIC ACID, SODIUM CHLORIDE, SODIUM BICARBONATE AND POTASSIUM CHLORIDE 15 ML/KG/HR: 5.15; 2.03; 22; 5.4; 6.46; 3.09; .157 INJECTION INTRAVENOUS at 20:54

## 2021-01-01 RX ADMIN — LIDOCAINE HYDROCHLORIDE ANHYDROUS 1 ML: 10 INJECTION, SOLUTION INFILTRATION at 15:51

## 2021-01-01 RX ADMIN — CHLORHEXIDINE GLUCONATE 15 ML: 1.2 RINSE ORAL at 21:51

## 2021-01-01 RX ADMIN — ACETAMINOPHEN 650 MG: 325 TABLET, FILM COATED ORAL at 14:16

## 2021-01-01 RX ADMIN — CALCIUM CHLORIDE, MAGNESIUM CHLORIDE, DEXTROSE MONOHYDRATE, LACTIC ACID, SODIUM CHLORIDE, SODIUM BICARBONATE AND POTASSIUM CHLORIDE: 5.15; 2.03; 22; 5.4; 6.46; 3.09; .157 INJECTION INTRAVENOUS at 16:41

## 2021-01-01 RX ADMIN — CALCIUM CHLORIDE, MAGNESIUM CHLORIDE, SODIUM CHLORIDE, SODIUM BICARBONATE, POTASSIUM CHLORIDE AND SODIUM PHOSPHATE DIBASIC DIHYDRATE: 3.68; 3.05; 6.34; 3.09; .314; .187 INJECTION INTRAVENOUS at 15:55

## 2021-01-01 RX ADMIN — PANTOPRAZOLE SODIUM 40 MG: 40 INJECTION, POWDER, FOR SOLUTION INTRAVENOUS at 07:28

## 2021-01-01 RX ADMIN — FENTANYL CITRATE 100 MCG: 50 INJECTION, SOLUTION INTRAMUSCULAR; INTRAVENOUS at 08:40

## 2021-01-01 RX ADMIN — SODIUM CHLORIDE: 3 INJECTION, SOLUTION INTRAVENOUS at 12:53

## 2021-01-01 RX ADMIN — CALCIUM CHLORIDE, MAGNESIUM CHLORIDE, SODIUM CHLORIDE, SODIUM BICARBONATE, POTASSIUM CHLORIDE AND SODIUM PHOSPHATE DIBASIC DIHYDRATE: 3.68; 3.05; 6.34; 3.09; .314; .187 INJECTION INTRAVENOUS at 17:42

## 2021-01-01 RX ADMIN — EPINEPHRINE 0.07 MCG/KG/MIN: 1 INJECTION PARENTERAL at 12:29

## 2021-01-01 RX ADMIN — Medication 2 PACKET: at 14:07

## 2021-01-01 RX ADMIN — HEPARIN SODIUM 1500 UNITS/HR: 10000 INJECTION, SOLUTION INTRAVENOUS at 07:50

## 2021-01-01 RX ADMIN — Medication 4 MG/HR: at 06:26

## 2021-01-01 RX ADMIN — PIPERACILLIN SODIUM AND TAZOBACTAM SODIUM 4.5 G: 4; .5 INJECTION, POWDER, LYOPHILIZED, FOR SOLUTION INTRAVENOUS at 08:01

## 2021-01-01 RX ADMIN — Medication 5 ML: at 08:02

## 2021-01-01 RX ADMIN — ATROPINE SULFATE 1 DROP: 10 SOLUTION/ DROPS OPHTHALMIC at 18:10

## 2021-01-01 RX ADMIN — HEPARIN SODIUM 1200 UNITS/HR: 10000 INJECTION, SOLUTION INTRAVENOUS at 18:30

## 2021-01-01 RX ADMIN — MIDAZOLAM HYDROCHLORIDE 4 MG/HR: 1 INJECTION, SOLUTION INTRAVENOUS at 10:45

## 2021-01-01 RX ADMIN — PIPERACILLIN SODIUM AND TAZOBACTAM SODIUM 4.5 G: 4; .5 INJECTION, POWDER, LYOPHILIZED, FOR SOLUTION INTRAVENOUS at 20:13

## 2021-01-01 RX ADMIN — MAGNESIUM SULFATE IN WATER 2 G: 40 INJECTION, SOLUTION INTRAVENOUS at 23:08

## 2021-01-01 RX ADMIN — CALCIUM CHLORIDE, MAGNESIUM CHLORIDE, DEXTROSE MONOHYDRATE, LACTIC ACID, SODIUM CHLORIDE, SODIUM BICARBONATE AND POTASSIUM CHLORIDE 15 ML/KG/HR: 5.15; 2.03; 22; 5.4; 6.46; 3.09; .157 INJECTION INTRAVENOUS at 20:53

## 2021-01-01 RX ADMIN — SODIUM CHLORIDE, PRESERVATIVE FREE 10000 UNITS: 5 INJECTION INTRAVENOUS at 11:16

## 2021-01-01 RX ADMIN — HEPARIN SODIUM 5000 UNITS: 10000 INJECTION, SOLUTION INTRAVENOUS; SUBCUTANEOUS at 17:29

## 2021-01-01 RX ADMIN — CHLORHEXIDINE GLUCONATE 15 ML: 1.2 RINSE ORAL at 07:54

## 2021-01-01 RX ADMIN — ASPIRIN 81 MG CHEWABLE TABLET 81 MG: 81 TABLET CHEWABLE at 07:52

## 2021-01-01 RX ADMIN — FENTANYL CITRATE 200 MCG: 50 INJECTION, SOLUTION INTRAMUSCULAR; INTRAVENOUS at 11:41

## 2021-01-01 RX ADMIN — HEPARIN SODIUM 10000 UNITS: 1000 INJECTION INTRAVENOUS; SUBCUTANEOUS at 10:19

## 2021-01-01 RX ADMIN — Medication 2 PACKET: at 13:49

## 2021-01-01 RX ADMIN — Medication 75 MCG/HR: at 11:19

## 2021-01-01 RX ADMIN — CALCIUM CHLORIDE, MAGNESIUM CHLORIDE, SODIUM CHLORIDE, SODIUM BICARBONATE, POTASSIUM CHLORIDE AND SODIUM PHOSPHATE DIBASIC DIHYDRATE 12.5 ML/KG/HR: 3.68; 3.05; 6.34; 3.09; .314; .187 INJECTION INTRAVENOUS at 08:30

## 2021-01-01 RX ADMIN — PIPERACILLIN SODIUM AND TAZOBACTAM SODIUM 4.5 G: 4; .5 INJECTION, POWDER, LYOPHILIZED, FOR SOLUTION INTRAVENOUS at 08:03

## 2021-01-01 RX ADMIN — PANTOPRAZOLE SODIUM 40 MG: 40 INJECTION, POWDER, FOR SOLUTION INTRAVENOUS at 15:06

## 2021-01-01 RX ADMIN — SODIUM CHLORIDE, PRESERVATIVE FREE 3000 UNITS: 5 INJECTION INTRAVENOUS at 11:04

## 2021-01-01 RX ADMIN — CALCIUM CHLORIDE, MAGNESIUM CHLORIDE, SODIUM CHLORIDE, SODIUM BICARBONATE, POTASSIUM CHLORIDE AND SODIUM PHOSPHATE DIBASIC DIHYDRATE 12.5 ML/KG/HR: 3.68; 3.05; 6.34; 3.09; .314; .187 INJECTION INTRAVENOUS at 10:31

## 2021-01-01 RX ADMIN — BUSPIRONE HYDROCHLORIDE 30 MG: 15 TABLET ORAL at 12:07

## 2021-01-01 RX ADMIN — CALCIUM CHLORIDE, MAGNESIUM CHLORIDE, SODIUM CHLORIDE, SODIUM BICARBONATE, POTASSIUM CHLORIDE AND SODIUM PHOSPHATE DIBASIC DIHYDRATE 12.5 ML/KG/HR: 3.68; 3.05; 6.34; 3.09; .314; .187 INJECTION INTRAVENOUS at 06:51

## 2021-01-01 RX ADMIN — CALCIUM CHLORIDE, MAGNESIUM CHLORIDE, SODIUM CHLORIDE, SODIUM BICARBONATE, POTASSIUM CHLORIDE AND SODIUM PHOSPHATE DIBASIC DIHYDRATE 12.5 ML/KG/HR: 3.68; 3.05; 6.34; 3.09; .314; .187 INJECTION INTRAVENOUS at 10:33

## 2021-01-01 RX ADMIN — MIDAZOLAM 2 MG: 1 INJECTION INTRAMUSCULAR; INTRAVENOUS at 18:53

## 2021-01-01 RX ADMIN — ACETAMINOPHEN 650 MG: 325 TABLET, FILM COATED ORAL at 20:11

## 2021-01-01 RX ADMIN — CALCIUM CHLORIDE, MAGNESIUM CHLORIDE, SODIUM CHLORIDE, SODIUM BICARBONATE, POTASSIUM CHLORIDE AND SODIUM PHOSPHATE DIBASIC DIHYDRATE 12.5 ML/KG/HR: 3.68; 3.05; 6.34; 3.09; .314; .187 INJECTION INTRAVENOUS at 23:35

## 2021-01-01 RX ADMIN — ACETAMINOPHEN 650 MG: 325 TABLET, FILM COATED ORAL at 07:35

## 2021-01-01 RX ADMIN — CALCIUM CHLORIDE, MAGNESIUM CHLORIDE, DEXTROSE MONOHYDRATE, LACTIC ACID, SODIUM CHLORIDE, SODIUM BICARBONATE AND POTASSIUM CHLORIDE 15 ML/KG/HR: 5.15; 2.03; 22; 5.4; 6.46; 3.09; .157 INJECTION INTRAVENOUS at 06:35

## 2021-01-01 RX ADMIN — Medication 40 MG: at 07:52

## 2021-01-01 RX ADMIN — Medication 5 ML: at 07:35

## 2021-01-01 RX ADMIN — SODIUM CHLORIDE, POTASSIUM CHLORIDE, SODIUM LACTATE AND CALCIUM CHLORIDE 500 ML: 600; 310; 30; 20 INJECTION, SOLUTION INTRAVENOUS at 22:30

## 2021-01-01 RX ADMIN — HEPARIN SODIUM 5000 UNITS: 10000 INJECTION, SOLUTION INTRAVENOUS; SUBCUTANEOUS at 18:24

## 2021-01-01 RX ADMIN — CHLORHEXIDINE GLUCONATE 15 ML: 1.2 RINSE ORAL at 08:00

## 2021-01-01 RX ADMIN — CALCIUM CHLORIDE, MAGNESIUM CHLORIDE, SODIUM CHLORIDE, SODIUM BICARBONATE, POTASSIUM CHLORIDE AND SODIUM PHOSPHATE DIBASIC DIHYDRATE 12.5 ML/KG/HR: 3.68; 3.05; 6.34; 3.09; .314; .187 INJECTION INTRAVENOUS at 20:36

## 2021-01-01 RX ADMIN — MIDAZOLAM 2 MG: 1 INJECTION INTRAMUSCULAR; INTRAVENOUS at 16:31

## 2021-01-01 RX ADMIN — HUMAN INSULIN 7 UNITS/HR: 100 INJECTION, SOLUTION SUBCUTANEOUS at 22:02

## 2021-01-01 RX ADMIN — Medication 2 PACKET: at 20:15

## 2021-01-01 RX ADMIN — POTASSIUM CHLORIDE 20 MEQ: 29.8 INJECTION, SOLUTION INTRAVENOUS at 16:28

## 2021-01-01 RX ADMIN — DOCUSATE SODIUM 50 MG AND SENNOSIDES 8.6 MG 1 TABLET: 8.6; 5 TABLET, FILM COATED ORAL at 20:15

## 2021-01-01 RX ADMIN — PIPERACILLIN SODIUM AND TAZOBACTAM SODIUM 4.5 G: 4; .5 INJECTION, POWDER, LYOPHILIZED, FOR SOLUTION INTRAVENOUS at 19:33

## 2021-01-01 RX ADMIN — CALCIUM CHLORIDE, MAGNESIUM CHLORIDE, SODIUM CHLORIDE, SODIUM BICARBONATE, POTASSIUM CHLORIDE AND SODIUM PHOSPHATE DIBASIC DIHYDRATE 12.5 ML/KG/HR: 3.68; 3.05; 6.34; 3.09; .314; .187 INJECTION INTRAVENOUS at 14:26

## 2021-01-01 RX ADMIN — HEPARIN SODIUM 5000 UNITS: 10000 INJECTION, SOLUTION INTRAVENOUS; SUBCUTANEOUS at 05:15

## 2021-01-01 RX ADMIN — MAGNESIUM SULFATE HEPTAHYDRATE 2 G: 40 INJECTION, SOLUTION INTRAVENOUS at 10:53

## 2021-01-01 RX ADMIN — ATROPINE SULFATE 1 DROP: 10 SOLUTION/ DROPS OPHTHALMIC at 18:46

## 2021-01-01 RX ADMIN — CALCIUM CHLORIDE, MAGNESIUM CHLORIDE, SODIUM CHLORIDE, SODIUM BICARBONATE, POTASSIUM CHLORIDE AND SODIUM PHOSPHATE DIBASIC DIHYDRATE 12.5 ML/KG/HR: 3.68; 3.05; 6.34; 3.09; .314; .187 INJECTION INTRAVENOUS at 00:19

## 2021-01-01 RX ADMIN — SODIUM BICARBONATE 50 MEQ: 84 INJECTION INTRAVENOUS at 18:49

## 2021-01-01 RX ADMIN — HEPARIN SODIUM 780 UNITS/HR: 10000 INJECTION, SOLUTION INTRAVENOUS at 15:13

## 2021-01-01 RX ADMIN — Medication 2 PACKET: at 20:12

## 2021-01-01 RX ADMIN — FENTANYL CITRATE 150 MCG: 50 INJECTION, SOLUTION INTRAMUSCULAR; INTRAVENOUS at 09:37

## 2021-01-01 RX ADMIN — PIPERACILLIN SODIUM AND TAZOBACTAM SODIUM 4.5 G: 4; .5 INJECTION, POWDER, LYOPHILIZED, FOR SOLUTION INTRAVENOUS at 13:41

## 2021-01-01 RX ADMIN — FENTANYL CITRATE 25 MCG: 50 INJECTION, SOLUTION INTRAMUSCULAR; INTRAVENOUS at 20:38

## 2021-01-01 RX ADMIN — PIPERACILLIN AND TAZOBACTAM 3.38 G: 3; .375 INJECTION, POWDER, LYOPHILIZED, FOR SOLUTION INTRAVENOUS at 15:07

## 2021-01-01 RX ADMIN — Medication 2 PACKET: at 14:03

## 2021-01-01 RX ADMIN — ACETAMINOPHEN 650 MG: 325 TABLET, FILM COATED ORAL at 01:49

## 2021-01-01 RX ADMIN — CALCIUM CHLORIDE, MAGNESIUM CHLORIDE, SODIUM CHLORIDE, SODIUM BICARBONATE, POTASSIUM CHLORIDE AND SODIUM PHOSPHATE DIBASIC DIHYDRATE 12.5 ML/KG/HR: 3.68; 3.05; 6.34; 3.09; .314; .187 INJECTION INTRAVENOUS at 21:21

## 2021-01-01 RX ADMIN — PIPERACILLIN AND TAZOBACTAM 3.38 G: 3; .375 INJECTION, POWDER, LYOPHILIZED, FOR SOLUTION INTRAVENOUS at 19:33

## 2021-01-01 RX ADMIN — CALCIUM CHLORIDE, MAGNESIUM CHLORIDE, DEXTROSE MONOHYDRATE, LACTIC ACID, SODIUM CHLORIDE, SODIUM BICARBONATE AND POTASSIUM CHLORIDE 12.5 ML/KG/HR: 5.15; 2.03; 22; 5.4; 6.46; 3.09; .157 INJECTION INTRAVENOUS at 23:10

## 2021-01-01 RX ADMIN — FENTANYL CITRATE 300 MCG: 50 INJECTION, SOLUTION INTRAMUSCULAR; INTRAVENOUS at 11:18

## 2021-01-01 RX ADMIN — HUMAN INSULIN 4 UNITS/HR: 100 INJECTION, SOLUTION SUBCUTANEOUS at 15:54

## 2021-01-01 RX ADMIN — CHLORHEXIDINE GLUCONATE 15 ML: 1.2 RINSE ORAL at 19:27

## 2021-01-01 RX ADMIN — CALCIUM CHLORIDE, MAGNESIUM CHLORIDE, DEXTROSE MONOHYDRATE, LACTIC ACID, SODIUM CHLORIDE, SODIUM BICARBONATE AND POTASSIUM CHLORIDE 12.5 ML/KG/HR: 5.15; 2.03; 22; 5.4; 6.46; 3.09; .157 INJECTION INTRAVENOUS at 20:29

## 2021-01-01 RX ADMIN — PIPERACILLIN SODIUM AND TAZOBACTAM SODIUM 4.5 G: 4; .5 INJECTION, POWDER, LYOPHILIZED, FOR SOLUTION INTRAVENOUS at 01:12

## 2021-01-01 RX ADMIN — CHLORHEXIDINE GLUCONATE 15 ML: 1.2 RINSE ORAL at 19:33

## 2021-01-01 RX ADMIN — CHLORHEXIDINE GLUCONATE 15 ML: 1.2 RINSE ORAL at 19:34

## 2021-01-01 RX ADMIN — MIDAZOLAM 2 MG: 1 INJECTION INTRAMUSCULAR; INTRAVENOUS at 17:11

## 2021-01-01 RX ADMIN — Medication 1 DROP: at 06:39

## 2021-01-01 RX ADMIN — ACETAMINOPHEN 650 MG: 325 TABLET, FILM COATED ORAL at 14:10

## 2021-01-01 RX ADMIN — CALCIUM CHLORIDE, MAGNESIUM CHLORIDE, SODIUM CHLORIDE, SODIUM BICARBONATE, POTASSIUM CHLORIDE AND SODIUM PHOSPHATE DIBASIC DIHYDRATE 12.5 ML/KG/HR: 3.68; 3.05; 6.34; 3.09; .314; .187 INJECTION INTRAVENOUS at 12:15

## 2021-01-01 RX ADMIN — CALCIUM CHLORIDE, MAGNESIUM CHLORIDE, SODIUM CHLORIDE, SODIUM BICARBONATE, POTASSIUM CHLORIDE AND SODIUM PHOSPHATE DIBASIC DIHYDRATE 12.5 ML/KG/HR: 3.68; 3.05; 6.34; 3.09; .314; .187 INJECTION INTRAVENOUS at 10:47

## 2021-01-01 RX ADMIN — DOCUSATE SODIUM 50 MG AND SENNOSIDES 8.6 MG 1 TABLET: 8.6; 5 TABLET, FILM COATED ORAL at 08:01

## 2021-01-01 RX ADMIN — MIDAZOLAM 2 MG: 1 INJECTION INTRAMUSCULAR; INTRAVENOUS at 03:48

## 2021-01-01 RX ADMIN — Medication 4 MG/HR: at 10:53

## 2021-01-01 RX ADMIN — Medication 2 PACKET: at 07:35

## 2021-01-01 RX ADMIN — SODIUM CHLORIDE: 3 INJECTION, SOLUTION INTRAVENOUS at 19:07

## 2021-01-01 RX ADMIN — AMIODARONE HYDROCHLORIDE 400 MG: 200 TABLET ORAL at 07:52

## 2021-01-01 RX ADMIN — VANCOMYCIN HYDROCHLORIDE 1750 MG: 1 INJECTION, POWDER, LYOPHILIZED, FOR SOLUTION INTRAVENOUS at 16:49

## 2021-01-01 RX ADMIN — CALCIUM CHLORIDE, MAGNESIUM CHLORIDE, SODIUM CHLORIDE, SODIUM BICARBONATE, POTASSIUM CHLORIDE AND SODIUM PHOSPHATE DIBASIC DIHYDRATE: 3.68; 3.05; 6.34; 3.09; .314; .187 INJECTION INTRAVENOUS at 16:46

## 2021-01-01 RX ADMIN — CALCIUM CHLORIDE, MAGNESIUM CHLORIDE, DEXTROSE MONOHYDRATE, LACTIC ACID, SODIUM CHLORIDE, SODIUM BICARBONATE AND POTASSIUM CHLORIDE 12.5 ML/KG/HR: 5.15; 2.03; 22; 5.4; 6.46; 3.09; .157 INJECTION INTRAVENOUS at 11:09

## 2021-01-01 RX ADMIN — MINERAL OIL AND PETROLATUM: 150; 830 OINTMENT OPHTHALMIC at 21:49

## 2021-01-01 RX ADMIN — CALCIUM CHLORIDE, MAGNESIUM CHLORIDE, DEXTROSE MONOHYDRATE, LACTIC ACID, SODIUM CHLORIDE, SODIUM BICARBONATE AND POTASSIUM CHLORIDE 15 ML/KG/HR: 5.15; 2.03; 22; 5.4; 6.46; 3.09; .157 INJECTION INTRAVENOUS at 14:56

## 2021-01-01 RX ADMIN — CHLORHEXIDINE GLUCONATE 15 ML: 1.2 RINSE ORAL at 07:34

## 2021-01-01 RX ADMIN — CALCIUM CHLORIDE, MAGNESIUM CHLORIDE, SODIUM CHLORIDE, SODIUM BICARBONATE, POTASSIUM CHLORIDE AND SODIUM PHOSPHATE DIBASIC DIHYDRATE 12.5 ML/KG/HR: 3.68; 3.05; 6.34; 3.09; .314; .187 INJECTION INTRAVENOUS at 17:43

## 2021-01-01 RX ADMIN — PIPERACILLIN SODIUM AND TAZOBACTAM SODIUM 4.5 G: 4; .5 INJECTION, POWDER, LYOPHILIZED, FOR SOLUTION INTRAVENOUS at 14:09

## 2021-01-01 RX ADMIN — MIDAZOLAM 2 MG: 1 INJECTION INTRAMUSCULAR; INTRAVENOUS at 05:33

## 2021-01-01 RX ADMIN — DOCUSATE SODIUM 50 MG AND SENNOSIDES 8.6 MG 1 TABLET: 8.6; 5 TABLET, FILM COATED ORAL at 20:11

## 2021-01-01 RX ADMIN — ROCURONIUM BROMIDE 50 MG: 50 INJECTION, SOLUTION INTRAVENOUS at 11:23

## 2021-01-01 RX ADMIN — AMIODARONE HYDROCHLORIDE 400 MG: 200 TABLET ORAL at 07:35

## 2021-01-01 RX ADMIN — ACETAMINOPHEN 650 MG: 325 TABLET, FILM COATED ORAL at 20:14

## 2021-01-01 RX ADMIN — AMIODARONE HYDROCHLORIDE 400 MG: 200 TABLET ORAL at 20:14

## 2021-01-01 RX ADMIN — HEPARIN SODIUM 800 UNITS/HR: 10000 INJECTION, SOLUTION INTRAVENOUS at 10:11

## 2021-01-01 RX ADMIN — CARVEDILOL 6.25 MG: 6.25 TABLET, FILM COATED ORAL at 07:52

## 2021-01-01 RX ADMIN — CALCIUM CHLORIDE, MAGNESIUM CHLORIDE, SODIUM CHLORIDE, SODIUM BICARBONATE, POTASSIUM CHLORIDE AND SODIUM PHOSPHATE DIBASIC DIHYDRATE 12.5 ML/KG/HR: 3.68; 3.05; 6.34; 3.09; .314; .187 INJECTION INTRAVENOUS at 07:42

## 2021-01-01 RX ADMIN — AMIODARONE HYDROCHLORIDE 1 MG/MIN: 50 INJECTION, SOLUTION INTRAVENOUS at 15:48

## 2021-01-01 RX ADMIN — HYDRALAZINE HYDROCHLORIDE 10 MG: 20 INJECTION INTRAMUSCULAR; INTRAVENOUS at 07:14

## 2021-01-01 RX ADMIN — PIPERACILLIN SODIUM AND TAZOBACTAM SODIUM 4.5 G: 4; .5 INJECTION, POWDER, LYOPHILIZED, FOR SOLUTION INTRAVENOUS at 13:49

## 2021-01-01 RX ADMIN — ACETAMINOPHEN 650 MG: 325 TABLET, FILM COATED ORAL at 20:15

## 2021-01-01 RX ADMIN — MIDAZOLAM 2 MG: 1 INJECTION INTRAMUSCULAR; INTRAVENOUS at 18:12

## 2021-01-01 RX ADMIN — ASPIRIN 81 MG CHEWABLE TABLET 81 MG: 81 TABLET CHEWABLE at 08:01

## 2021-01-01 RX ADMIN — Medication 5 ML: at 08:04

## 2021-01-01 RX ADMIN — PIPERACILLIN SODIUM AND TAZOBACTAM SODIUM 4.5 G: 4; .5 INJECTION, POWDER, LYOPHILIZED, FOR SOLUTION INTRAVENOUS at 02:15

## 2021-01-01 RX ADMIN — Medication 40 MG: at 08:03

## 2021-01-01 RX ADMIN — PIPERACILLIN AND TAZOBACTAM 3.38 G: 3; .375 INJECTION, POWDER, LYOPHILIZED, FOR SOLUTION INTRAVENOUS at 07:28

## 2021-01-01 RX ADMIN — LORAZEPAM 0.5 MG: 2 INJECTION INTRAMUSCULAR; INTRAVENOUS at 14:25

## 2021-01-01 RX ADMIN — ACETAMINOPHEN 650 MG: 325 TABLET, FILM COATED ORAL at 14:09

## 2021-01-01 RX ADMIN — Medication 5 ML: at 12:19

## 2021-01-01 RX ADMIN — ACETAMINOPHEN 650 MG: 325 TABLET, FILM COATED ORAL at 19:44

## 2021-01-01 RX ADMIN — FENTANYL CITRATE 150 MCG: 50 INJECTION, SOLUTION INTRAMUSCULAR; INTRAVENOUS at 10:53

## 2021-01-01 RX ADMIN — POLYETHYLENE GLYCOL 3350 17 G: 17 POWDER, FOR SOLUTION ORAL at 08:01

## 2021-01-01 RX ADMIN — CALCIUM CHLORIDE, MAGNESIUM CHLORIDE, SODIUM CHLORIDE, SODIUM BICARBONATE, POTASSIUM CHLORIDE AND SODIUM PHOSPHATE DIBASIC DIHYDRATE 12.5 ML/KG/HR: 3.68; 3.05; 6.34; 3.09; .314; .187 INJECTION INTRAVENOUS at 14:23

## 2021-01-01 RX ADMIN — Medication 0.03 MCG/KG/MIN: at 14:53

## 2021-01-01 RX ADMIN — FENTANYL CITRATE 100 MCG: 50 INJECTION, SOLUTION INTRAMUSCULAR; INTRAVENOUS at 10:45

## 2021-01-01 RX ADMIN — Medication 10 ML: at 14:06

## 2021-01-01 RX ADMIN — SODIUM BICARBONATE 100 MEQ: 84 INJECTION INTRAVENOUS at 16:48

## 2021-01-01 RX ADMIN — PIPERACILLIN AND TAZOBACTAM 3.38 G: 3; .375 INJECTION, POWDER, LYOPHILIZED, FOR SOLUTION INTRAVENOUS at 14:09

## 2021-01-01 RX ADMIN — POTASSIUM CHLORIDE 20 MEQ: 29.8 INJECTION, SOLUTION INTRAVENOUS at 17:56

## 2021-01-01 RX ADMIN — Medication 2 PACKET: at 08:02

## 2021-01-01 RX ADMIN — ACETAMINOPHEN 650 MG: 325 TABLET, FILM COATED ORAL at 01:53

## 2021-01-01 RX ADMIN — AMIODARONE HYDROCHLORIDE 400 MG: 200 TABLET ORAL at 19:33

## 2021-01-01 RX ADMIN — CARVEDILOL 6.25 MG: 6.25 TABLET, FILM COATED ORAL at 07:35

## 2021-01-01 RX ADMIN — MINERAL OIL AND PETROLATUM: 150; 830 OINTMENT OPHTHALMIC at 02:00

## 2021-01-01 RX ADMIN — FENTANYL CITRATE 25 MCG: 50 INJECTION, SOLUTION INTRAMUSCULAR; INTRAVENOUS at 21:49

## 2021-01-01 RX ADMIN — MIDAZOLAM 2 MG: 1 INJECTION INTRAMUSCULAR; INTRAVENOUS at 20:35

## 2021-01-01 RX ADMIN — ACETAMINOPHEN 650 MG: 325 TABLET, FILM COATED ORAL at 10:53

## 2021-01-01 RX ADMIN — SODIUM PHOSPHATE, MONOBASIC, MONOHYDRATE AND SODIUM PHOSPHATE, DIBASIC, ANHYDROUS 15 MMOL: 276; 142 INJECTION, SOLUTION INTRAVENOUS at 05:57

## 2021-01-01 RX ADMIN — GLYCOPYRROLATE 0.1 MG: 0.2 INJECTION INTRAMUSCULAR; INTRAVENOUS at 17:33

## 2021-01-01 RX ADMIN — CALCIUM CHLORIDE, MAGNESIUM CHLORIDE, SODIUM CHLORIDE, SODIUM BICARBONATE, POTASSIUM CHLORIDE AND SODIUM PHOSPHATE DIBASIC DIHYDRATE 12.5 ML/KG/HR: 3.68; 3.05; 6.34; 3.09; .314; .187 INJECTION INTRAVENOUS at 21:19

## 2021-01-01 RX ADMIN — CARVEDILOL 6.25 MG: 6.25 TABLET, FILM COATED ORAL at 19:27

## 2021-01-01 RX ADMIN — CALCIUM CHLORIDE, MAGNESIUM CHLORIDE, SODIUM CHLORIDE, SODIUM BICARBONATE, POTASSIUM CHLORIDE AND SODIUM PHOSPHATE DIBASIC DIHYDRATE 12.5 ML/KG/HR: 3.68; 3.05; 6.34; 3.09; .314; .187 INJECTION INTRAVENOUS at 03:38

## 2021-01-01 RX ADMIN — MIDAZOLAM 2 MG: 1 INJECTION INTRAMUSCULAR; INTRAVENOUS at 17:33

## 2021-01-01 RX ADMIN — PIPERACILLIN SODIUM AND TAZOBACTAM SODIUM 4.5 G: 4; .5 INJECTION, POWDER, LYOPHILIZED, FOR SOLUTION INTRAVENOUS at 14:10

## 2021-01-01 RX ADMIN — FENTANYL CITRATE 25 MCG: 50 INJECTION, SOLUTION INTRAMUSCULAR; INTRAVENOUS at 01:00

## 2021-01-01 RX ADMIN — CALCIUM CHLORIDE, MAGNESIUM CHLORIDE, SODIUM CHLORIDE, SODIUM BICARBONATE, POTASSIUM CHLORIDE AND SODIUM PHOSPHATE DIBASIC DIHYDRATE 12.5 ML/KG/HR: 3.68; 3.05; 6.34; 3.09; .314; .187 INJECTION INTRAVENOUS at 07:44

## 2021-01-01 RX ADMIN — ACETAMINOPHEN 650 MG: 325 TABLET, FILM COATED ORAL at 13:49

## 2021-01-01 RX ADMIN — CALCIUM CHLORIDE 1 G: 100 INJECTION, SOLUTION INTRAVENOUS at 15:48

## 2021-01-01 RX ADMIN — PIPERACILLIN SODIUM AND TAZOBACTAM SODIUM 4.5 G: 4; .5 INJECTION, POWDER, LYOPHILIZED, FOR SOLUTION INTRAVENOUS at 03:08

## 2021-01-01 RX ADMIN — SODIUM CHLORIDE: 3 INJECTION, SOLUTION INTRAVENOUS at 02:25

## 2021-01-01 RX ADMIN — Medication 2 MG/HR: at 15:28

## 2021-01-01 RX ADMIN — ATROPINE SULFATE 1 DROP: 10 SOLUTION/ DROPS OPHTHALMIC at 06:45

## 2021-01-01 RX ADMIN — CALCIUM GLUCONATE 2 G: 20 INJECTION, SOLUTION INTRAVENOUS at 19:04

## 2021-01-01 RX ADMIN — PANTOPRAZOLE SODIUM 40 MG: 40 INJECTION, POWDER, FOR SOLUTION INTRAVENOUS at 08:01

## 2021-01-01 RX ADMIN — PIPERACILLIN AND TAZOBACTAM 3.38 G: 3; .375 INJECTION, POWDER, LYOPHILIZED, FOR SOLUTION INTRAVENOUS at 01:46

## 2021-01-01 RX ADMIN — PIPERACILLIN SODIUM AND TAZOBACTAM SODIUM 4.5 G: 4; .5 INJECTION, POWDER, LYOPHILIZED, FOR SOLUTION INTRAVENOUS at 07:53

## 2021-01-01 RX ADMIN — ROCURONIUM BROMIDE 100 MG: 50 INJECTION, SOLUTION INTRAVENOUS at 10:31

## 2021-01-01 RX ADMIN — PROTAMINE SULFATE 40 MG: 10 INJECTION, SOLUTION INTRAVENOUS at 10:05

## 2021-01-01 RX ADMIN — POLYETHYLENE GLYCOL 3350 17 G: 17 POWDER, FOR SOLUTION ORAL at 12:19

## 2021-01-01 RX ADMIN — DEXTROSE MONOHYDRATE: 100 INJECTION, SOLUTION INTRAVENOUS at 17:24

## 2021-01-01 RX ADMIN — ATROPINE SULFATE 1 DROP: 10 SOLUTION/ DROPS OPHTHALMIC at 08:06

## 2021-01-01 RX ADMIN — Medication 4 MG/HR: at 05:20

## 2021-01-01 RX ADMIN — CALCIUM GLUCONATE 2 G: 20 INJECTION, SOLUTION INTRAVENOUS at 06:15

## 2021-01-01 RX ADMIN — HEPARIN SODIUM 5000 UNITS: 10000 INJECTION, SOLUTION INTRAVENOUS; SUBCUTANEOUS at 06:37

## 2021-01-01 RX ADMIN — MIDAZOLAM 2 MG: 1 INJECTION INTRAMUSCULAR; INTRAVENOUS at 01:59

## 2021-01-01 RX ADMIN — ATROPINE SULFATE 1 DROP: 10 SOLUTION/ DROPS OPHTHALMIC at 15:37

## 2021-01-01 RX ADMIN — Medication 2 PACKET: at 19:28

## 2021-01-01 RX ADMIN — PIPERACILLIN SODIUM AND TAZOBACTAM SODIUM 4.5 G: 4; .5 INJECTION, POWDER, LYOPHILIZED, FOR SOLUTION INTRAVENOUS at 20:15

## 2021-01-01 RX ADMIN — ACETAMINOPHEN 650 MG: 325 TABLET, FILM COATED ORAL at 13:41

## 2021-01-01 RX ADMIN — FENTANYL CITRATE 25 MCG: 50 INJECTION, SOLUTION INTRAMUSCULAR; INTRAVENOUS at 21:25

## 2021-01-01 RX ADMIN — ASPIRIN 81 MG CHEWABLE TABLET 81 MG: 81 TABLET CHEWABLE at 07:35

## 2021-01-01 RX ADMIN — ACETAMINOPHEN 650 MG: 325 TABLET, FILM COATED ORAL at 09:20

## 2021-01-01 RX ADMIN — Medication 4 MG/HR: at 12:28

## 2021-01-01 RX ADMIN — CARVEDILOL 6.25 MG: 6.25 TABLET, FILM COATED ORAL at 10:31

## 2021-01-01 RX ADMIN — ACETAMINOPHEN 650 MG: 325 TABLET, FILM COATED ORAL at 07:52

## 2021-01-01 RX ADMIN — CALCIUM CHLORIDE, MAGNESIUM CHLORIDE, SODIUM CHLORIDE, SODIUM BICARBONATE, POTASSIUM CHLORIDE AND SODIUM PHOSPHATE DIBASIC DIHYDRATE 12.5 ML/KG/HR: 3.68; 3.05; 6.34; 3.09; .314; .187 INJECTION INTRAVENOUS at 17:42

## 2021-01-01 RX ADMIN — ACETAMINOPHEN 650 MG: 325 TABLET, FILM COATED ORAL at 03:08

## 2021-01-01 RX ADMIN — CALCIUM CHLORIDE, MAGNESIUM CHLORIDE, DEXTROSE MONOHYDRATE, LACTIC ACID, SODIUM CHLORIDE, SODIUM BICARBONATE AND POTASSIUM CHLORIDE: 5.15; 2.03; 22; 5.4; 6.46; 3.09; .157 INJECTION INTRAVENOUS at 18:37

## 2021-01-01 RX ADMIN — MINERAL OIL AND PETROLATUM: 150; 830 OINTMENT OPHTHALMIC at 17:10

## 2021-01-01 RX ADMIN — HEPARIN SODIUM 5000 UNITS: 10000 INJECTION, SOLUTION INTRAVENOUS; SUBCUTANEOUS at 05:33

## 2021-01-01 RX ADMIN — PIPERACILLIN AND TAZOBACTAM 3.38 G: 3; .375 INJECTION, POWDER, LYOPHILIZED, FOR SOLUTION INTRAVENOUS at 19:40

## 2021-01-01 RX ADMIN — CALCIUM CHLORIDE, MAGNESIUM CHLORIDE, SODIUM CHLORIDE, SODIUM BICARBONATE, POTASSIUM CHLORIDE AND SODIUM PHOSPHATE DIBASIC DIHYDRATE 12.5 ML/KG/HR: 3.68; 3.05; 6.34; 3.09; .314; .187 INJECTION INTRAVENOUS at 11:31

## 2021-01-01 ASSESSMENT — ACTIVITIES OF DAILY LIVING (ADL)
ADLS_ACUITY_SCORE: 15
ADLS_ACUITY_SCORE: 13
ADLS_ACUITY_SCORE: 15
ADLS_ACUITY_SCORE: 13
ADLS_ACUITY_SCORE: 15
ADLS_ACUITY_SCORE: 15
ADLS_ACUITY_SCORE: 13
ADLS_ACUITY_SCORE: 15
ADLS_ACUITY_SCORE: 13
ADLS_ACUITY_SCORE: 17
ADLS_ACUITY_SCORE: 15
ADLS_ACUITY_SCORE: 13
ADLS_ACUITY_SCORE: 15
ADLS_ACUITY_SCORE: 13
ADLS_ACUITY_SCORE: 13
ADLS_ACUITY_SCORE: 22
ADLS_ACUITY_SCORE: 13
ADLS_ACUITY_SCORE: 15
ADLS_ACUITY_SCORE: 13
ADLS_ACUITY_SCORE: 15
ADLS_ACUITY_SCORE: 13
ADLS_ACUITY_SCORE: 15
ADLS_ACUITY_SCORE: 13
ADLS_ACUITY_SCORE: 15
ADLS_ACUITY_SCORE: 13
ADLS_ACUITY_SCORE: 15
ADLS_ACUITY_SCORE: 13
ADLS_ACUITY_SCORE: 16
ADLS_ACUITY_SCORE: 13
ADLS_ACUITY_SCORE: 13
ADLS_ACUITY_SCORE: 22
ADLS_ACUITY_SCORE: 13
ADLS_ACUITY_SCORE: 15
ADLS_ACUITY_SCORE: 13
ADLS_ACUITY_SCORE: 15
ADLS_ACUITY_SCORE: 15
ADLS_ACUITY_SCORE: 13
ADLS_ACUITY_SCORE: 17
ADLS_ACUITY_SCORE: 22
ADLS_ACUITY_SCORE: 13

## 2021-01-01 ASSESSMENT — MIFFLIN-ST. JEOR
SCORE: 1902.13
SCORE: 1848.13
SCORE: 1926.13
SCORE: 1928.13
SCORE: 1868.13

## 2021-01-01 ASSESSMENT — ENCOUNTER SYMPTOMS
DYSRHYTHMIAS: 1
SEIZURES: 0

## 2021-01-01 ASSESSMENT — LIFESTYLE VARIABLES: TOBACCO_USE: 0

## 2021-10-17 PROBLEM — I46.9 CARDIAC ARREST (H): Status: ACTIVE | Noted: 2021-01-01

## 2021-10-17 NOTE — ED PROVIDER NOTES
Pt care documented under different MRN once patient demographics known.  That ED H&P by me is already complete, and I will therefore not document anything further under this MRN.    MD Dilia Abel Jeffrey Alan, MD  10/25/21 1780

## 2021-10-17 NOTE — PROGRESS NOTES
Brief cardiology note:  I was called by the emergency department in regards to this patient.  The patient had an out-of-hospital cardiac arrest.  The patient had multiple shocks in the field with CPR.  The rhythm was thought to be ventricular fibrillation.  Following defibrillation the rhythm on the EMS was concerning for STEMI.  However after review of the strip it was not clearly a STEMI and more so a wide-complex ventricular rhythm.  At the time I was called the patient he had achieved a pulse but shortly thereafter had another V. fib arrest.  Our interventional cardiologist Dr. Sapp was also made aware of the case.  Our interventional colleagues elected to await emergent Cath Lab procedure until the patient had achieved a pulse.  After multiple rounds of epinephrine, multiple shocks and amiodarone the patient achieved a pulse.   was conferenced in regards to ECMO.   arrived shortly after his conference to cannulate for ECMO.  The plan going forward is to assess the patient after ECMO cannulation and decide if emergent coronary angiogram is indicated.  This will be a discussion between  and Dr. Sapp.

## 2021-10-17 NOTE — PHARMACY-VANCOMYCIN DOSING SERVICE
"      Pharmacy Vancomycin Initial Note  Date of Service 2021  Patient's  1955  66 year old, male    Indication: Aspiration Pneumonia/ECMO x 5 days    Current estimated CrCl = Estimated Creatinine Clearance: 62 mL/min (A) (based on SCr of 1.56 mg/dL (H)).    Creatinine for last 3 days  10/17/2021:  1:53 PM Creatinine 1.56 mg/dL    Recent Vancomycin Level(s) for last 3 days  No results found for requested labs within last 72 hours.      Vancomycin IV Administrations (past 72 hours)      No vancomycin orders with administrations in past 72 hours.                Nephrotoxins and other renal medications (From now, onward)    Start     Dose/Rate Route Frequency Ordered Stop    10/17/21 1600  vancomycin (VANCOCIN) 2,250 mg in sodium chloride 0.9 % 250 mL intermittent infusion      2,250 mg (central catheter)  over 90 Minutes Intravenous EVERY 24 HOURS 10/17/21 1536 10/22/21 1559    10/17/21 1400  norepinephrine (LEVOPHED) 4 mg in sodium chloride 0.9 % 250 mL infusion CENTRAL      0.03-0.4 mcg/kg/min × 112.5 kg  12.7-168.8 mL/hr  Intravenous CONTINUOUS 10/17/21 1346      10/17/21 1400  piperacillin-tazobactam (ZOSYN) 3.375 g vial to attach to  mL bag     Note to Pharmacy: For [facility], [facility] and [facility]: For Zosyn-naive patients, use the \"Zosyn initial dose + extended infusion\" order panel.    3.375 g  over 30 Minutes Intravenous EVERY 6 HOURS 10/17/21 1346 10/22/21 1359    10/17/21 1400  vasopressin 0.2 units/mL in NS (PITRESSIN) standard conc infusion      0.5-4 Units/hr  2.5-20 mL/hr  Intravenous CONTINUOUS 10/17/21 1346            Contrast Orders - past 72 hours (72h ago, onward)    Start     Dose/Rate Route Frequency Ordered Stop    10/17/21 1330  iopamidol (ISOVUE-370) solution 73 mL      73 mL Intravenous ONCE 10/17/21 1323 10/17/21 1331              Plan:  1. Start vancomycin  2250 mg iv q24h x 5 days (per ECMO protocol)   2. Vancomycin monitoring method: Trough (Method 2 = manual dose " calculation)  3. Vancomycin therapeutic monitoring goal: 15-20 mg/L  4. Pharmacy will check vancomycin levels as appropriate in 1-3 Days.    5. Serum creatinine levels will be ordered daily for the first week of therapy and at least twice weekly for subsequent weeks.      Aby García, ParvezD

## 2021-10-17 NOTE — PROGRESS NOTES
SPIRITUAL HEALTH SERVICES Progress Note     Briefly met with pt, Shin's wife, Sariah and niece Katiana outside room. Shin was recently admitted and is on ECMO. Pt is [employer]. Katiana works at [employer] and is helping family understand Shin's medical situation. Sariah requested a  for anointing. Sariah stated the family has no other spiritual or emotional needs currently.         I called the on-call , who is aware of the family's request for anointing and stated he will try to come either this evening or tomorrow 10/18.      [facility] remains available for emotional and spiritual support upon request.    Calos Chin   On-Call Chaplain Resident

## 2021-10-17 NOTE — PROGRESS NOTES
ECLS Admission Note:    Date Arrived: 10/17/2021  Time Arrived: 1245  Cannulated at: [facility]  Cannulation time: 1023    Arterial Cannula: 17 Fr. long In the Right Femoral Artery      Venous Cannula: 25 Fr. In the Right Femoral Vein      ECMO components include [employer] Lot # [id number], oxy lot # [id number]    Patient transported to [facility] by [facility].  Met patient in cath lab without incident. Cannula placement was verified by fluoroscopy, cannula position is good.    Richie Patel, RT  ECMO Specialist  10/17/2021 3:37 PM

## 2021-10-17 NOTE — PROGRESS NOTES
Preliminary EEG report:    First hour of video EEG was reviewed.  There is marked generalized suppression of the background with no electrocerebral activity above 3  V, consistent with profound global cerebral dysfunction.    Coby Pettit MD  [phone]

## 2021-10-17 NOTE — Clinical Note
dry, intact, no bleeding and no hematoma. All lines secured in place with sutures by CV fellow. Ioban left in place over ECMO cannulas. Transparent dressing applied to arterial line and both lines in the left groin. All LDAs added and assessed (see flowsheets).

## 2021-10-17 NOTE — CONSULTS
"[facility]: [place]  NeuroCritical Care Consult    Patient Name:  Shin Smith  MRN:  [id number]    :  1955  Date of Service:  2021  Primary care provider:  Lyle Denton      Reason for Consult: Asked by CSI to see Shin Smith for Neuro prognostication     History of Present Illness:   Shin Smith is a 66 year old male admitted on 10/17/2021 with a no known PMH of who presents Post cardiac arrest. Wife reports that this morning pt was \"breathing funning\" and snoring. Pt was unresponsive, 911 called and wife began CPR. EMS arrived shortly after and found pt to be in VF. 3 shocks. 450 amio, 3mg epi. ROSC was achieved and transferred to [facility] where pt again developed VF. He received 4 more shocks and decision was made to cannulate for [facility] ECMO.     Pt will be cooled to 34 C  CT with no acute pathology    -Versed 4      ROS: A 10-point ROS was performed as per HPI.    NEURO RECS  - Recommend vEEG while on sedation  - Avoid hypotonic solutions as they may worsen cerebral edema  - Avoid \"nitroprusside\",\"nitroglycerin\" as they may also worsen cerbral edema.  - Advise slow correction of any high Sodiums if needed  - When safe to do so, limitation of CNS acting medications will permit a more accurate neurological assessment  - We will continue to follow and monitor their EEG and neurologic exam, please call #[phone] with any questions    Thank you for this consultation.    PMH:  Past Medical History:   Diagnosis Date     Hemorrhoids      Hypertension      Past Surgical History:   Procedure Laterality Date     COLONOSCOPY  2012    Procedure:COLONOSCOPY; Surgeon:SHERLYN ORLANDO; Location:[facility]     HEMORRHOIDECTOMY INTERNAL  2012    Procedure:HEMORRHOIDECTOMY INTERNAL; INTRAOPERATIVE COLONOSCOPY, HEMORRHOIDECTOMY, PROCTOPLASTY (POSSIBLE LIGASURE); Surgeon:SHERLYN ORLANDO; Location:[facility]     NO HISTORY OF SURGERY       PROCTOPLASTY  2012    " Procedure:PROCTOPLASTY; Surgeon:SHERLYN ORLANDO; Location:[facility]       Allergies:  No Known Allergies    Medications:      Current Facility-Administered Medications:      artificial tears ophthalmic ointment, , Both Eyes, Q8H, Piedad Langston PA-C     [START ON 10/18/2021] aspirin (ASA) chewable tablet 81 mg, 81 mg, Per Feeding Tube, Daily, Piedad Langston PA-C     calcium chloride in  mL intermittent infusion 1 g, 1 g, Intravenous, Q6H PRN, Piedad Langston PA-C     cisatracurium (NIMBEX) 200 mg in D5W 100 mL infusion, 1-10 mcg/kg/min, Intravenous, Continuous, Piedad Langston PA-C     EPINEPHrine (ADRENALIN) 5 mg in sodium chloride 0.9 % 250 mL infusion, 0.03-0.3 mcg/kg/min, Intravenous, Continuous, Piedad Langston PA-C, Last Rate: 13.5 mL/hr at 10/17/21 1400, 0.04 mcg/kg/min at 10/17/21 1400     fentaNYL (SUBLIMAZE) 50 mcg/mL bolus from infusion pump 50 mcg, 50 mcg, Intravenous, Q30 Min PRN, Piedad Langston PA-C     fentaNYL (SUBLIMAZE) infusion,  mcg/hr, Intravenous, Continuous, Piedad Langston PA-C     heparin (PRESSURE BAG) 2 Units/mL in 0.9% NaCl (500 mL), 3 mL/hr, Intravenous, Continuous, Rich Chester MD     heparin ANTICOAGULANT bolus dose from infusion pump 388-776 Units, 5-10 Units/kg (Ideal), Intravenous, Q30 Min PRN, iPedad Langston PA-C     lidocaine (LMX4) cream, , Topical, Q1H PRN, Piedad Langston PA-C     lidocaine 1 % 0.1-1 mL, 0.1-1 mL, Other, Q1H PRN, Piedad Langston PA-ERIK     magnesium sulfate 2 g in water intermittent infusion, 2 g, Intravenous, Daily PRN, Piedad Langston PA-ERIK     magnesium sulfate 4 g in 100 mL sterile water (premade), 4 g, Intravenous, Q4H PRN, Piedad Langston PA-C     midazolam (VERSED) drip - ADULT 100 mg/100 mL in NS (pre-mix), 1-8 mg/hr, Intravenous, Continuous, Piedad Langston PA-C, Last Rate: 4 mL/hr at 10/17/21 1400, 4 mg/hr at 10/17/21 1400     midazolam (VERSED) injection 1-3 mg, 1-3  mg, Intravenous, Q1H PRN, Piedad Langston PA-ERIK     naloxone (NARCAN) injection 0.2 mg, 0.2 mg, Intravenous, Q2 Min PRN **OR** naloxone (NARCAN) injection 0.4 mg, 0.4 mg, Intravenous, Q2 Min PRN **OR** naloxone (NARCAN) injection 0.2 mg, 0.2 mg, Intramuscular, Q2 Min PRN **OR** naloxone (NARCAN) injection 0.4 mg, 0.4 mg, Intramuscular, Q2 Min PRN, Rich Chester MD     nitroPRUsside (NIPRIDE) 50 mg, sodium thiosulfate 500 mg in D5W 125 mL IV infusion (conc: 0.4 mg/mL), 0.25-5 mcg/kg/min, Intravenous, Continuous, Piedad Langston PA-ERIK     norepinephrine (LEVOPHED) 4 mg in sodium chloride 0.9 % 250 mL infusion CENTRAL, 0.03-0.4 mcg/kg/min, Intravenous, Continuous, Piedad Langston PA-ERIK     pantoprazole (PROTONIX) IV push injection 40 mg, 40 mg, Intravenous, Daily, Piedad Langsotn PA-ERIK     phenylephrine (SOPHIE-SYNEPHRINE) 50 mg in NaCl 0.9 % 250 mL infusion, 0.5-6 mcg/kg/min, Intravenous, Continuous, Piedad Langston PA-ERIK     piperacillin-tazobactam (ZOSYN) 3.375 g vial to attach to  mL bag, 3.375 g, Intravenous, Q6H, Piedad Langston PA-ERIK     potassium chloride 20 mEq in 50 mL intermittent infusion, 20 mEq, Intravenous, Q1H PRN, Piedad Langston PA-ERIK     sodium chloride (PF) 0.9% PF flush 3 mL, 3 mL, Intracatheter, Q8H PRN, Piedad Langston PA-ERIK     sodium chloride (PF) 0.9% PF flush 3 mL, 3 mL, Intracatheter, q1 min prn, Piedad Langston PA-ERIK     sodium phosphate 10 mmol in D5W intermittent infusion, 10 mmol, Intravenous, Daily PRN, Piedad Langston PA-ERIK     sodium phosphate 15 mmol in D5W intermittent infusion, 15 mmol, Intravenous, Daily PRN, Piedad Langston, PA-C     sodium phosphate 20 mmol in D5W intermittent infusion, 20 mmol, Intravenous, Q6H PRN, Piedad Langston, PA-C     sodium phosphate 25 mmol in D5W intermittent infusion, 25 mmol, Intravenous, Q8H PRN, Piedad Langston, PA-C     vasopressin 0.2 units/mL in NS (PITRESSIN) standard conc  "infusion, 0.5-4 Units/hr, Intravenous, Continuous, Piedad Langston PA-C, Last Rate: 5 mL/hr at 10/17/21 1400, 1 Units/hr at 10/17/21 1400    Social History:  Social History     Tobacco Use     Smoking status: Never Smoker   Substance Use Topics     Alcohol use: No       Family History:    No family history on file.    Physical Examination:   Pulse 73   Temp (!) 87.3  F (30.7  C)   Resp 16   Ht 1.829 m (6' 0.01\")   SpO2 96%   BMI 33.63 kg/m    General: Adult male patient, lying in bed, NAD  HEENT: ETT  Cardiac: Tele  Pulm: Vent  Abdomen: Soft, bowel sounds present  Extremities: cool, no edema  Skin: No rash or lesion     Neuro:  Pt is sedated. Pupils equal and reactive. Not opening eyes or following commands. No cough or gag. 0/5 in 4/4 to noxious stim.     I have personally reviewed all labs and imaging for this patient.      Patient discussed with attending neurologist, Dr. Chad Paige, JERRY  Ascom: *[phone]   "

## 2021-10-17 NOTE — Clinical Note
Patient arrives via EMS intubated on ECMO. Infusing - Terese, Epi, Versed. Dr. Chester with patient for transfer. Plan for Coronary Angiogram.

## 2021-10-17 NOTE — ED PROVIDER NOTES
History   Chief Complaint:  Cardiac Arrest     HPI   history limited by patient's unresponsiveness.  History provided initially by paramedics, and later supplemented by chart review and discussion with his wife who arrived in the emergency department.    Shin Smith is a 66 year old male with a history of hypertension who presents by EMS with cardiac arrest. Per EMS, the patient's wife heard the patient moan from the living room of their home, at which time she went immediately to him and found him unresponsive and not breathing.  She began CPR and called 911 immediately.  EMS arrived on scene at 0842. EMS performed CPR for about 5 minutes before the patient's pulse returned.  They describe initially an agonal rhythm though this soon changed to ventricular fibrillation for which she received a total of 4 defibrillations, ultimately achieving ROSC before ED arrival.  He was intubated by EMS without difficulty and without paralytics or sedating medication.  Intraosseous line placed by paramedics.  1 dose of epinephrine given prehospital.  Blood sugar was not checked.  No known trauma.  No signs of foul play on scene according to paramedics.  Paramedics noted that the patient was maintaining his own pulse upon time of transfer into the emergency department resuscitation bay.  The mobile ECMO program has been contacted by paramedics, though the initial determination was that he is not an appropriate candidate for the mobile ECMO.    Review of Systems   Unable to perform ROS: Patient unresponsive     Allergies:  The patient has no known allergies.     Medications:  Atenolol  Demerol    Past Medical History:    Hemorrhoids  Hypertension     Past Surgical History:    Hemorrhoidectomy internal  Proctoplasty      Social History:  The patient was brought to the emergency department by EMS.  PCP: Lyle Denton   Marital Status:     Physical Exam     Patient Vitals for the past 24 hrs:   BP Pulse Resp SpO2 Weight    10/17/21 1020 113/84 69 23 95 % --   10/17/21 1015 95/83 62 30 97 % --   10/17/21 1010 (!) 79/37 51 20 100 % --   10/17/21 1005 (!) 123/113 (!) 48 21 -- --   10/17/21 1002 (!) 65/53 (!) 46 19 -- --   10/17/21 1000 (!) 117/95 (!) 46 17 -- --   10/17/21 0958 (!) 152/141 (!) 46 18 -- --   10/17/21 0955 (!) 67/43 (!) 44 15 -- --   10/17/21 0951 -- -- -- -- 112.5 kg (248 lb 0.3 oz)   10/17/21 0950 (!) 65/38 (!) 46 17 -- --   10/17/21 0945 -- 55 21 -- --   10/17/21 0942 98/68 60 14 -- --     Physical Exam  General: Unresponsive male recumbent in stabilization room 1  HENT: No evidence of significant facial trauma.  Eyes: Pupils equal, round, midsize, slightly reactive bilaterally, no nystagmus  CV: Initially with palpable femoral pulses.  Patient slightly pale in extremities though appropriately warm throughout.  Slightly bradycardic.  Proximal tibial intraosseous line in place infusing well  Resp: Unlabored, patient intubated with endotracheal tube, with condensation noted in rhythmic pattern on the line of the endotracheal tube.  He is being bagged without difficulty by the respiratory therapist.  Breath sounds slightly coarse bilaterally.  Occasional spontaneous respiratory effort is noted.  GI: Abdomen soft, no appreciable focal masses to abdomen  MSK: No apparent major extremity trauma  Skin: Slight acral pallor though no cyanosis, no mottling  Neuro: Nonverbal, does not follow commands, GCS 3, no posturing, no response to pain in any extremity  Psych: Nonverbal so unable to fully assess      Emergency Department Course   ECG  ECG taken at 0921, ECG read at 0923  Unclear rhythm. Wide complexes. Irregular.  Rate 137 bpm. WI interval 104 ms. QRS duration 4 ms. QT/QTc 324/489 ms. P-R-T axes -65 0 -18.       Procedure -defibrillation for ventricular tachycardia and ventricular fibrillation  Performed by: CANDI Dobbs MD  Emergent consent.  Early in the patient's emergency department course, he experienced recurrent  cardiac arrest, requiring electrical defibrillation for ventricular tachycardia and ventricular fibrillation using 200 J with pads placed in the anterior and posterior position.  This was performed a total of 5 times, after which CPR was resumed immediately.  After the final effort, the complexes were noted to change on the rhythm strip of the monitor, he was found to have his own pulses.  There were no apparent complications noted to these multiple defibrillation's.        ED Bedside Limited Ultrasound  Body area scanned: cardiac limited  Performed by: Abdelrahman Dobbs MD  Indication: Cardiac arrest  Findings/Interpretation: Initial views demonstrated quivering of the myocardium but no meaningful contractility, consistent with cardiac arrest in his pulseless state.  Later, this noted some global cardiac contractility, still with evidence of decreased ejection fraction though compatible with the palpable pulses at that time.  No large pericardial effusion seen.  I was not able to digitally archived images from these ultrasounds because the patient had not yet been officially registered at that time and, given patient acuity and instability, I did not have the opportunity to officially enter his demographic information and the ultrasound was seen prior to capturing these images.      Laboratory:  CBC: WBC: 13.2 (H), HGB: 14.8, PLT: 200    CMP: Glucose 299 (H), Carbon Dioxide: 17 (L), Creatinine: 1.36 (H), GFR: 37 (L), Calcium: 8.0 (L), Albumin: 3.1 (L), Protein Total: 6.4 (L), ALT: 362 (H), AST: 273 (H), o/w WNL     Troponin (Collected 0930): 0.184 (HH)    PTT: 43 (H)    INR: 1.46 (H)    Lactic Acid (Resulted 0944): 7.33 (H)    ISTAT Gases (Lactate) Venous POCT: Lactic Acid: 8.2 (HH) / Bicarbonate: 23 / O2 Sat: 20 (L) / pCO2 79 (HH) / pH: 7.07 (LL) / pO2: 22 (L)    Glucose by Meter (0923): 155 (H)    ABO/Rh Type & Screen: B Positive, antibody negative    Emergency Department Course:  Before the patient arrived in  the emergency department, I spent several minutes discussing the prehospital report from paramedics with the ED charge nurse, heart, and several nurses.  I also prepared for the patient's arrival with respiratory therapist in stabilization room 1.    0916 The patient arrived in the emergency department.    0918 I consulted with Dr. Helm, of cardiology, regarding the patient's presentation and plan.  He agreed to come promptly to the emergency department participate in the patient's assessment and care.    0923 The patient became pulseless, CPR initiated. JEANNE placed.    0928 Patient defibrillated with 200 J.    0930 Patient defibrillated with 200 J.     0933 Patient defibrillated with 200 J.    0935 I consulted with Dr. Helm, once again, regarding the plan for the patient.    0936 Patient defibrillated with 200 J.    0938 Patient defibrillated with 200 J. Amiodarone 1 mg/min IV started.    0940 Pulse check - patient found to have pulse.     0942 I consulted with Dr. Chester by phone.  He states that the patient is a candidate for ECMO, and request that we activate the team.  I spoke with the [facility] as well as the ED charge nurse who began immediate preparations.    0945 ECMO activation.    0949 ECMO team arrival in ED. ECMO team present and cannulating the patient, please see their record for further details.    0952 I spoke with the [facility].    0954 I spoke with the nurse supervisor.    0958 Patient's systolic blood pressure is in the 60s, I spoke with the nurse and pharmacist. Phenylephrine 200 mcg given IV.    1000 Epinephrine 33.8 mL/Hr IV started.    9114-5283 I spoke with the patient's wife and provided her with an update.     1016 I was notified of the patient's troponin results.     1019 Heparin 10,000 units given IV.    1026 Fentanyl 100 mcg given IV.    1027 Cath lab at the [facility] activated, transport initiated at this time.    1028 Versed 8 mg given IV.    1031 Rocuronium 100 mg  IV.    1044 Dr. Chester still at bedside, I spoke with the nurse.    1045 Versed 4 mL/Hr IV infusion started.    1049 I completed transfer paperwork for the patient.     1050 Amiodarone IV infusion stopped.    1053 I spoke with the [facility] regarding the patient's plan.    1055 I rechecked the patient.     1104 Heparin 3,000 units intracatheter.    1111 I rechecked the patient. Dr. Chester is still at bedside.     1116 Heparin 10,00 units intracatheter.    1118 Fentanyl 300 mcg IV    1123 Rocuronium 50 mg IV.    1141 Fentanyl 200 mcg IV.    1718 - 1735   Additional time spent charting for this case    Disposition:  The patient was transferred to the [facility] via ECMO-capable ambulance. Dr. Chester accepted the patient for transfer at the [facility]    Impression & Plan   Medical Decision Making:  He was obviously critically ill upon first arrival, though did have his own pulses.  I am grateful for the prompt assistance of the cardiology service, who is already nearby in the emergency department involved in another case.  Unfortunately, the patient did lose pulses soon after arrival, requiring multiple additional defibrillations which ultimately achieved ROSC yet again.  Sometime after the patient had arrived, and after initial history of been provided, a paramedic returned to state that one of the initial electrocardiograms had shown ST elevation, though the strip was not available to us, and this historical information was not provided upon patient arrival.  Given the patient's need for multiple shocks, both prehospital and in the emergency department, for combination of ventricular fibrillation and ventricular tachycardia, I spoke with Dr. Chester regarding possible ECMO candidacy, and the patient was deemed to be a candidate for ECMO.  He was subsequently promptly cannulated here in the emergency department and placed on the ECMO circuit.  Not long thereafter, I was able to  speak with the patient's wife, accompanied by several neighbors, and updated her on his critical illness, the plan of care currently in place, and she expressed understanding of this and agreed that we would continue to attempt every possible resuscitative measures are indicated in an effort to save his life.  I did make it clear that this may still prove to be a fatal event, or have profound long-term morbidity, though we do consider that he has some chance of meaningful survival.      From a respiratory standpoint, he had been intubated by paramedics without difficulty, and he did have good fogging of the endotracheal tube and was able to be bagged easily, without evidence of abdominal distention.  I therefore did not feel that further manipulation of his endotracheal tube was indicated.  Chest imaging could not be properly obtained due to his unstable state and recurrent cardiac arrest.  Alternate etiologies of his cardiac arrest, beyond the presumed acute coronary syndrome, were considered, including but not limited to, pulmonary embolism, aortic dissection, hypoglycemia, trauma, hypothermia, electrolyte disturbances and many others.  Because the catheterization lab at [facility] was currently occupied at the time of patient's ED course, he was transferred by lights and sirens in an ECMO capable ambulance to the [facility] where he will proceed directly to the coronary catheterization lab for further diagnostic and hopefully therapeutic purposes, followed by ICU care thereafter.  Dr. Chester was extensively involved that bedside here at [facility], and is also the accepting physician at the [facility] where he also has clinical privileges and will continue to provide critical care for this patient.    Diagnosis:    ICD-10-CM    1. Cardiac arrest (H)  I46.9    2. Ventricular fibrillation (H)  I49.01      Critical Care Time: Over 120 minutes, independent of procedures.  This included time spent  obtaining a history and physical, reviewing the patient's chart, interpreting lab and imaging studies, re-examining the patient, coordinating care with other providers, and documenting ED care provided.  He was obviously critically ill with cardiogenic shock requiring defibrillation, management of the ventilator, and coordination of care with numerous medical personnel including cardiology, interventional cardiology, ECMO cardiology physician, ED clinical pharmacist, and many others throughout his prolonged emergency department course.  He was placed emergently on the ECMO circuit in the emergency department and was transferred emergently by ambulance to the [facility] for ongoing critical care.  His condition carries high morbidity and mortality.      Scribe Disclosure:  Lakia ZIMMER, am serving as a scribe at 10:45 AM on 10/17/2021 to document services personally performed by Abdelrahman Dobbs MD, based on my observations and the provider's statements to me.     This note was completed in part using Dragon voice recognition software. Although reviewed after completion, some word and grammatical errors may occur.     October 17, 2021   [facility] Emergency Department     Abdelrahman Dobbs MD  10/17/21 3631

## 2021-10-17 NOTE — H&P
"    [facility]   Critical Care Cardiology - History and Physical    Shin Smith MRN: [id number]  Age: 66 year old, : 1955  Date: 10/17/2021      Assessment and Plan:  Shin Smith is a 66 year old male who was admitted on (Not on file) with VF arrest with ROSC.  The patient was cannulated at [facility] for ongoing hemodynamic instability in the setting of significant bradycardia (appeared junctional escape) and recurrent VF. The patient has no known PMH, but has not seen a doctor for a period of years.  He does not take any daily medications.    The patient's history is provided by his wife.  She awoke at approximately 8:30am and noted that her  was \"breathing funny.\"  He then began to snore.  She attempted to rouse him so as to encourage him to stop snoring.  He would not rouse.  She also then noted that the patient had urinated.  She \"flipped him over\" and could not get him to respond.  She called 911 and began CPR.  She brought him to the floor.  EMS arrived shortly thereafter at which point they began CPR.  He was reportedly found to be in VF.  He received 3 shocks, 450mg of amiodarone and 3mg of epinephrine. ROSC was achieved.  The patient was transported to [facility].  In the ED, the patient again developed VF.  He received 4 additional shocks. His initial lactate was 9.  His initial gas was 7.04/?/78/9. After shocks, patient was found to be in hemodynamically unstable junctional escape (HR approximately 20 bpm).  As such, he was cannulated for [facility] ECMO.      Vizient Diagnosis:  Cardiovascular: Cardiac arrest  Cardiac Arrhythmia: Ventricular fibrillation  Liver failure: acute  Nephrology: Stage 3a (GFR 45-59)  Neurology: Encephalopathy: Other encephalopathy  Anoxic brain damage  Bacterial Pneumonia      Neurology  # Anoxic Brain Damage, probable  - CT head on arrival  - RASS -4 to -5  - Neurocritical care consult; appreciate recommendations  - " permissive hypernatremia  - permissive hypercapnia  - vEEG when able  - hold temp at 34C x24 hours    Cardiovascular  # VF Cardiac Arrest, now on [facility] ECMO  # Cardiomyopathy, non-ischemic  - CT PE today  - echocardiogram pending  - amiodarone ggt  - versed ggt  - norepi ggt  - ECMO cares   - Flow: 3.5L   - Sweep: pending ABG   - ACT: 180-200  - goal directed medical therapies   - Statin: hold given probable liver injury   - BB: hold given pressors   - ACE: hold given pressors  - wean pressors/inotropes as able    Pulmonary  # Acute Hypoxemic Respiratory Failure  Vent settings: CMV 12/450/10/40. ABG pending.  - CT chest, abdomen, pelvis pending  - CXR daily  - ABG Q2 hours  - went vent as able  - consider duoneb    Gastrointestinal, Nutrition  # Shock Liver, probable  - LFTs BID    Renal, Electrolytes  # Renal Injury, probable  - monitor urine output  - K >3, Mg > 2 while cooled    Infectious Disease  # Aspiration Pneumonia, probable  - daily blood cultures while on ECMO  - MRSA swab today  - Pertinent Cultures: NGTD  - Antibiotic History   - Zosyn 10/17 - present   - Vancomycin 10/17 - present    Hematology  # Coagulopathy in the setting of cooling  - PRBC for hbg < 8  - platelet for plt < 50  - FFP for INR > 2  - Cryoprecipitate for fibrinogen < 150    Endocrinology  # Hyperglycemia  - hemoglobin A1c  - insulin drip if needed    MSK/Skin  No active issues      Pertinent Lines  R femoral arterial and venous ECMO cannulae October 17, 2021  L femoral arterial line October 17, 2021  R radial arterial line October 17, 2021  ETT October 17, 2021  Garcia catheter October 17, 2021  OG tube October 17, 2021  Restraint: needed    Current lines are required for patient management    ICU Cares:  Daily CXR: pending CT  Feed/Fluids: TF not indicated while cooled.  Fluids not   GI Prophylaxis: protonix  DVT Prophylaxis: heparin ggt  Bowel Regimen: held while cooled  Anticipated Floor Transfer: N/A    Family Update: wife, updated by  "me    Patient seen and discussed with Dr. Yana Guevara.  Assessment and plan as above.    Piedad Langston PA-C  Critical Care Cardiology  Pager: [phone]      History of Present Illness:  Shin Smith is a 66 year old male who was admitted on (Not on file) with VF arrest with ROSC.  The patient was cannulated at [facility] for ongoing hemodyani instability in the setting of signifaicnt bradycardia (appoeared junctional escape) and recurrent VF. The patient has no known PMH, but has not seen a doctor for a period of years.  He does not take any daily medications.    The patient's history is provided by his wife.  She awoke at approximately 8:30am and noted that her  was \"breathing funny.\"  He then began to snore.  She attempted to rouse him so as to encourage him to stop snoring.  He would not rouse.  She also then noted that the patient had urinated.  She \"flipped him over\" and could not get him to respond.  She called 911 and began CPR.  She brought him to the floor.  EMS arrived shortly thereafter at which point they began CPR.  He was reportedly found to be in VF.  He received 3 shocks, 450mg of amiodarone and 3mg of epinephrine. ROSC was achieved.  The patient was transported to [facility].  In the ED, the patient again developed VF.  He received 4 additional shocks.  After shocks, patient was found to be in hemodynamically unstable junctional escape (HR approximately 20 bpm).  As such, he was cannulated for VA ECMO.    He was transferred to [facility] for coronary angiography given no cath lab availability at [facility].  In the cath lab, he was found to have no obstructive coronary artery disease.      Witnessed arrest (y/n): no  Home or public location (y/n): home  Bystander CPR (y/n): yes  Time of 911 call: 8:30am  Initial rhythm: VF  Did they have intermittent ROSC (y/n): yes  # of shocks: ?7  Epi: 3mg  Amio: 450mg  Bicarb: 4amp ([facility] ED)    Initial " Gas:7.04/?/78/9 per Dr. Chester  Initial Lactic Acid: 9      Objective Findings:  Pulse:  [44-69] 69  Resp:  [14-30] 23  BP: ()/() 113/84  MAP:  [6 mmHg-62 mmHg] 15 mmHg  Arterial Line BP: (7-63)/(3-62) 17/14  SpO2:  [95 %-100 %] 95 %    Physical Exam:  GEN: intubated, sedated  HEENT: no appreciable cranial trauma  Pulm: coarse breath sounds bilaterally  Cardiac: regular rate/rhythm. No murmur, rub, gallop  GI: soft, non distended  Neuro: pupils reactive  Integument: no appreciable skin breakdown  Vascular: LE warm, well perfused    Medications:  pending    Labs:   CMP  Recent Labs   Lab 10/17/21  1257      POTASSIUM 3.1*   *     CBC  Recent Labs   Lab 10/17/21  1257   HGB 14.6     Arterial Blood Gas  Recent Labs   Lab 10/17/21  1257 10/17/21  1108 10/17/21  1052   PH 7.26*  --  7.02*   PCO2 32* 34* 37   PO2 75*  --  157*   HCO3 15* 11* 9*   O2PER  --   --  70       Pertinent Imaging Studies:  Coronary angiogram: final read pending. No obstructive coronary artery disease.    Echo: pending      Piedad Langston PA-C  Critical Care Cardiology  Pager: [phone]

## 2021-10-17 NOTE — PROGRESS NOTES
CARDIAC CATH REPORT:   PROCEDURES PERFORMED:    -- Insertion of Peripheral V-A Extracorporeal Membrane Oxygenation - Cardiohelp      PHYSICIANS:  -- Attending Interventional Cardiology Staff:      INDICATION:  Shin Smith is a 66 year old  Male with h/o intermittent VF and ROSC that arrived in the  who was transported to to the [facility] ED where he had pulses. He subsequently had wide complex tachycardia and rearrested with intermittent and eventually stable ROSC . The team was called due to cardiogenic shock and STEMI     Home or public location (y/n): yes  Bystander CPR (y/n): yes  Time of 911 call:   Initial rhythm: VF  Did they have intermittent ROSC (y/n): yes  # of shocks: 5  Epi:  3mg  Amio:  450mg  Bicarb:  2amps  **    AB./05  PaO2 was good , Lactate 9  F    DESCRIPTION:  --Emergent Consent.  --Sterile prep and procedure.  Local anesthetic with lidocaine.  A standard (18 g) needle with ultrasound guidance was used to establish vascular access using a modified Seldinger technique.  --Access/Lines:    ECMO Cannulas: 17Fr arterial cannula in RFA, 25Fr venous cannula in RFV, 8F distal antegrade flow cannula in the R SFA   Additional Lines: 6F sheath in LFA with IABP, 6FLFV,   --Fluoroscopy time of 1 min.  --Estimated blood loss of 2000 mL.  --See below for procedure details.              SUMMARY:    -Cardiogenic shock from STEMI with recurrent VT  --Successful insertion of Peripheral V-A Extracorporeal Membrane Oxygenation - Cardiohelp - in the right femoral artery and vein  --Successful insertion of a distal antegrade flow cannula in the R SFA  -    Rich Chester MD

## 2021-10-17 NOTE — PROGRESS NOTES
Pt arrived in th ED intubated with 7.5 ETT secured 25 at the lip. Pt was bagged during ECMO cannulation and was placed on ventilator with settings (Rate 14, Vt 550, PEEP 5 and 50% FIO2) per doctor order.

## 2021-10-17 NOTE — Clinical Note
The ECG shows a sinus rhythm and a bundle branch block. ECG rate  = 88 bpm. Occasionally accelerated junctional

## 2021-10-17 NOTE — Clinical Note
Called to 4E PHILIP Bolton. All questions answered. No belongings arrived with patient. Patient transported to CT with CCL RN x2, RT, IABP RT, and ECMO RT. 4E RN available to meet us in CT. All lines and gtts reviewed.

## 2021-10-17 NOTE — ED NOTES
"ED ECMO Primary RN Nursing Note     Patient Name:  Shin Smith    Medical Record Number: [id number]  Today's Date:  October 17, 2021   Procedure:  Patient to be started on ECMO secondary to refractory v-fib arrest    Patient arrived to ED Time:    0918, with pulse, VF at 0924, lucus started    Patient Hx from EMS report:     Down at home, wife called 911, cpr for 5 minutes by PD, shocked 4 times for VF, 1 epi given       Last known \"Well\" time: 8am     Time of 911 call: na      Was bystander CPR performed:              EMS arrival to scene time: approx 0845     Number of shocks given prior to arrival: 4     EMS medications given prior to arrival: epi 1,      Time of last EMS medication: na     Type and size of airway on arrival: 7.5     Rhythm on monitor at arrival: tachy wide complex     Type and location of vascular access on arrival: IO rt tibia        ECMO MD arrival time:  1000    ECMO Team arrival time: 0948      MD first needle stick time:  1011    First istat lactate (venous or arterial) drawn at: art       Results @: 1109 : PH=  7.02                      pCO2=     37                     pO2=      157                    Bicarb= 9                   O2sat %=      97                       Lactic Acid=__12.23 __     First istat electrolyte (E3+) drawn at:          Results @:  : Na+=                         K+=                         Hgb=                         Hct=                                 First istat ACT drawn at: 1116         Results @: 1116 : ACT=      86                       Venous groin cannula #1 placed at: 1018  on    size   Location:     Rt femoral         Arterial groin cannula #2 placed at: 1019  on   size   Location:     Rt femoral           ECMO \"PUMP ON\" time: 1023    Venous access line placed at: 1034   size of line: __6f___, number of lumens:    1         Location:     lt groin         Arterial access line placed at: 1037 size of line: __6f___, number of lumens:    1           " Location:     Lt luis Boyle stopped @: 0940    Distal perfusion line placed at:             Location:              Intubation:     Sedation medications given:     100     mg of  Rocuronium      @  1031         ,          50  mg of    Rocuronium    @      1123     ,      Tube size:            ,  Secured:           cm, at the:                    .    Intubation performed by:        Ems in field                  .        Defib shock given @:             Joules:          Rhythm after shock:                   Defib shock given @:             Joules:          Rhythm after shock:                   Defib shock given @:             Joules:          Rhythm after shock:                   Defib shock given @:             Joules:          Rhythm after shock:                       Medications given during procedure:        Heparin bolus:    72528, 3000, 1000     Units @:    1019, 1104, 1116              0.9% NS bolus:    1000     mL @:    930          0.9% NS bolus:         mL @:              0.9% NS bolus:         mL @:                    Epi bolus:         mg @:              Epi bolus:         mg @:              Epi bolus:         mg @:              Epi bolus:         mg @:                    Sodium Bicarb bolus:    4     amp @:    1101          Sodium Bicarb bolus:         amp @:              Sodium Bicarb bolus:         amp @:                Atropine bolus:         mg @:              Atropine bolus:         mg @:              Atropine bolus:         mg @:                 Vasopressin drip started @:    1001         Rate @:    2.4          Vasopressin drip titrated @:    1104         Rate @:    2          Vasopressin drip titrated @:             Rate @:              Vasopressin drip titrated @:             Rate @:                  Levophed drip started @:    1002         Rate @:    0.4          Levophed drip titrated @:    1026         Rate @:    0.2          Levophed drip titrated @:    104         Rate @:     "stopped          Levophed drip titrated @:             Rate @:                  Epi drip started @:    1000         Rate @:    0.1          EPi drip titrated @:    1013         Rate @:    0.2          Epi drip titrated @:    1026         Rate @:    0.07          Epi drip titrated @:             Rate @:                OTHER meds given:        Versed          given    @:    1028         Dose :    8          Versed gtt         given    @:    1051         Dose :    4          fentanyl         given    @:    1026, 1118         Dose :    200, 300          Albumin 5% 250cc         given    @:    1127         Dose :    250cc         Additional istat results:    istat lactate drawn at: 1109         Results @: 1109 :  PH=                        pCO2=                        pO2=                        Bicarb=                        O2sat %=                          Lactic Acid=__12.23 __     istat electrolyte (E3+) drawn at:          Results @:  :  Na+=                         K+=                         Hgb=                         Hct=                                 istat ACT drawn at:          Results @:  : ACT=                  istat ACT drawn at:          Results @:  : ACT=                  istat ACT drawn at:          Results @:  : ACT=                              Arrival time to \"Pump On\" time TOTAL:     65     minutes      Patient Transported to CV lab at: 1151      Report given to: [facility] cath lab     HA Giles RN    "

## 2021-10-17 NOTE — PROGRESS NOTES
ECMO Shift Summary: 12D      ECMO Equipment:  Console serial number: [id number]  Circuit Lot number: [id number]  Oxygenator Lot number: [id number]      Patient remains on [facility] ECMO, all equipment is functioning and alarms are appropriately set. RPM's 3550 with flow range 3.6-3.8 L/min. Sweep gas is at 2 LPM and FiO2 70%. Circuit remains free of air, clot and fibrin. Cannulas are secure with no bleeding from site. Extremities are cool.    Significant Shift Events: pt being cooled to 34C.   Pt has consent to start CRRT this evening.    Vent settings:  Ventilation Mode: CMV/AC  (Continuous Mandatory Ventilation/ Assist Control)  FiO2 (%): 100 %  Rate Set (breaths/minute): 16 breaths/min  Tidal Volume Set (mL): (S) 500 mL  PEEP (cm H2O): 8 cmH2O  Oxygen Concentration (%): 100 %  Resp: 16  .    Heparin is running at 1200 u/hr, ACT range 142.    Urine output is per RN charting, blood loss was minimal. Product given included 1 FFP.       Intake/Output Summary (Last 24 hours) at 10/17/2021 1813  Last data filed at 10/17/2021 1800  Gross per 24 hour   Intake 835.24 ml   Output 740 ml   Net 95.24 ml       ECHO:  No results found for this or any previous visit.  No results found for this or any previous visit.      CXR:  Recent Results (from the past 24 hour(s))   XR Surgery ERIC Fluoro L/T 5 Min    Narrative    This exam was marked as non-reportable because it will not be read by a   radiologist or a [place] non-radiologist provider.         Cardiac Catheterization    Narrative      No significant CAD.    50 cc L CFA IABP placed at 1:1.    Cooling catheter and R radial arterial line placed.    R groin 17 Fr arterial and 25Fr venous cannula already in place   pre-arrival and secured by the mobile ECMO team.      CT Chest Pulmonary Embolism w Contrast    Narrative    EXAMINATION: CT CHEST PULMONARY EMBOLISM W CONTRAST, 10/17/2021 1:51  PM    CLINICAL HISTORY: PE suspected, high prob    COMPARISON: None    TECHNIQUE: CTA  imaging obtained through the chest with contrast.  Coronal and axial MIP reformatted images obtained.     CONTRAST: Isovue 370    FINDINGS:    Support devices: Endotracheal tube tip is in the mid thoracic trachea.  The superior marker of the intra-aortic bone pump is in the proximal  left subclavian artery. The inferior venous ECMO cannula is in the  inferior cavoatrial junction. Enteric tube tip and sidehole are in the  stomach.    Vessels: No central filling defect consistent with a pulmonary  embolism.  Ascending aorta caliber is within normal limits. 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    Evidence of right heart strain: Mild contrast reflux into the IVC. No  right atrial enlargement. No flattening of the interventricular  septum.    Lower neck: No enlarged supraclavicular nodes are present.  Subcentimeter hypoattenuating nodule in the right thyroid lobe does  not meet size criteria for follow-up imaging.  Mediastinum and Flakita: Multiple prominent mediastinal lymph nodes, none  measuring greater than 1 cm short axis.  Heart and Pericardium: The cardiac chambers are normal in size. No  pericardial fluid or thickening is present.  Airways: The central tracheobronchial tree is clear.  Lungs: Consolidations in the posterior aspects of the upper and lower  lobes bilaterally and the right middle lobe. 1.9 x 1.9 x 1.8 cm solid  nodule in the left upper lobe (series 10 image 99). No large pleural  effusion. No pneumothorax.  Upper abdomen: No pathologic process is present in the imaged portion  of the upper abdomen.  Bones: No acute or aggressive osseous abnormality.         Impression    IMPRESSION:   1. No pulmonary embolism.  2. Bilateral pulmonary consolidations are concerning for infection or  aspiration.  3. Solid nodule in the left upper lobe measures up to 1.9 cm. Consider  CT, PET/CT, or tissue sampling at three months per Fleischner  criteria.  4. Incidentally noted left-sided superior vena  cava. Note internal  jugular, subclavian, and upper extremity PICC lines will follow a  left-sided course in the mediastinum.    I have personally reviewed the examination and initial interpretation  and I agree with the findings.    SHAKILA CHANDLER MD         SYSTEM ID:  [id number]   CT Head w/o Contrast    Narrative    Exam: CT HEAD W/O CONTRAST  10/17/2021 1:51 PM    History: s/p cardiac rehab.    Comparison:  None.    Technique: Using multidetector thin collimation helical acquisition  technique, axial, coronal and sagittal CT images from the skull base  to the vertex were obtained without intravenous contrast.     Findings:  Residual contrast in the venous sinuses and arterial  structures from same-day prior cardiac catheterization.  No intracranial hemorrhage, mass effect, or midline shift. The  ventricles are proportionate to the cerebral sulci. The gray to white  matter differentiation of the cerebral hemispheres is preserved. The  basal cisterns are patent.    The visualized paranasal sinuses are clear. The mastoid air cells are  clear. No acute abnormality of the orbits.      Impression    Impression: No acute intracranial pathology.    I have personally reviewed the examination and initial interpretation  and I agree with the findings.    CYNTHIA CODY MD         SYSTEM ID:  [id number]   CT Chest Abdomen Pelvis w/o Contrast    Narrative    EXAMINATION: CT CHEST ABDOMEN PELVIS W/O CONTRAST, 10/17/2021 1:52 PM    TECHNIQUE:  Helical CT images from the thoracic inlet through the  symphysis pubis were obtained  without contrast.     COMPARISON: Same day CTA chest    HISTORY: Status post cardiac arrest    FINDINGS:    Support devices: Endotracheal tube is in the midthoracic trachea. The  superior marker of the intra-aortic pump is right at the proximal  subclavian artery. Inferior approach venous ECMO cannula tip is in the  inferior cavoatrial junction. Right lower arterial ECMO cannula tip is  in the proximal  common iliac artery. A smaller caliber lower approach  venous catheter tip is in the IVC at the level of the diaphragm.  Enteric tube tip and sidehole are in the stomach.    CHEST:     Lower neck: No enlarged supraclavicular nodes are present. No  actionable nodule is present in the imaged portion of the thyroid  lobes.    Mediastinum and Flakita: Multiple mediastinal lymph nodes, none of which  measure greater than 1 cm in short axis.    Heart and Pericardium: The cardiac chambers are normal in size. No  pericardial fluid or thickening is present.    Thoracic vessels: Normal caliber of the aorta. Incidental variant left  superior vena cava.    Airways: The central tracheobronchial tree is clear.    Lungs: Consolidations in the upper upper and lower lobes bilaterally  and the right middle lobe. 1.9 x 1.9 cm solid pulmonary nodule in the  left upper lobe. No pleural fluid or thickening is present. No  pneumothorax.    ABDOMEN:    Liver: Limited without IV contrast. No focal hepatic lesions.    Gallbladder and bile ducts: 7 mm gallstone. Mild gallbladder wall  thickening measuring up to 7 mm (series 10 image 281). No biliary  ductal dilation.    Spleen: Unremarkable.    Pancreas: Unremarkable.     Adrenals: No nodules.     Kidneys and ureters: No solid lesions. No calculi. No  hydroureteronephrosis.    Bladder: Decompressed with a Garcia catheter in place.    Bowel: No abnormally dilated loops of bowel. Mild colonic  diverticulosis without evidence of diverticulitis.    Appendix: Normal.    Mesentery/Peritoneum: Mild hazy opacification of the upper central  mesenteric fat/retroperitoneum. Trace ascites.    Nodes: Scattered small abdominal nodes, none meeting CT criteria for  pathologic enlargement.    Pelvis: The prostate is enlarged, a nonspecific finding but one that  is typically secondary to BPH.    Bone windows: Sclerosis of the left iliac bone series 6 image 181. No  definite rib fractures.    Vasculature:  Atherosclerotic disease of the abdominal aorta. No  abdominal aortic aneurysm.    Soft tissues: Unremarkable.          Impression    IMPRESSION:   1. Bilateral pulmonary consolidations, favoring aspiration/edema in  the setting of cardiac arrest. Superimposed infection possible.  2. Solid pulmonary nodule in the left upper lobe measuring 1.9 cm. At  a clinically appropriate time, recommend CT, PET CT, or tissue  sampling at three months per Fleischner criteria. In this older CT  chest is available, this should be obtained to see if this is stable.  3. Incidental variant left superior vena cava.  Note internal jugular,  subclavian, and PICC lines will follow an atypical course.   4. Cholelithiasis with mild gallbladder wall thickening.   5. The superior marker of the intra-aortic balloon pump is situated in  the proximal left subclavian artery.  6. ECMO cannula tip in the low right atrium.    I have personally reviewed the examination and initial interpretation  and I agree with the findings.    SHAKILA CHANDLER MD         SYSTEM ID:  [id number]   XR Chest Port 1 View    Narrative     Examination:  XR CHEST PORT 1 VIEW     Date:  10/17/2021 2:57 PM      Clinical Information: Check endotracheal tube placement and ECLS  cannula placement. DO NOT log-roll patient.  Place film under patient  using patient safety handling process.     Additional Information: none    Comparison: CT chest 10/17/2021    Findings:   Inferior approach ECMO cannula tip in the right atrium. Temperature  probe in the mid esophagus. Enteric tube coursing toward the stomach  and off the bottom of the image. Endotracheal tube tip in the the  trachea 4 cm above the son. Intra-aortic balloon pump marker 2 cm  above the son (on prior CT the tip was in the left subclavian  artery).    Left-sided superior vena cava. Perihilar groundglass opacities.  Costophrenic angles are sharp. Degenerative changes in the lower  thoracic spine.      Impression     IMPRESSION:   1. Lines and tubes as above. Note intra-aortic balloon pump marker on  prior CT was in the left subclavian artery origin.  2. Perihilar groundglass opacities. There is likely opacities in the  bilateral bases but these are not well-demonstrated on this chest  x-ray but were present on CT.    Impression:    1. .    SHAKILA CHANDLER MD         SYSTEM ID:  [id number]   XR Chest Port 1 View    Narrative    EXAM: XR CHEST PORT 1 VIEW  10/17/2021 4:18 PM     HISTORY:  IABP malpositioning, s/p repositioning       COMPARISON:  CT 10/17/2021, radiographs 10/17/2021    TECHNIQUE: Portable AP radiographs of the chest    FINDINGS:   Intra-aortic balloon pump superior marker is lower compared to the  recent prior, now at the son.  Endotracheal tube tip projects over  the midthoracic trachea. Esophageal temperature probe at the level of  the son. Enteric tube courses below the diaphragm and beyond the  field-of-view. Inferior approach venous ECMO cannula is stable at the  level of the right atrium.    The trachea is midline. Stable heart size. Slightly improved perihilar  opacities. No pleural effusion or pneumothorax.      Impression    IMPRESSION:   1. Interval inferior repositioning of the intra-aortic balloon pump,  superior marker is now at  the son.    2. Slightly improved perihilar groundglass opacities.  3. Basilar opacities again not well demonstrated on radiographs but  present on the same day CT.    I have personally reviewed the examination and initial interpretation  and I agree with the findings.    SHAKILA CHANDLER MD         SYSTEM ID:  [id number]       Labs:  Recent Labs   Lab 10/17/21  1715 10/17/21  1612 10/17/21  1610 10/17/21  1353 10/17/21  1348 10/17/21  1348 10/17/21  1257 10/17/21  1052   PH 7.35  --  7.31*  --   --  7.32*  7.32* 7.26*  --    PCO2 29*  --  26*  --   --  32*  32* 32*  --    PO2 207*  --  278*  --   --  201*  201* 75*  --    HCO3 16*  --  13*  --   --  17*   17* 15*  --    O2PER 100 100 100  70 100   < > 100  100  --    < >    < > = values in this interval not displayed.       Lab Results   Component Value Date    HGB 14.4 10/17/2021    PHGB 30 (H) 10/17/2021     10/17/2021    FIBR 156 (L) 10/17/2021    INR 1.89 (H) 10/17/2021    PTT 34 10/17/2021    DD >20.00 (HH) 10/17/2021    ANTCH 60 (L) 10/17/2021         Plan is to remain stable on [facility] ECMO.      Aelx Patel, RRT  ECMO Specialist  10/17/2021 6:13 PM

## 2021-10-18 NOTE — PROGRESS NOTES
ECMO Shift Summary:    ECMO Equipment:  Console serial number: [id number]  Circuit Lot number: [id number]  Oxygenator Lot number: [id number]    Patient remains on [facility] ECMO, all equipment is functioning and alarms are appropriately set. RPM's 3550 with flow range 3.4-3.8 L/min. Sweep gas is at 3 LPM and FiO2 60%. There is a moderate amount of clot/fibrin at two corners of the oxygenator with a band of clot/fibrin on the side (noon to 3 o'clock position). Cannulas are secure with a small amount of oozing from the cannulation site. Extremities are cool to touch as the patient is being cooled to 34 degrees Celsius.    Significant Shift Events:   Added pigtails at the beginning of the shift to the venous and arterial limbs with a brief period of ECMO clamping, MAPs stayed above 60. Procedure tolerated very well.  There was rapid clot formation within the oxygenator overnight despite having ACTs above 200 for the majority of the night. Currently in the process of titrating heparin downward. 4 AM INR, PTT, D-dimer, and Hep anti Xa were all elevated.  Lactic acid levels decreased overnight.    Vent settings:  Ventilation Mode: CMV/AC  (Continuous Mandatory Ventilation/ Assist Control)  FiO2 (%): 50 %  Rate Set (breaths/minute): 16 breaths/min  Tidal Volume Set (mL): 500 mL  PEEP (cm H2O): 8 cmH2O  Oxygen Concentration (%): 50 %  Resp: 16  .    Heparin is running at 800 u/hr, ACT range 155-219.    Urine output is adequate, blood loss was a small amount from bleeding at the cannulation site. No blood product or fluid given overnight.       Intake/Output Summary (Last 24 hours) at 10/18/2021 0609  Last data filed at 10/18/2021 0600  Gross per 24 hour   Intake 2212.65 ml   Output 1935 ml   Net 277.65 ml       ECHO:  No results found for this or any previous visit.  No results found for this or any previous visit.      CXR:  Recent Results (from the past 24 hour(s))   XR Surgery ERIC Fluoro L/T 5 Min    Narrative    This exam was  marked as non-reportable because it will not be read by a   radiologist or a [place] non-radiologist provider.         Cardiac Catheterization    Narrative      No significant CAD.    50 cc L CFA IABP placed at 1:1.    Cooling catheter and R radial arterial line placed.    R groin 17 Fr arterial and 25Fr venous cannula already in place   pre-arrival and secured by the mobile ECMO team.      CT Chest Pulmonary Embolism w Contrast    Narrative    EXAMINATION: CT CHEST PULMONARY EMBOLISM W CONTRAST, 10/17/2021 1:51  PM    CLINICAL HISTORY: PE suspected, high prob    COMPARISON: None    TECHNIQUE: CTA imaging obtained through the chest with contrast.  Coronal and axial MIP reformatted images obtained.     CONTRAST: Isovue 370    FINDINGS:    Support devices: Endotracheal tube tip is in the mid thoracic trachea.  The superior marker of the intra-aortic bone pump is in the proximal  left subclavian artery. The inferior venous ECMO cannula is in the  inferior cavoatrial junction. Enteric tube tip and sidehole are in the  stomach.    Vessels: No central filling defect consistent with a pulmonary  embolism.  Ascending aorta caliber is within normal limits. 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    Evidence of right heart strain: Mild contrast reflux into the IVC. No  right atrial enlargement. No flattening of the interventricular  septum.    Lower neck: No enlarged supraclavicular nodes are present.  Subcentimeter hypoattenuating nodule in the right thyroid lobe does  not meet size criteria for follow-up imaging.  Mediastinum and Flakita: Multiple prominent mediastinal lymph nodes, none  measuring greater than 1 cm short axis.  Heart and Pericardium: The cardiac chambers are normal in size. No  pericardial fluid or thickening is present.  Airways: The central tracheobronchial tree is clear.  Lungs: Consolidations in the posterior aspects of the upper and lower  lobes bilaterally and the right middle lobe. 1.9 x  1.9 x 1.8 cm solid  nodule in the left upper lobe (series 10 image 99). No large pleural  effusion. No pneumothorax.  Upper abdomen: No pathologic process is present in the imaged portion  of the upper abdomen.  Bones: No acute or aggressive osseous abnormality.         Impression    IMPRESSION:   1. No pulmonary embolism.  2. Bilateral pulmonary consolidations are concerning for infection or  aspiration.  3. Solid nodule in the left upper lobe measures up to 1.9 cm. Consider  CT, PET/CT, or tissue sampling at three months per Fleischner  criteria.  4. Incidentally noted left-sided superior vena cava. Note internal  jugular, subclavian, and upper extremity PICC lines will follow a  left-sided course in the mediastinum.    I have personally reviewed the examination and initial interpretation  and I agree with the findings.    SHAKILA CHANDLER MD         SYSTEM ID:  [id number]   CT Head w/o Contrast    Narrative    Exam: CT HEAD W/O CONTRAST  10/17/2021 1:51 PM    History: s/p cardiac rehab.    Comparison:  None.    Technique: Using multidetector thin collimation helical acquisition  technique, axial, coronal and sagittal CT images from the skull base  to the vertex were obtained without intravenous contrast.     Findings:  Residual contrast in the venous sinuses and arterial  structures from same-day prior cardiac catheterization.  No intracranial hemorrhage, mass effect, or midline shift. The  ventricles are proportionate to the cerebral sulci. The gray to white  matter differentiation of the cerebral hemispheres is preserved. The  basal cisterns are patent.    The visualized paranasal sinuses are clear. The mastoid air cells are  clear. No acute abnormality of the orbits.      Impression    Impression: No acute intracranial pathology.    I have personally reviewed the examination and initial interpretation  and I agree with the findings.    CYNTHIA CODY MD         SYSTEM ID:  [id number]   CT Chest Abdomen Pelvis  w/o Contrast    Narrative    EXAMINATION: CT CHEST ABDOMEN PELVIS W/O CONTRAST, 10/17/2021 1:52 PM    TECHNIQUE:  Helical CT images from the thoracic inlet through the  symphysis pubis were obtained  without contrast.     COMPARISON: Same day CTA chest    HISTORY: Status post cardiac arrest    FINDINGS:    Support devices: Endotracheal tube is in the midthoracic trachea. The  superior marker of the intra-aortic pump is right at the proximal  subclavian artery. Inferior approach venous ECMO cannula tip is in the  inferior cavoatrial junction. Right lower arterial ECMO cannula tip is  in the proximal common iliac artery. A smaller caliber lower approach  venous catheter tip is in the IVC at the level of the diaphragm.  Enteric tube tip and sidehole are in the stomach.    CHEST:     Lower neck: No enlarged supraclavicular nodes are present. No  actionable nodule is present in the imaged portion of the thyroid  lobes.    Mediastinum and Flakita: Multiple mediastinal lymph nodes, none of which  measure greater than 1 cm in short axis.    Heart and Pericardium: The cardiac chambers are normal in size. No  pericardial fluid or thickening is present.    Thoracic vessels: Normal caliber of the aorta. Incidental variant left  superior vena cava.    Airways: The central tracheobronchial tree is clear.    Lungs: Consolidations in the upper upper and lower lobes bilaterally  and the right middle lobe. 1.9 x 1.9 cm solid pulmonary nodule in the  left upper lobe. No pleural fluid or thickening is present. No  pneumothorax.    ABDOMEN:    Liver: Limited without IV contrast. No focal hepatic lesions.    Gallbladder and bile ducts: 7 mm gallstone. Mild gallbladder wall  thickening measuring up to 7 mm (series 10 image 281). No biliary  ductal dilation.    Spleen: Unremarkable.    Pancreas: Unremarkable.     Adrenals: No nodules.     Kidneys and ureters: No solid lesions. No calculi. No  hydroureteronephrosis.    Bladder: Decompressed  with a Garcia catheter in place.    Bowel: No abnormally dilated loops of bowel. Mild colonic  diverticulosis without evidence of diverticulitis.    Appendix: Normal.    Mesentery/Peritoneum: Mild hazy opacification of the upper central  mesenteric fat/retroperitoneum. Trace ascites.    Nodes: Scattered small abdominal nodes, none meeting CT criteria for  pathologic enlargement.    Pelvis: The prostate is enlarged, a nonspecific finding but one that  is typically secondary to BPH.    Bone windows: Sclerosis of the left iliac bone series 6 image 181. No  definite rib fractures.    Vasculature: Atherosclerotic disease of the abdominal aorta. No  abdominal aortic aneurysm.    Soft tissues: Unremarkable.          Impression    IMPRESSION:   1. Bilateral pulmonary consolidations, favoring aspiration/edema in  the setting of cardiac arrest. Superimposed infection possible.  2. Solid pulmonary nodule in the left upper lobe measuring 1.9 cm. At  a clinically appropriate time, recommend CT, PET CT, or tissue  sampling at three months per Fleischner criteria. In this older CT  chest is available, this should be obtained to see if this is stable.  3. Incidental variant left superior vena cava.  Note internal jugular,  subclavian, and PICC lines will follow an atypical course.   4. Cholelithiasis with mild gallbladder wall thickening.   5. The superior marker of the intra-aortic balloon pump is situated in  the proximal left subclavian artery.  6. ECMO cannula tip in the low right atrium.    I have personally reviewed the examination and initial interpretation  and I agree with the findings.    SHAKILA CHANDLER MD         SYSTEM ID:  [id number]   XR Chest Port 1 View    Narrative     Examination:  XR CHEST PORT 1 VIEW     Date:  10/17/2021 2:57 PM      Clinical Information: Check endotracheal tube placement and ECLS  cannula placement. DO NOT log-roll patient.  Place film under patient  using patient safety handling process.      Additional Information: none    Comparison: CT chest 10/17/2021    Findings:   Inferior approach ECMO cannula tip in the right atrium. Temperature  probe in the mid esophagus. Enteric tube coursing toward the stomach  and off the bottom of the image. Endotracheal tube tip in the the  trachea 4 cm above the son. Intra-aortic balloon pump marker 2 cm  above the son (on prior CT the tip was in the left subclavian  artery).    Left-sided superior vena cava. Perihilar groundglass opacities.  Costophrenic angles are sharp. Degenerative changes in the lower  thoracic spine.      Impression    IMPRESSION:   1. Lines and tubes as above. Note intra-aortic balloon pump marker on  prior CT was in the left subclavian artery origin.  2. Perihilar groundglass opacities. There is likely opacities in the  bilateral bases but these are not well-demonstrated on this chest  x-ray but were present on CT.    Impression:    1. .    SHAKILA CHANDLER MD         SYSTEM ID:  [id number]   XR Chest Port 1 View    Narrative    EXAM: XR CHEST PORT 1 VIEW  10/17/2021 4:18 PM     HISTORY:  IABP malpositioning, s/p repositioning       COMPARISON:  CT 10/17/2021, radiographs 10/17/2021    TECHNIQUE: Portable AP radiographs of the chest    FINDINGS:   Intra-aortic balloon pump superior marker is lower compared to the  recent prior, now at the son.  Endotracheal tube tip projects over  the midthoracic trachea. Esophageal temperature probe at the level of  the son. Enteric tube courses below the diaphragm and beyond the  field-of-view. Inferior approach venous ECMO cannula is stable at the  level of the right atrium.    The trachea is midline. Stable heart size. Slightly improved perihilar  opacities. No pleural effusion or pneumothorax.      Impression    IMPRESSION:   1. Interval inferior repositioning of the intra-aortic balloon pump,  superior marker is now at  the son.    2. Slightly improved perihilar groundglass opacities.  3.  Basilar opacities again not well demonstrated on radiographs but  present on the same day CT.    I have personally reviewed the examination and initial interpretation  and I agree with the findings.    SHAKILA CHANDLER MD         SYSTEM ID:  [id number]   US Lower Extremity Arterial Duplex Bilateral    Narrative    Exam: US LOWER EXTREMITY ARTERIAL DUPLEX BILATERAL, 10/17/2021 6:00 PM    Indication: Baseline to assess blood flow to Lower Extremities ([facility]  ECMO)    Comparison: None    Findings:   Grayscale, color Doppler, and spectral Doppler evaluation of the lower  extremity arterial vasculature.    Right:  ECMO cannulas are present within the right groin.  The common femoral artery, profunda femoris artery, and origin of the  SFA is obscured.  SFA upper thigh: To and fro flow, 69 cm/s.  SFA mid thigh: To and fro flow, 38 cm/s.  SFA distal thigh: 78 cm/s, monophasic  Popliteal artery: 26 cm/s, monophasic  PTA at ankle: 18 cm/s, monophasic  REINIER at ankle: No flow    Left:  Catheter is within the left groin.  The common femoral artery, profunda femoris artery, and origin of the  superficial femoral artery is obscured.  SFA upper thigh: 131 cm/s, multiphasic  SFA mid thigh: 107 cm/s, multiphasic  SFA distal thigh: 59 cm/s, multiphasic  Popliteal artery: 31 cm/s, multiphasic  PTA and ankle: 10 cm/s, multiphasic  REINIER ankle: No flow      Impression    Impression:   1. The evaluated lower extremity arterial vasculature is patent with  presence of flow with the exception of the bilateral ATAs at the level  of the ankle which demonstrates no flow.  2. ECMO cannulas within the right groin. The right superficial femoral  artery at the mid and upper thigh demonstrates to and fro flow, likely  related to the presence of the ECMO cannulas.    I have personally reviewed the examination and initial interpretation  and I agree with the findings.    SHAKILA CHANDLER MD         SYSTEM ID:  [id number]       Labs:  Recent Labs   Lab  10/18/21  0341 10/18/21  0340 10/18/21  0150 10/17/21  2359 10/17/21  2153 10/17/21  2153   PH 7.38  --  7.34* 7.33*  --  7.37   PCO2 41  --  45 44  --  36   PO2 96  --  182* 145*  --  110*   HCO3 24  --  24 24  --  21   O2PER 60  50 50 60 60   < > 60    < > = values in this interval not displayed.       Lab Results   Component Value Date    HGB 14.9 10/18/2021    PHGB <30 10/18/2021     10/18/2021    FIBR 256 10/18/2021    INR 1.59 (H) 10/18/2021    PTT >240 (HH) 10/18/2021    DD >20.00 (HH) 10/18/2021    ANTCH 60 (L) 10/17/2021         Plan is to continue [facility] ECMO support and adjust settings as needed. Patient will start the re-warming process some time today.      Fawad Zhou, RT  ECMO Specialist  10/18/2021 6:59 AM

## 2021-10-18 NOTE — PROGRESS NOTES
Admitted/transferred from: [facility]   Reason for admission/transfer: Arrest, [facility] ECMO  2 RN skin assessment: completed by Adrianne Rizzo.  Result of skin assessment and interventions/actions: Abrasions on chest, mottled right foot  Height, weight, drug calc weight: done  Patient belongings done  MDRO education added to care plan NO  ?

## 2021-10-18 NOTE — PROGRESS NOTES
"CLINICAL NUTRITION SERVICES - ASSESSMENT NOTE     Nutrition Prescription    RECOMMENDATIONS FOR MDs/PROVIDERS TO ORDER:  If unable to extubate and advance diet within next 48 hours, recommend initiate nutrition support (recommend EN pending GI status and hemodynamic stability).   --> If/when nutrition support becomes POC, please consult RD (\"Registered Dietitian to Order TF per Medical Nutrition Therapy Guidelines\")    Malnutrition Status:    Unable to determine due to unable to complete all parameters at this time to diagnose malnutrition (limited wt history per chart, unable to complete nutrition hx with pt intubated).      Recommendations already ordered by Registered Dietitian (RD):  None at this time.     Future/Additional Recommendations:  Once pt more hemodynamically stable and rewarmed to at least 36 degrees Celsius, rec:   Osmolite 1.5 Donald @ goal of  65ml/hr  (1560ml/day)  will provide: 2580 kcals (29 kcal/kg), 163 g PRO (1.8 g pro/kg), 1188 ml free H20, 317 g CHO, and 0 g fiber daily.  - Initiate @ 15 mL/hr and advance by 10 mL q8hr as tolerated  - Do not start or advance unless K+ >/= 3.4, Mg++ >1.5, and phos >1.9   - 30 mL q4hr fluid flushes for tube patency. Additional fluids and/or adjustments per MD.    - Multivitamin/mineral (15 mL/day via FT) to help ensure micronutrient needs being met with suspected hypermetabolic demands and potential interruptions to TF infusions.  - If gastric access: HOB >30 degrees.     If renal formula needed, rec:  Novasource Renal @ 45 mL/hr (1080 mL) + 2 pkts ProSource TID (6 pkts total)= 2400 kcal (27 kcal/kg), 164 g PRO (1.8 g/kg), 198 g CHO, and 778 mL water daily.     REASON FOR ASSESSMENT  Shin Smith is a/an 66 year old male assessed by the dietitian for Nutrition Risk Monitoring    NUTRITION HISTORY  Not previously known to Clinical Nutrition. No known allergies per chart review. Unable to obtain nutrition hx at this time d/t pt intubated, sedated and no family " "at bedside.     CURRENT NUTRITION ORDERS  Diet: NPO    LABS  Labs reviewed  -Na+ 146 (WNL)  -K+ 5.1 (WNL, previously low this admit) -- received 3 doses of 20 mEq IV KCl yesterday  -Phos 2.2 (L)  -Mg++ 2.4 (H)  -BUN 30 (WNL)  -Cr 2.04 (H)    MEDICATIONS  Medications reviewed  -Insulin gtt    ANTHROPOMETRICS  Height: 182.9 cm (6' .008\")  Most Recent Weight: 109.8 kg (242 lb 1 oz)    IBW: 83.6 kg (131% IBW)  BMI: 32.82 kg/m2; Obesity Grade I BMI 30-34.9  Weight History: No recent wt hx below or in Care Everywhere.   Wt Readings from Last 20 Encounters:   10/18/21 109.8 kg (242 lb 1 oz)   10/17/21 112.5 kg (248 lb 0.3 oz)   01/04/12 103.3 kg (227 lb 10 oz)   Dosing Weight: 90 kg (adjusted, based on lowest wt this admit of 109.8 kg on 10/18 and IBW of 83.6 kg)    ASSESSED NUTRITION NEEDS  Estimated Energy Needs: 0209-8967 kcals/day (25 - 30 kcals/kg)  Justification: Maintenance  Estimated Protein Needs: 135-180 grams protein/day (1.5 - 2 grams of pro/kg)  Justification: Hypercatabolism with critical illness  Estimated Fluid Needs: 1 mL/kcal  Justification: Maintenance or Per provider pending fluid status    PHYSICAL FINDINGS  See malnutrition section below.  -Hair, skin, nails appear WNL  -[facility] ECMO  -ETT  -OGT (not imaged)  -Cooled (34 degrees Celsius)    MALNUTRITION  % Intake: Unable to assess  % Weight Loss: Unable to assess  Subcutaneous Fat Loss: None observed  Muscle Loss: None observed  Fluid Accumulation/Edema: Does not meet criteria (trace noted, RUE noted to be swollen d/t IV infiltrate per bedside RN)  Malnutrition Diagnosis: Unable to determine due to unable to complete all parameters at this time to diagnose malnutrition (limited wt history per chart, unable to complete nutrition hx with pt intubated).      NUTRITION DIAGNOSIS  Inadequate protein-energy intake related to NPO status in setting of intubation as evidenced by pt currently meeting 0% minimum assessed needs (not accounting for kcal from " lipid/dextrose-containing medications).     INTERVENTIONS  Implementation  -Nutrition Education: Not appropriate at this time due to patient condition   -Collaboration with other providers: Discussed reason for visit with bedside RN.   -Enteral Nutrition - Recs     Goals  Begin nutrition support within 2-3 days.     Monitoring/Evaluation  Progress toward goals will be monitored and evaluated per protocol.    Aurelia Dill RD, LD  Pager: [phone]

## 2021-10-18 NOTE — PROGRESS NOTES
CRRT INITIATION NOTE    Consent for CRRT Completed:  YES  Patient s Vascular Access: Catheter              Placement Confirmed: YES      DATA:  Procedure:  CVVHD  Start Time:  1712  Machine#:  [id number]  Filter:  [id number]  Blood Flow:  200  ml/hour    Pre-Replacement Solution Rate:  1400 mL/hr  Pre-Replacement Solution:   Sagrario-sol 2/ 3.5    Dialysate Flow Rate: 1400 mL/hr  Dialysate Solution:   Sagrario-sol 2/ 3.5    Post-Replacement Solution Rate:  200 mL/hr  Post-Replacement Solution:   Sagrario-sol 2/ 3.5      Patient Removal Rate:  0 mL/hr      ASSESSMENT:  How Patient Tolerated Initiation:   Vital Signs:  Before: WNL           After: WNL    Initial Pressures:  Access:  41  Filter:  165  Effluent: 63  Return:  124  TMP:  15  Pressure Drop:  58      INTERVENTIONS:  Initiated CRRT therapy via ECMO cannula    PLAN:  Continue CRRT per Renal POC.

## 2021-10-18 NOTE — CONSULTS
Nephrology Initial Consult  October 18, 2021      Shin Smith MRN:[id number] YOB: 1955  Date of Admission:10/17/2021  Primary care provider: Lyle Denton  Requesting physician: Rich Chester MD    ASSESSMENT AND RECOMMENDATIONS:     # Non oliguric acute kidney injury, likely secondary acute tubular injury in setting of cardiogenic shock  # Hyperkalemia   # V Fib arrest with ROSC, s/p targeted temp mgmt, now undergoing rewarming     Plan:   - Start CRRT for hyperkalemia  - 2 K bath, UF goal to meet I=Os  - Strict Intake, output monitoring  - Daily weight checks  - Renally dose all medications  - Received 1 g calcium gluconate for membrane stabilization in setting of hyperkalemia.       # Cardiogenic shock, on VA ECMO, IABP and vasopressor support  # Shock liver     Recommendations were communicated to primary team via note    Seen and discussed with Dr. Ryan Amador MD   [phone]      REASON FOR CONSULT: Acute kidney with Hyperkalemia,     HISTORY OF PRESENT ILLNESS:  Admitting provider and nursing notes reviewed    Shin Smith is a 66 year old male with history of hypertension, admitted with VF arrest with ROSC. Developed cardiogenic shock and placed on VA ECMO and IABP. Underwent targeted temp management. Completed cooling and now about to start rewarming. Initially required Sodium bicarb for correction of acidosis. Baseline serum creatinine around 1. Serum creatinine slowly up treding and at 2.31. UOP - 1.4 L since midnight and 800 ml since AM. Potassium levels were initially low, not at 5.7 even before rewarming.       PAST MEDICAL HISTORY:  Reviewed with patient on 10/18/2021     Past Medical History:   Diagnosis Date     Hemorrhoids      Hypertension        Past Surgical History:   Procedure Laterality Date     COLONOSCOPY  1/4/2012    Procedure:COLONOSCOPY; Surgeon:SHERLYN ORLANDO; Location:[facility]     HEMORRHOIDECTOMY INTERNAL  1/4/2012    Procedure:HEMORRHOIDECTOMY  INTERNAL; INTRAOPERATIVE COLONOSCOPY, HEMORRHOIDECTOMY, PROCTOPLASTY (POSSIBLE LIGASURE); Surgeon:SHERLYN ORLANDO; Location:[facility]     NO HISTORY OF SURGERY       PROCTOPLASTY  1/4/2012    Procedure:PROCTOPLASTY; Surgeon:SHERLYN ORLANDO; Location:[facility]        MEDICATIONS:  PTA Meds  Prior to Admission medications    Medication Sig Last Dose Taking? Auth Provider   atenolol (TENORMIN) 50 MG tablet Take 50 mg by mouth daily.   Reported, Patient   hydrocodone-acetaminophen (VICODIN) 5-500 MG per tablet Take 1-2 tablets by mouth every 6 hours as needed for pain.   Verito Limon PA-C   LIDOCAINE    Reported, Patient   Meperidine HCl (DEMEROL PO) Take  by mouth.   Reported, Patient   Psyllium (METAMUCIL PO) Take  by mouth.   Reported, Patient      Current Meds    acetaminophen  650 mg Oral or Feeding Tube Q4H     aspirin  81 mg Per Feeding Tube Daily     chlorhexidine  15 mL Swish & Spit BID     pantoprazole (PROTONIX) IV  40 mg Intravenous Daily     piperacillin-tazobactam  3.375 g Intravenous Q6H     Infusion Meds    amiodarone 0.5 mg/min (10/18/21 1600)     dextrose       fentaNYL 100 mcg/hr (10/18/21 1600)     HEParin 800 Units/hr (10/18/21 1500)     heparin (PRESSURE BAG) 2 unit/mL in 0.9% NaCl 6 Units/hr (10/18/21 1600)     insulin regular Stopped (10/18/21 1400)     midazolam 4 mg/hr (10/18/21 1600)     norepinephrine 0.03 mcg/kg/min (10/18/21 1500)       ALLERGIES:    No Known Allergies    REVIEW OF SYSTEMS:  Unable to obtain, patient intubated and sedated    SOCIAL HISTORY:   Social History     Socioeconomic History     Marital status:      Spouse name: Not on file     Number of children: Not on file     Years of education: Not on file     Highest education level: Not on file   Occupational History     Not on file   Tobacco Use     Smoking status: Never Smoker   Substance and Sexual Activity     Alcohol use: No     Drug use: No     Sexual activity: Not on file   Other Topics Concern     Parent/sibling  "w/ CABG, MI or angioplasty before 65F 55M? Not Asked   Social History Narrative     Not on file     Social Determinants of Health     Financial Resource Strain:      Difficulty of Paying Living Expenses:    Food Insecurity:      Worried About Running Out of Food in the Last Year:      Ran Out of Food in the Last Year:    Transportation Needs:      Lack of Transportation (Medical):      Lack of Transportation (Non-Medical):    Physical Activity:      Days of Exercise per Week:      Minutes of Exercise per Session:    Stress:      Feeling of Stress :    Social Connections:      Frequency of Communication with Friends and Family:      Frequency of Social Gatherings with Friends and Family:      Attends [employer] Services:      Active Member of Clubs or Organizations:      Attends Club or Organization Meetings:      Marital Status:    Intimate Partner Violence:      Fear of Current or Ex-Partner:      Emotionally Abused:      Physically Abused:      Sexually Abused:        FAMILY MEDICAL HISTORY:   No pertinent family history    PHYSICAL EXAM:   Temp  Av.5  F (33.6  C)  Min: 87.3  F (30.7  C)  Max: 93.7  F (34.3  C)  Arterial Line BP  Min: 7/7  Max: 163/60  Arterial Line MAP (mmHg)  Av.5 mmHg  Min: 6 mmHg  Max: 140 mmHg      Pulse  Av  Min: 44  Max: 90 Resp  Av  Min: 14  Max: 30  FiO2 (%)  Av.8 %  Min: 50 %  Max: 100 %  SpO2  Av.2 %  Min: 94 %  Max: 100 %       Pulse 67   Temp (!) 93.7  F (34.3  C) (Esophageal)   Resp 16   Ht 1.829 m (6' 0.01\")   Wt 109.8 kg (242 lb 1 oz)   SpO2 99%   BMI 32.82 kg/m     Date 10/18/21 0700 - 10/19/21 0659   Shift 8802-9553 5529-3091 7239-0752 24 Hour Total   INTAKE   I.V. 1028.41 47.37  1075.78   NG/GT 60 60  120   Shift Total(mL/kg) 1088.41(9.91) 107.37(0.98)  1195.78(10.89)   OUTPUT   Urine 280 16  296   Emesis/NG output 0   0   Shift Total(mL/kg) 280(2.55) 16(0.15)  296(2.7)   Weight (kg) 109.8 109.8 109.8 109.8      Admit Weight: 109.8 kg (242 lb 1 " oz)     GENERAL APPEARANCE: intubated and sedated  EYES: no scleral icterus, pupils equal  Endo: no goiter, no moon facies  Lymphatics: no cervical or supraclavicular LAD  Pulmonary: lungs clear to auscultation with equal breath sounds bilaterally, no clubbing  CV: regular rhythm, normal rate, no rub   - JVD not distended   - Edema absent  GI: soft, nontender, normal bowel sounds  MS: no evidence of inflammation in joints, no muscle tenderness  :  bro  SKIN: no rash, warm, dry, no cyanosis     LABS:   CMP  Recent Labs   Lab 10/18/21  1403 10/18/21  1400 10/18/21  1202 10/18/21  1156 10/18/21  0956 10/18/21  0951 10/18/21  0341 10/18/21  0340 10/17/21  2159 10/17/21  2154 10/17/21  1623 10/17/21  1612   NA  --   --   --   --   --  146*  --  147*  --  148*  --  144   POTASSIUM  --  5.7*  --  5.2  --  5.1  --  3.8   < > 3.0*   < > 2.6*   CHLORIDE  --   --   --   --   --  115*  --  116*  --  116*  --  110*   CO2  --   --   --   --   --  25  --  25  --  21  --  14*   ANIONGAP  --   --   --   --   --  6  --  6  --  11  --  20*   *  --  113*  --  141* 146*   < > 135*   < > 232*   < > 401*   BUN  --   --   --   --   --  34*  --  30  --  28  --  26   CR  --   --   --   --   --  2.31*  --  2.04*  --  2.01*  --  1.70*   GFRESTIMATED  --   --   --   --   --  29*  --  33*  --  34*  --  42*   JEANNE  --   --   --   --   --  7.9*  --  7.7*  --  7.7*  --  7.1*   MAG  --   --   --   --   --  2.3  --  2.4*  --  1.7  --  2.0   PHOS  --   --   --  5.3*  --   --   --  2.2*  --   --   --   --    PROTTOTAL  --   --   --   --   --  5.5*  --  5.6*  --  5.6*  --  5.4*   ALBUMIN  --   --   --   --   --  2.6*  --  2.8*  --  2.6*  --  2.6*   BILITOTAL  --   --   --   --   --  0.8  --  0.7  --  0.7  --  0.6   ALKPHOS  --   --   --   --   --  59  --  61  --  64  --  70   AST  --   --   --   --   --  866*  --  984*  --  1,058*  --  1,091*   ALT  --   --   --   --   --  466*  --  482*  --  520*  --  542*    < > = values in this interval not  displayed.     CBC  Recent Labs   Lab 10/18/21  0951 10/18/21  0340 10/17/21  2154 10/17/21  1612   HGB 14.2 14.9 14.4 14.4   WBC 24.3* 24.0* 24.5* 28.9*   RBC 4.60 4.80 4.71 4.75   HCT 40.0 41.9 41.0 42.3   MCV 87 87 87 89   MCH 30.9 31.0 30.6 30.3   MCHC 35.5 35.6 35.1 34.0   RDW 13.1 12.9 12.5 12.6    157 158 224     INR  Recent Labs   Lab 10/18/21  0951 10/18/21  0340 10/17/21  2154 10/17/21  1612   INR 1.57* 1.59* 1.67* 1.89*   * >240* 107* 34     ABG  Recent Labs   Lab 10/18/21  1400 10/18/21  1157 10/18/21  0950 10/18/21  0807   PH 7.42 7.35 7.40 7.39   PCO2 42 51* 41 40   PO2 87 78* 66* 72*   HCO3 27 28 26 25   O2PER 50 50 50 50      URINE STUDIES  Recent Labs   Lab Test 10/17/21  1405   COLOR Light Yellow   APPEARANCE Slightly Cloudy*   URINEGLC 500 *   URINEBILI Negative   URINEKETONE 10 *   SG 1.022   UBLD Large*   URINEPH 5.5   PROTEIN 50 *   NITRITE Negative   LEUKEST Negative   RBCU 18*   WBCU 23*     No lab results found.  PTH  No lab results found.  IRON STUDIES  No lab results found.    IMAGING:  All imaging studies reviewed by me.     Blair Amador MD       no

## 2021-10-18 NOTE — CONSULTS
"[facility]  WO Nurse Inpatient Adult Pressure Injury Prevention Assessment: ECMO  Initial     Positioning Tolerance: Good  Date of ECMO cannulation: 10/17/2021  Presence of Ischemia: No  Location of ischemia: Cold, potential for ischemic on right foot, cool to touch with maroon color    Pressure Injury Prevention Interventions In Place:  Optifoam Dressing under ECMO Cannula, Z flow Positioner under head, TAPs Wedge Positioners in use, Heel off-loading boots and Mepilex Sacral Dressing   Current support surface: Standard  Low air loss mattress       Pressure Injury Prevention Interventions Added:  None        Plan of Care for Positioning and Pressure Injury Prevention  Reposition patient every 1-2 hours using TAP Wedges  Position head on Z flow positioner, mold indentation at areas of pressure points.  Pad ECMO IJ cannula with Optifoam (#[id number]) along face and scalp between skin and cannula and under Coban head wraps    Pad ECMO groin and chest cannula under rigid connectors with Optifoam or Soft cloth  Heel off-loading Boots at all times  Sacral Mepilex for Prevention, change every 5 days and prn  Low Air loss mattress    Patient History:   According to medical record: Shin Smith is a 66 year old male who was admitted on (Not on file) with VF arrest with ROSC.  The patient was cannulated at [facility] for ongoing hemodynamic instability in the setting of significant bradycardia (appeared junctional escape) and recurrent VF. The patient has no known PMH, but has not seen a doctor for a period of years.  He does not take any daily medications.     The patient's history is provided by his wife.  She awoke at approximately 8:30am and noted that her  was \"breathing funny.\"  He then began to snore.  She attempted to rouse him so as to encourage him to stop snoring.  He would not rouse.  She also then noted that the patient had urinated.  She \"flipped him " "over\" and could not get him to respond.  She called 911 and began CPR.  She brought him to the floor.  EMS arrived shortly thereafter at which point they began CPR.  He was reportedly found to be in VF.  He received 3 shocks, 450mg of amiodarone and 3mg of epinephrine. ROSC was achieved.  The patient was transported to [facility].  In the ED, the patient again developed VF.  He received 4 additional shocks. His initial lactate was 9.  His initial gas was 7.04/?/78/9. After shocks, patient was found to be in hemodynamically unstable junctional escape (HR approximately 20 bpm).  As such, he was cannulated for VA ECMO.    Current Diet / Nutrition:     Orders Placed This Encounter      NPO for Medical/Clinical Reasons Except for: No Exceptions      Output:    I/O last 3 completed shifts:  In: 2212.65 [I.V.:1792.65]  Out: 1935 [Urine:1835; Emesis/NG output:100]  Containment: of urine/stool: Urinary Catheter    Risk Assessment:   Sensory Perception: 1-->completely limited  Moisture: 4-->rarely moist  Activity: 1-->bedfast  Mobility: 1-->completely immobile  Nutrition: 2-->probably inadequate  Friction and Shear: 1-->problem  Rogers Score: 10    Labs:  Recent Labs   Lab 10/18/21  0951 10/18/21  0340 10/18/21  0340 10/17/21  1612 10/17/21  1407   ALBUMIN 2.6*   < > 2.8*   < >  --    HGB 14.2   < > 14.9   < > 13.1*   INR 1.57*   < > 1.59*   < >  --    WBC 24.3*   < > 24.0*   < > 24.3*   A1C  --   --   --   --  5.5   CRP  --   --  53.0*   < >  --     < > = values in this interval not displayed.       Focused Assessment: right Right groin ECMO cannula    Pressure Injury Present::No    Education provided to: nurse- RN aware of all preventive measures  Discussed importance of:repositioning every 2 hours, off-loading pressure to wound, wearing off-loading boots, their role in pressure injury prevention, dressing change frequency, head elevation <30 degrees, off-loading mattress, nutrition on wound healing and moisture " management  Discussed plan of care with Nurse  WOC Nurse follow-up plan:weekly    Mckayla Flores RN

## 2021-10-18 NOTE — PROGRESS NOTES
[facility]  Cardiology ICU Progress Note    Plan for Today:    - Start rewarming at 2pm today  - Wean versed  - Add Buspar, magnesium for shivering; okay to start cis if necessary  - Continue current ecmo flows  - Monitor UOP, potassium with re-warming. Consented for dialysis if necessary  - Discontinue vancomycin  - Right foot cool, monitor NIRS, trend CK level, warm compress    Assessment and Plan:    Neurology: # Concern for anoxic brain injury  - Initial head CT negative    - Intubated, sedated. Cooled to 34 degrees. Start rewarming this afternoon  - Continue versed, fentanyl for sedation with a RASS goal -4 to -5   - Cis as needed to maintain paralysis if shivering  - Neuro-crit consulted and appreciate recommendations.   - vEEG   - Palliative care consulted.   - Head CT ----Will repeat on day 3.      Cardiovascular / Hemodynamics: # Refractory VF Cardiac arrest requiring VA ECMO  # NICM - EF 5-10%  Peripheral V-A ECMO inserted via RCFA and RCFV  TTE: Severe LV Dysfunction, LVEF 5-10%  Coronary Angiogram: No coronary artery disease  ECMO cares:   Flows 3.5-4 LPM   Sweep 3 LPM   ACT goal 180-200              Plan to increase flows if blood pressure or oxygenation             drops with rewarming  - IABP, EKG triggered, 1:1, 100% augmentation  - Pulsatility 5-10 mmHg with IABP paused  - Echo with turndown tomorrow.  - Amiodarone gtt at 0.5mg/min  - Continue ASA 81mg  - Hold lipitor for now given shock liver  - Hold ACE/ARB for now given likely reduced renal fxn after arrest  - Holding beta blocker given shock  - Lipid panel    - Troponin continues to be >200  - Monitor NIRS, CK levels, warm compression to the R foot.      Pulmonary: # Acute hypoxemic respiratory failure requiring intubation  # Probable aspiration pneumonia  # Hx of tobacco use ??- consider NRP when appropriate  Vent Settings: CMV/16/500/8/50%   AB.38/41/85/24  ETT in appropriate position .    CXR:   - Wean vent as able  - Daily CXR  - Q2h ABGs   - Consider scheduled duonebs if signs of lung dz, currently PRN     GI and Nutrition: # Shock liver 2/2 cardiac arrest  No known medical hx.   - Monitor BID LFTs  - NPO while cooled - Nutrition consult pending feeding tube placement once he is warmed   - Bowel regimen - on hold for now due to hypothermia    #GI Prophylaxis: PPI; Protonix 40 mg daily   Renal, Fluid and Electrolytes: # Acute Renal Injury, likely 2/2  ATN  # Hypernatremia  # Hypoalbuminemia  # Lactic acidosis- improving  - Cr 2.04  - Monitor urine output, potassium  - Consented for dialysis if necessary  - Appreciate renal support   Infectious Disease: # Aspiration Pneumonia- in the setting of VF arrest.  CXR as above.   # Leukocytosis  Leukocytosis c/w arrest.   - Monitor for signs of infection given cooling, lines, and leukocytosis  - Daily blood cultures.   Cultures thus far: Sputum Cx with GPC, MRSA nares negative     Antibiotics               - Vancomycin 10/17 - 10/18                - Zosyn 10/17 - present    Hematology and Oncology: # Anemia of critical illness  Hgb stable. No e/o bleeding.  - Receiving heparin for ECMO with ACT goal 180-200   - PRBC for hbg < 8  - platelet for plt < 50  - FFP for INR > 2  - Cryoprecipitate for fibrinogen < 150  - US LE w/ arterial duplex reveal monophasic flow with obstructed REINIER bilaterally  - Carotid artery US with patent vessels. per ECMO protocol   - DVT PPX: Heparin as above     Endocrinology: # Hyperglycemia   - No known medical history.   - insulin gtt on currently  - Hgb a1c   Lines: R femoral arterial and venous ECMO cannulae October 17, 2021  L femoral arterial line October 17, 2021  R radial arterial line October 17, 2021  ETT October 17, 2021  Garcia catheter October 17, 2021  OG tube October 17, 2021  Current lines are required for patient management       Family update by me today: Yes      Code Status: FULL    Patient seen and discussed with  "staff physician, Dr. James Monterroso MD   Cardiology Fellow  [phone]    Interval Events:  SHIELA overnight.     Objective:  Most recent vital signs:  Pulse 76   Temp (!) 93.2  F (34  C) (Esophageal)   Resp 16   Ht 1.829 m (6' 0.01\")   Wt 109.8 kg (242 lb 1 oz)   SpO2 97%   BMI 32.82 kg/m    Temp:  [87.3  F (30.7  C)-93.2  F (34  C)] 93.2  F (34  C)  Pulse:  [44-90] 76  Resp:  [15-30] 16  BP: ()/() 113/84  MAP:  [6 mmHg-140 mmHg] 69 mmHg  Arterial Line BP: (7-163)/(3-133) 106/41  FiO2 (%):  [50 %-100 %] 50 %  SpO2:  [94 %-100 %] 97 %  Wt Readings from Last 2 Encounters:   10/18/21 109.8 kg (242 lb 1 oz)   10/17/21 112.5 kg (248 lb 0.3 oz)       Intake/Output Summary (Last 24 hours) at 10/18/2021 0953  Last data filed at 10/18/2021 0900  Gross per 24 hour   Intake 2481.98 ml   Output 2040 ml   Net 441.98 ml     Physical exam:  General: In bed, intubated, sedated  HEENT: no scleral icterus or injection  CARDIAC: RRR, no m/r/g appreciated. Balloon pump ascultated  RESP: Mechanical breath sounds, no wheezes, rhonchi or crackles appreciated anteriorly  GI: NABS, NT/ND, no guarding or rebound  : Garcia in place   EXTREMITIES: NO LE edema, cool extremities. Dusky right foot. Pulses not palpable bilaterally.  Femoral access site w/o bleeding, dressing c/d/i. No bruits appreciated.   SKIN: No acute lesions appreciated  NEURO: Intubated, sedated      Labs (Past three days):  CBC  Recent Labs   Lab 10/18/21  0340 10/17/21  2154 10/17/21  1612 10/17/21  1407   WBC 24.0* 24.5* 28.9* 24.3*   RBC 4.80 4.71 4.75 4.30*   HGB 14.9 14.4 14.4 13.1*   HCT 41.9 41.0 42.3 38.1*   MCV 87 87 89 89   MCH 31.0 30.6 30.3 30.5   MCHC 35.6 35.1 34.0 34.4   RDW 12.9 12.5 12.6 12.6    158 224 208     BMP  Recent Labs   Lab 10/18/21  0802 10/18/21  0553 10/18/21  0349 10/18/21  0341 10/18/21  0340 10/17/21  2159 10/17/21  2154 10/17/21  2153 10/17/21  2003 10/17/21  1958 10/17/21  1804 10/17/21  1715 " 10/17/21  1623 10/17/21  1612 10/17/21  1505 10/17/21  1353   NA  --   --   --   --  147*  --  148*  --   --   --   --   --   --  144  --  143   POTASSIUM  --   --   --   --  3.8  --  3.0*  --   --  2.9*  --  2.6*   < > 2.6*   < > 2.7*   CHLORIDE  --   --   --   --  116*  --  116*  --   --   --   --   --   --  110*  --  110*   CO2  --   --   --   --  25  --  21  --   --   --   --   --   --  14*  --  18*   ANIONGAP  --   --   --   --  6  --  11  --   --   --   --   --   --  20*  --  15*   * 128* 129* 135* 135*   < > 232*   < >   < >  --    < >  --    < > 401*   < > 397*   BUN  --   --   --   --  30  --  28  --   --   --   --   --   --  26  --  22   CR  --   --   --   --  2.04*  --  2.01*  --   --   --   --   --   --  1.70*  --  1.56*   GFRESTIMATED  --   --   --   --  33*  --  34*  --   --   --   --   --   --  42*  --  46*   JEANNE  --   --   --   --  7.7*  --  7.7*  --   --   --   --   --   --  7.1*  --  6.3*   MAG  --   --   --   --  2.4*  --  1.7  --   --   --   --   --   --  2.0  --  2.0   PHOS  --   --   --   --  2.2*  --   --   --   --   --   --   --   --   --   --   --     < > = values in this interval not displayed.     Troponins:     INR  Recent Labs   Lab 10/18/21  0340 10/17/21  2154 10/17/21  1612 10/17/21  1352   INR 1.59* 1.67* 1.89* 2.06*     Liver panel  Recent Labs   Lab 10/18/21  0340 10/17/21  2154 10/17/21  1612 10/17/21  1353   PROTTOTAL 5.6* 5.6* 5.4* 4.6*   ALBUMIN 2.8* 2.6* 2.6* 2.2*   BILITOTAL 0.7 0.7 0.6 0.4   ALKPHOS 61 64 70 66   * 1,058* 1,091* 829*   * 520* 542* 462*       Imaging/procedure results:  Transthoracic Echocardiogram  Interpretation Summary  On VA ECMO at 3.6LPM +IABP.  The visual ejection fraction is 5-10%.  Global right ventricular function is moderately reduced.  The inferior vena cava is normal.  No pericardial effusion is present.

## 2021-10-18 NOTE — PROGRESS NOTES
EEG CLINICAL NEUROPHYSIOLOGY PRELIMINARY REPORT    EEG through 6 AM reviewed.  Hypothermic.  Midazolam 4 mg/h, fentanyl 50mcg/h.  Severe attenuation of electrocerebral activity.  No clear electrocerebral activity, even at high sensitivities, for long stretches of the recording.  Following approximately 3 AM, rare brief runs of 5 to 8  V activity that do not have obvious artifactual source seen.  These indicate a sporadic probable electrocerebral activity.  Epileptiform discharges were seizures not seen at any point.    Severely abnormal.  There is severe suppression of electrocerebral activity with no clear electrocerebral activity, even at high sensitivities, for long periods of the recording.  Findings may in part be related to hypothermia.  Sedative drips at doses employed are probably not contributing.  Epileptiform discharges or seizures not seen at any point and there is no indication of nonconvulsive status epilepticus.  However, hypothermia has potent anticonvulsant effects and seizures could potentially emerge as this treatment modality is withdrawn.    Full report to follow.    Han Aguilar MD  Contact information for physicians covering Epilepsy and EEG is available on [facility].  Click search, enter neurology adult/[facility] in group name box, click on neurology adult/[facility], then click Staff Epilepsy and EEG.

## 2021-10-18 NOTE — PROGRESS NOTES
ECMO Attending Progress Note  10/18/2021    Shin Smith is a 66 year old male who was cannulated for ECMO 10/17 due to refractory Vfib arrest (no cad)    Cannulation Site:  17 Fr in the R femoral artery  25 Fr in the R femoral vein  8F R sfa  IABP in place 1:1    Interval events: cooled, will rewarm today at 2p, sedated for shivering,  Clot in oxygenator, no products given    Physical Exam:  Temp:  [90.7  F (32.6  C)-93.7  F (34.3  C)] 93.7  F (34.3  C)  Pulse:  [66-90] 67  Resp:  [16] 16  MAP:  [59 mmHg-93 mmHg] 68 mmHg  Arterial Line BP: ()/(35-60) 102/41  FiO2 (%):  [50 %-100 %] 50 %  SpO2:  [97 %-100 %] 100 %    Intake/Output Summary (Last 24 hours) at 10/18/2021 1556  Last data filed at 10/18/2021 1500  Gross per 24 hour   Intake 3176.43 ml   Output 1716 ml   Net 1460.43 ml    Ventilation Mode: CMV/AC  (Continuous Mandatory Ventilation/ Assist Control)  FiO2 (%): 50 %  Rate Set (breaths/minute): 16 breaths/min  Tidal Volume Set (mL): 500 mL  PEEP (cm H2O): 8 cmH2O  Oxygen Concentration (%): 50 %  Resp: 16       Labs:  Recent Labs   Lab 10/18/21  1400 10/18/21  1157 10/18/21  0950 10/18/21  0807   PH 7.42 7.35 7.40 7.39   PCO2 42 51* 41 40   PO2 87 78* 66* 72*   HCO3 27 28 26 25   O2PER 50 50 50 50      Recent Labs   Lab 10/18/21  0951 10/18/21  0340 10/17/21  2154 10/17/21  1612   WBC 24.3* 24.0* 24.5* 28.9*   HGB 14.2 14.9 14.4 14.4     Creatinine   Date Value Ref Range Status   10/18/2021 2.31 (H) 0.66 - 1.25 mg/dL Final   10/18/2021 2.04 (H) 0.66 - 1.25 mg/dL Final   10/17/2021 2.01 (H) 0.66 - 1.25 mg/dL Final   10/17/2021 1.70 (H) 0.66 - 1.25 mg/dL Final       Blood Flow (Circuit) LPM: 3.81 LPM  Gas Flow  LPM: 3 LPM  Gas FiO2   %: 70 %  ACT  (seconds): 195 seconds  Blood Temp  (degrees C): 33.9 C  Pulse Oximetry  (SpO2%): 99 %  Arterial Pressure  mmH mmHg      ECMO Issues including assessments and plan on DOS 10/18/2021:  Neuro: Sedated for mechanical ventilation and ECMO.  No acute distress.   NIRS stable R 50s L 60s b/l  RASS goal: -2  CV: Cardiogenic shock.  Hemodynamically stable off pressors this am, small dose this afternoon just prior/at time of rewarming. Not fluid responsive.  Pulm: Keep vent settings at rest settings as above.  FEN/Renal: Electrolytes stable w/ replacement protocols in place, cr 2.04 uop dropping at around 20cc/hr had been more robust in setting of cold diuresis, k high despite cold not clear source, worried about leg, called renal about starting CRRT  Heme: ACT goal: 180-200 been supratheraputic despite this he has sig fibrin in the oxygenator, Hemoglobin 12.4 stable.  Minimal oozing around the ECMO cannulas.  ID: Receiving empiric antibiotics  Cannulae: Position is acceptable on exam and the available imaging.  Distal perfusion cannula is in place and patent.  Extremities are well-perfused.     I have personally reviewed the ECMO flows, oxygenation and CO2 clearance, anticoagulation, and cannula position.  I have also personally assessed the patient's systemic response with hemodynamics, oxygenation, ventilation, and bleeding.       The patient requires continued ECMO support and management in the ICU.  I have discussed patient care and treatment plan with the primary team.      Emile Kumar MD  Critical Care Cardiology  [phone]    October 18, 2021

## 2021-10-18 NOTE — SIGNIFICANT EVENT
SPIRITUAL HEALTH SERVICES Progress Note     Pt, Armando, received [facility] anointing from on-call  on 10/17.         Calos Chin   Resident    Pager: [phone]

## 2021-10-18 NOTE — PHARMACY-ADMISSION MEDICATION HISTORY
Admission Medication History Completed by Pharmacy    See [facility] Admission Navigator for allergy information, preferred outpatient pharmacy, prior to admission medications and immunization status.     Medication History Sources:     Interview with patient's spouse, Martina    Changes made to PTA medication list (reason):    Added: Aspirin    Deleted: atenolol 50 mg daily, hydrocodone/APAP 5/500 mg 1-2 tabs q6 PRN pain, lidocaine NOS, meperidine po, psyllium po (no longer taking)    Changed: None    Additional Information:    Spouse reports patient was not taking any prescription medications and only used aspirin occasionally for pain.    Prior to Admission medications    Medication Sig Last Dose Taking? Auth Provider   aspirin (ASA) 325 MG EC tablet Take 325 mg by mouth every 6 hours as needed for pain  Yes Unknown, Entered By History       Date completed: 10/18/21    Medication history completed by: Oneyda Ro RPH

## 2021-10-18 NOTE — PLAN OF CARE
Major Shift Events    Neuro: Pupils equal round and brisk at 3mm, no response to painful stimuli, no cough, sedated on Versed gtt @ 4, Fentanyl gtt @ 100, EEG remains on.    CV: [facility]-ECMO, flows 3.8-4.0, sweep 3, FiO2 70%, 3650 RPM. Sinus rhythm with LBBB 65-75, MAPs > 65 with Levo gtt @ 0.05, Amio gtt @0.5. IABP 1:1 with 100% augmentation. Re-warming initiated at 1430.   Product: 500 LR    Resp: VC-AC, 50%, , RR 16, PEEP 8. Lung sounds clear over diminished, scant white thick secretions via oral and ET tube.    GI: OG to LIS, 200 mL of bile output, unable to assess bowel sounds, no BM.    /Renal: UOP jordon cloudy at 25-30 mL/hr. Potassium of 6.0, shifted x 1, Insulin gtt off, D10 initiated per protocol for 4 hours post-shifting. CRRT initiated @ 1715 with a 2K bath. Goal of I=O.    Plan: Continue to re-warm and trend Potassium levels Q4 hours. Levophed gtt as needed, goal of I=O via CRRT. Family updated at bedside.    For vital signs and complete assessments, please see documentation flowsheets.

## 2021-10-18 NOTE — CONSULTS
"[facility]  Palliative Care Consultation Note    Patient: Shin Smith  Date of Admission:  10/17/2021    Requesting Clinician / Team: CSI  Reason for consult: Goals of care  Patient and family support    Recommendations/discussion:  Patient seen and examined.  Interviewed family.  Patient spouse (Allyson) is struggling with the suddenness and significant change in Shin's health status. Lots of questions as to what led to his arrest in the setting of normal coronary arteries.  She has some family support in the area.  Goals at present time are restorative noting that he has not been in the hospital overnight for any purpose in the 41 years of their marriage.  Palliative  to see for support.  Family open to the concept of care conference in approximately 1 week as the patient is the youngest of 10 siblings and many are interested in being updated with his status.  Information regarding iPad access was discussed.  Visitor restrictions were reinforced in the setting of COVID-19.  Full code (no healthcare directives are completed.)  Patient spouse indicates that the never discussed limited treatment plans only that \"he would not want to be a vegetable\".    These recommendations have been discussed with pt family and primary team.      Thank you for the opportunity to participate in the care of this patient and family. Our team: will continue to follow.     During regular M-F work hours -- if you are not sure who specifically to contact -- please contact us by sending a text page to our team consult pager at [phone].    After regular work hours and on weekends/holidays, you can call our answering service at [phone]. Also, who's on call for us is available in [facility] [employer].       Lyle MONTANO NP ACHPN  Nurse Practitioner-  Advanced Practice Provider  [facility] Palliative Medicine Consult Service   [phone]  TT spent: 44 minutes of which 33 " "minutes were spent in direct face to face contact with patient/family. Patient teaching done regarding basic tenets and role of palliative care consultation. Greater than 50% of time spent counseling and/or coordinating care.   Assessments:  Shin Smith is a 66 year old male without known significant prior medical history who was found to have change in status in his sleep (sonorous respirations and urine incontinence followed by unresponsiveness by his wife who called EMS was found to be in VF s/p shocks X3 amio and epi with ROSC presented to [facility] with recurrent VF arrest and intermittent ROSC but cardiogenic shock cannulated emergently on [facility] ECMO --> [facility] cath lab --> no significant coronary disease. Unclear downtime.   Head CT imaging without early findings of anoxic injury.  Today, the patient was seen for PC consultation needing support 2/2 sequelae post cardiac arrest.     Prognosis, Goals, & Planning:      Functional Status just prior to hospitalization: 0 (Fully active, able to carry on all activities without restriction)      Prognosis, Goals, and/or Advance Care Planning were addressed today: Yes        Summary/Comments: There is been no conversations between patient and spouse regarding health care planning.  It was \"to be a project to work on this winter\".      Patient's decision making preferences: independently          Patient has decision-making capacity today for complex decisions: No            I have concerns about the patient/family's health literacy today: No           Patient has a completed Health Care Directive: No.       Code status: Full Code    Coping, Meaning, & Spirituality:   Mood, coping, and/or meaning in the context of serious illness were addressed today: Yes  Summary/Comments: Patient's family and his aide provide meaning making in his life.  His overall demeanor is quiet and reserved.  For leisure activity during summer months he plays golf several times per week walking " 18 holes each time.  He is an avid .  In winter months he enjoys cutting down trees and helping neighbors.    Social:     Living situation: lives with spouse in split level home in [place]. No recent history of health decline.     Key family / caregivers: spouse- Martina. He is youngest of 10 siblings. No adult children .    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    Current in-home services: none    History of Present Illness:  History gathered today from: family/loved ones, medical chart, medical team members, unit team members   Start rewarming at 2pm today. Staff updated with interview. He enjoys golf, outdoor shows and golf.     Key Palliative Symptom Data:  We are not helping to manage these symptoms currently in this patient.    Patient is on opioids: bowels not assessed today.    ROS:  Comprehensive ROS is unable to be obtained due to critically ill status.     Past Medical History:  Past Medical History:   Diagnosis Date     Hemorrhoids      Hypertension         Past Surgical History:  Past Surgical History:   Procedure Laterality Date     COLONOSCOPY  1/4/2012    Procedure:COLONOSCOPY; Surgeon:SHERLYN ORLANDO; Location:[facility]     HEMORRHOIDECTOMY INTERNAL  1/4/2012    Procedure:HEMORRHOIDECTOMY INTERNAL; INTRAOPERATIVE COLONOSCOPY, HEMORRHOIDECTOMY, PROCTOPLASTY (POSSIBLE LIGASURE); Surgeon:SHERLYN ORLANDO; Location:[facility]     NO HISTORY OF SURGERY       PROCTOPLASTY  1/4/2012    Procedure:PROCTOPLASTY; Surgeon:SHERLYN ORLANDO; Location:[facility]         Family History:  No family history on file. Youngest of 10 siblings- older brother ( 8 years to his senior) with PPM and hx of CABG.      Allergies:  No Known Allergies     Medications:  I have reviewed this patient's medication profile and medications from this hospitalization.     Physical Exam:  Vital Signs: Temp: (!) 93.2  F (34  C) Temp src: Esophageal   Pulse: 76   Resp: 16 SpO2: 99 % O2 Device: Mechanical  Ventilator    Weight: 242 lbs 1.04 oz   General: In bed, intubated, sedated. Minimally responsive.   HEENT: symmetric features. Orally intubated. OG tube in place.no scleral icterus or injection  CARDIAC: RRR, no m/r/g appreciated. Balloon pump noise dominates precordium.  RESP: Mechanical breath sounds, no wheeze anteriorly  GI: NABS/ND  : Garcia in place   EXTREMITIES: trace UE and LE edema, cool extremities. Dusky right foot. Pulses not palpable bilaterally.  Femoral access site w/o bleeding, dressing c/d/i.   SKIN: No acute lesions appreciated  NEURO: Intubated, sedated     Data reviewed:    Head CT (10/17) Impression: No acute intracranial pathology.     ROUTINE LABS (Last four results)  BMPRecent Labs   Lab 10/18/21  0802 10/18/21  0553 10/18/21  0349 10/18/21  0341 10/18/21  0340 10/17/21  2159 10/17/21  2154 10/17/21  2153 10/17/21  2003 10/17/21  1958 10/17/21  1804 10/17/21  1715 10/17/21  1623 10/17/21  1612 10/17/21  1505 10/17/21  1353   NA  --   --   --   --  147*  --  148*  --   --   --   --   --   --  144  --  143   POTASSIUM  --   --   --   --  3.8  --  3.0*  --   --  2.9*  --  2.6*   < > 2.6*   < > 2.7*   CHLORIDE  --   --   --   --  116*  --  116*  --   --   --   --   --   --  110*  --  110*   JEANNE  --   --   --   --  7.7*  --  7.7*  --   --   --   --   --   --  7.1*  --  6.3*   CO2  --   --   --   --  25  --  21  --   --   --   --   --   --  14*  --  18*   BUN  --   --   --   --  30  --  28  --   --   --   --   --   --  26  --  22   CR  --   --   --   --  2.04*  --  2.01*  --   --   --   --   --   --  1.70*  --  1.56*   * 128* 129* 135* 135*   < > 232*   < >   < >  --    < >  --    < > 401*   < > 397*    < > = values in this interval not displayed.     CBC  Recent Labs   Lab 10/18/21  0340 10/17/21  2154 10/17/21  1612 10/17/21  1407   WBC 24.0* 24.5* 28.9* 24.3*   RBC 4.80 4.71 4.75 4.30*   HGB 14.9 14.4 14.4 13.1*   HCT 41.9 41.0 42.3 38.1*   MCV 87 87 89 89   MCH 31.0 30.6 30.3 30.5    MCHC 35.6 35.1 34.0 34.4   RDW 12.9 12.5 12.6 12.6    158 224 208     INR  Recent Labs   Lab 10/18/21  0340 10/17/21  2154 10/17/21  1612 10/17/21  1352   INR 1.59* 1.67* 1.89* 2.06*

## 2021-10-18 NOTE — PLAN OF CARE
Major Shift Events:  Pt remains cooled, re-warming to begin at 1400. K, Mag, Phos replaced, unable to doppler pedal pulses (ultrasound reads no flow in [facility]INIER), Post-tibial pulses present, unchanged mottling on right foot, MD assessed at bedside. IABP tip slightly low on CXR, MD assessed, no intervention. LA decreasing, 4.2 this AM. Clots present in oxygenator. Pt in SR this AM, minimal to no pulsatility at times.    Plan: Continue to monitor and notify MD of questions or concerns.  For vital signs and complete assessments, please see documentation flowsheets.

## 2021-10-19 NOTE — PROGRESS NOTES
Nephrology  Progress note- continuous dialysis visit  10/19/2021     Mr Smith was seen once on CRRT for volume management and dialysis prescription.   Laboratory results and nurses' notes were reviewed.   No changes to management of volume, acidosis, or electrolytes.     Marsha Davis MD

## 2021-10-19 NOTE — PLAN OF CARE
CRRT STATUS NOTE    DATA:  Time:  1650  Pressures WNL:  YES  Filter Status:  WDL    Problems Reported/Alarms Noted:  None    Supplies Present:  YES    ASSESSMENT:  Patient Net Fluid Balance:  +229cc since midnight  Vital Signs:     Temp: 98.6  F (37  C) Temp src: Bladder   Pulse: 84   Resp: 10 SpO2: 100 % O2 Device: Mechanical Ventilator      Labs: K 4.7, Mg 2.5, Phos 4.7, Cr 2.64, iCa 4.6, Hgb 11.6      Goals of Therapy:  0-50 ml/hr as tolerated by hemodynamics    INTERVENTIONS:   None    PLAN:  Continue CRRT. Call resource RN at [phone] with any questions.

## 2021-10-19 NOTE — PHARMACY-CONSULT NOTE
Pharmacy Tube Feeding Consult    Medication reviewed for administration by feeding tube and for potential food/drug interactions.    Recommendation: No changes are needed at this time.     Pharmacy will continue to follow as new medications are ordered.    Parvez NietoD  CVICU and Advanced Heart Failure Pharmacist  Pager [phone]

## 2021-10-19 NOTE — CONSULTS
CARDIOTHORACIC SURGERY CONSULT NOTE  10/19/2021      Reason for Consult: ECMO Decannulation       ASSESSMENT/PLAN: Patient is a 66 year old male without significant PMHx who presented with VF arrest to [facility]. He was placed on VA ECMO on 10/17/2021. CVTS was consulted for ECMO decannulation. Turndown study today showed patient ready to be decannulated, with stable hemodynamics, oxygenation and ventilation on turndown.    - Will have surgeon find available OR, tentatively planning tomorrow afternoon with Dr. Morin  - Pre-op orders to be placed once OR scheduled  - Other cares per primary team  - Thank you for the opportunity to participate in the care of this patient.    Patient and plan discussed with attending, Dr. Kirt Morin.      Dwayne Holloway PA-C  Cardiothoracic Surgery  October 19, 2021 2:22 PM   p: [phone]       ________________________________________________________________________________________________    HPI: Patient is a 66 year old male without significant PMHx who presented with VF arrest to [facility]. He was placed on VA ECMO on 10/17/2021. CVTS was consulted for ECMO decannulation. Turndown study today showed patient ready to be decannulated, with stable hemodynamics, oxygenation and ventilation on turndown.    Patient was found down at home by wife. EMS arrived, patient in VF received 3 shocks and amio with ROSC. At [facility] patient had recurrent VF arrest with intermittent ROSC. He was emergently placed on VA ECMO on 10/17.      PMH:  Past Medical History:   Diagnosis Date     Hemorrhoids      Hypertension        PSH:  Past Surgical History:   Procedure Laterality Date     COLONOSCOPY  1/4/2012    Procedure:COLONOSCOPY; Surgeon:SHERLYN ORLANDO; Location:[facility]     HEMORRHOIDECTOMY INTERNAL  1/4/2012    Procedure:HEMORRHOIDECTOMY INTERNAL; INTRAOPERATIVE COLONOSCOPY, HEMORRHOIDECTOMY, PROCTOPLASTY (POSSIBLE LIGASURE); Surgeon:SHERLYN ORLANDO; Location:[facility]     NO HISTORY OF SURGERY       PROCTOPLASTY   1/4/2012    Procedure:PROCTOPLASTY; Surgeon:SHERLYN ORLANDO; Location:[facility]       FH:  No family history on file.    SH:  Social History     Socioeconomic History     Marital status:      Spouse name: Not on file     Number of children: Not on file     Years of education: Not on file     Highest education level: Not on file   Occupational History     Not on file   Tobacco Use     Smoking status: Never Smoker   Substance and Sexual Activity     Alcohol use: No     Drug use: No     Sexual activity: Not on file   Other Topics Concern     Parent/sibling w/ CABG, MI or angioplasty before 65F 55M? Not Asked   Social History Narrative     Not on file     Social Determinants of Health     Financial Resource Strain:      Difficulty of Paying Living Expenses:    Food Insecurity:      Worried About Running Out of Food in the Last Year:      Ran Out of Food in the Last Year:    Transportation Needs:      Lack of Transportation (Medical):      Lack of Transportation (Non-Medical):    Physical Activity:      Days of Exercise per Week:      Minutes of Exercise per Session:    Stress:      Feeling of Stress :    Social Connections:      Frequency of Communication with Friends and Family:      Frequency of Social Gatherings with Friends and Family:      Attends [employer] Services:      Active Member of Clubs or Organizations:      Attends Club or Organization Meetings:      Marital Status:    Intimate Partner Violence:      Fear of Current or Ex-Partner:      Emotionally Abused:      Physically Abused:      Sexually Abused:        Home Meds:  Medications Prior to Admission   Medication Sig Dispense Refill Last Dose     aspirin (ASA) 325 MG EC tablet Take 325 mg by mouth every 6 hours as needed for pain        Allergies:  No Known Allergies  ROS:  ROS: 10 point ROS neg other than the symptoms noted above in the HPI.    Physical Exam:  Temp:  [93.7  F (34.3  C)-98.8  F (37.1  C)] 98.6  F (37  C)  Pulse:  [66-95] 80  Resp:   [10-16] 10  MAP:  [59 mmHg-266 mmHg] 74 mmHg  Arterial Line BP: ()/() 126/37  FiO2 (%):  [50 %] 50 %  SpO2:  [87 %-100 %] 100 %  Gen: NAD, Intubated/sedated  Lungs: Mechanical breath sounds, no wheezing or rhonchi  CV: VA ECMO, RRR, S1S2 normal, no murmur. Radial pulses and DP pulses symmetric. Trace dependent edema.   Abd: Positive normal pitched bowel sounds, overall soft and non distended, nontender, no hepatosplenomegaly, no masses/guarding/rebound tenderness.   Musculoskeletal: grossly intact, strength 5/5 upper and lower extremities  Neuro: Sedated    Labs:  ABG   Recent Labs   Lab 10/19/21  1402 10/19/21  1154 10/19/21  1009 10/19/21  0756   PH 7.44 7.41 7.43 7.39   PCO2 41 45 40 45   PO2 136* 104 143* 126*   HCO3 28 28 26 27     CBC  Recent Labs   Lab 10/19/21  1010 10/19/21  0352 10/18/21  2146 10/18/21  1547   WBC 25.0* 28.7* 31.0* 29.6*   HGB 12.5* 12.9* 13.8 13.7   * 146* 169 166     BMP  Recent Labs   Lab 10/19/21  1406 10/19/21  1153 10/19/21  1010 10/19/21  1009 10/19/21  0353 10/19/21  0352 10/18/21  2153 10/18/21  2146 10/18/21  2003 10/18/21  1958   NA  --   --  140  --   --  142  --  144  --  146*   POTASSIUM  --   --  4.8  --   --  5.3  --  4.8  --  4.9   CHLORIDE  --   --  108  --   --  110*  --  112*  --  113*   CO2  --   --  26  --   --  25  --  25  --  26   BUN  --   --  30  --   --  30  --  35*  --  33*   CR  --   --  2.52*  --   --  2.41*  --  2.34*  --  2.38*   * 126* 116* 117*   < > 126*   < > 113*   < > 117*    < > = values in this interval not displayed.     LFT  Recent Labs   Lab 10/19/21  1010 10/19/21  0352 10/18/21  2146 10/18/21  1958 10/18/21  1547 10/18/21  1547   * 546* 666*  --   --  732*   * 348* 410*  --   --  411*   ALKPHOS 51 55 63  --   --  54   BILITOTAL 0.8 0.8 1.0  --   --  1.0   ALBUMIN 2.5* 2.5* 2.6* 2.6*   < > 2.6*   INR 1.73* 1.63* 1.57*  --   --  1.60*    < > = values in this interval not displayed.     PancreasNo lab results  found in last 7 days.    Imaging:  Recent Results (from the past 24 hour(s))   XR Chest Port 1 View    Narrative    EXAMINATION: XR CHEST PORT 1 VIEW, 10/19/2021 1:20 AM    INDICATION: Check endotracheal tube placement and ECLS cannula  placement. DO NOT log-roll patient.  Place film under patient using  patient safety handling process.    COMPARISON: 10/19/2021, CT chest 10/17/2021    FINDINGS: Single portable supine AP radiograph of the chest. Stable  positioning of the ET tube, esophageal temperature probe, intra-aortic  balloon pump, enteric tube, and lower approach ECMO cannulae.    Trachea is midline. The cardiomediastinal silhouette is within normal  limits. No large pleural effusion. No pneumothorax. Unchanged left  lung round pulmonary nodule. Decreased interstitial and airspace  opacities bilaterally.    Partially visualized upper abdomen is unremarkable. No acute osseous  evident mildly. Soft tissue is within normal limits.      Impression    IMPRESSION:  1. Stable support devices.  2. Decreased bilateral interstitial and airspace opacities. Stable  round left upper lobe pulmonary nodule, seen to better manage on prior  chest CT 10/17/2021    I have personally reviewed the examination and initial interpretation  and I agree with the findings.    LELIA MENDES MD         SYSTEM ID:  [id number]   Echo Limited    Narrative    [id number]  [id number]  [id number]  [id number]^RICHI^CHRISTIE^JADEN     [facility]  Echocardiography Laboratory  [address] [place] [id number]     Name: HOSEA LOYOLA  MRN: [id number]  : 1955  Study Date: 10/19/2021 11:34 AM  Age: 66 yrs  Gender: Male  Patient Location: [facility]  Reason For Study: Shock  Ordering Physician: CHRISTIE STODDARD  Referring Physician: LELIA CANTRELL  Performed By: Ariel Desai     BSA: 2.3 m2  Height: 72 in  Weight: 242 lb  HR: 77  BP: 128/36  mmHg  ______________________________________________________________________________  Procedure  Limited Portable Echo Adult. VA ECMO turndown.  ______________________________________________________________________________  Interpretation Summary  Limited TTE for VA-ECMO turndown     At baseline flow (3.8 L/min with IABP), the LVEF is 10-15%. The LVEDD cannot  be estimated. The AV opens with each beat. The RV function is low-normal  At the lowest flow (1.0 L/min without IABP), the LVEF is 15-20%. The LVEDD  cannot be estimated. The AV opens with each beat. The RV function is normal.     Compared to the TTE dated 10/18/2021, there is a modest improvement in the  LVEF at the lowest level of VA-ECMO support.  ______________________________________________________________________________  Vessels  VA-ECMO cannula seen in IVC.     Pericardium  No pericardial effusion is present.  ______________________________________________________________________________  Report approved by: Pb Guzmán 10/19/2021 12:22 PM     ______________________________________________________________________________

## 2021-10-19 NOTE — PLAN OF CARE
Major Shift Events:  Pt chugging, flows to 0.5, 900ml LR given, Pt coughing later causing chugging, Pt sedated more, chugging resolved. Pt warmed to 37C at 0900. RR lowered from 16 to 12 d/t Post ELS gas CO2 high (see labs). Pedal pulses remain absent, mottling on top of right foot. CRRT I=O. Wife updated.   Plan: Continue to monitor and notify MD of questions or concerns.  For vital signs and complete assessments, please see documentation flowsheets.

## 2021-10-19 NOTE — PROGRESS NOTES
ECMO Shift Summary ():     ECMO Equipment:  Console Serial #: [id number]  Circuit Lot #: [id number]  Oxygenator Lot #: [id number]      Patient remains on VA ECMO, all equipment is functioning and alarms are appropriately set. RPM's 3450 with flow range 3.6-3.8 L/min. Sweep gas is at 0.5 LPM and FiO2 70%. Clot & fibrin are appreciated on the post side of the oxygenator. Circuit is free of air. Cannulas are secure with a small amount of oozing from the right fem cannulation site-dressing supported this shift. Extremities are warm to touch.     Significant Shift Events:   One chugging episode that was likely related to coughing.  Vent support decreased so sweep could be increased to treat acidotic post oxy gas.     Vent settings:  CMV 10/480/+8/50%  Suctioned ETT for small thick bloody tan secretions.     Heparin is running at 900 u/hr (12 unit(s)/kg/hr), ACT range 167-171.     Urine output is minimal, on CRRT through the ECMO circuit. Blood loss was nil. Given 650 ml of LR just prior to my arrival for chugging and 250 mls of LR during this shift for chugging.          Intake/Output Summary (Last 24 hours) at 10/19/2021 0615  Last data filed at 10/19/2021 0600  Gross per 24 hour   Intake 2860.84 ml   Output 865 ml   Net 1995.84 ml       ECHO:  No results found for this or any previous visit.  No results found for this or any previous visit.      CXR:  Recent Results (from the past 24 hour(s))   Echocardiogram Complete   Result Value    LVEF  5-10%    Narrative    [id number]  [id number]  [id number]  [id number]^MI^QUIRINO^MONICA     [facility]  Echocardiography Laboratory  [address] [place] [id number]     Name: HOSEA LOYOLA  MRN: [id number]  : 1955  Study Date: 10/18/2021 07:13 AM  Age: 66 yrs  Gender: Male  Patient Location: [facility]  Reason For Study: [place]  Ordering Physician: QUIRINO STARK  Referring Physician: LELIA CANTRELL  Performed By: Milena Gonzalez,  RDCS     BSA: 2.3 m2  Height: 72 in  Weight: 242 lb  HR: 79  BP: 118/46 mmHg  ______________________________________________________________________________  Procedure  Complete Portable Echo Adult. Technically difficult study.  ______________________________________________________________________________  Interpretation Summary  On VA ECMO at 3.6LPM +IABP.  The visual ejection fraction is 5-10%.  Global right ventricular function is moderately reduced.  The inferior vena cava is normal.  No pericardial effusion is present.  ______________________________________________________________________________  Left Ventricle  On VA ECMO at 3.6LPM +IABP. The visual ejection fraction is 5-10%.     Right Ventricle  The right ventricle is normal size. Global right ventricular function is  moderately reduced.     Vessels  The inferior vena cava is normal.     Pericardium  No pericardial effusion is present.     ______________________________________________________________________________  MMode/2D Measurements & Calculations  RVDd: 3.4 cm     ______________________________________________________________________________  Report approved by: Pb Rees 10/18/2021 08:13 AM             Labs:  Recent Labs   Lab 10/19/21  0510 10/19/21  0353 10/19/21  0352 10/19/21  0155 10/18/21  2352 10/18/21  2352   PH 7.42 7.43  --  7.48*  --  7.48*   PCO2 41 40  --  34*  --  35   PO2 146* 104  --  126*  --  124*   HCO3 26 26  --  26  --  26   O2PER 50 70  50 50 50   < > 50    < > = values in this interval not displayed.       Lab Results   Component Value Date    HGB 12.9 (L) 10/19/2021    PHGB 40 (H) 10/19/2021     (L) 10/19/2021    FIBR 418 10/19/2021    INR 1.63 (H) 10/19/2021    PTT 96 (H) 10/19/2021    DD 8.25 (H) 10/19/2021    ANTCH 74 (L) 10/18/2021         Plan is to continue VA ECMO support.    Corinna Stein RT  ECMO Specialist  10/19/2021 6:15 AM

## 2021-10-19 NOTE — PROGRESS NOTES
"[facility]  Palliative Care Daily Progress Note       Recommendations & Counseling     Patient seen and examined. I briefly followed up with family.  Patient spouse (Allyson) is being informed by CSI team- plan for ECMO turn down today and possible decannulation tomorrow if stable. Off sedation as of am of 10/19. On CRRT with oliguria.   Spouse still has questions as to what led to his arrest in the setting of normal coronary arteries.  She has good family support in the area.   Goals at present time are restorative noting that he has not been in the hospital overnight for any reason in the 41 years of their marriage.  Palliative  following for support.  Spouse is open to the concept of care conference as the patient is the youngest of 10 siblings and many are interested in being updated with his status.   Full code (no healthcare directives are completed.)  Patient spouse indicates that the never discussed limited treatment plans only that \"he would not want to be a vegetable.\"      Assessments          Shin Smith is a 66 year old male without known significant prior medical history who was found to have change in his sleep (sonorous respirations and urine incontinence) followed by unresponsiveness by his wife who performed CPR and called EMS -was found to be in VF s/p shocks X3 amio and epi with ROSC presented to  [facility] with recurrent VF arrest and intermittent ROSC with underlying cardiogenic shock- cannulated emergently on VA ECMO --> [facility] cath lab --> no significant coronary disease. Unclear down time.   Head CT imaging without early findings of anoxic injury.  Today,10/19 the patient was seen for PC follow up support 2/2 sequelae post cardiac arrest    Prognosis, Goals, or Advance Care Planning was addressed today with: No.  Mood, coping, and/or meaning in the context of serious illness were addressed today: No.  Summary/Comments: see consult " note dated 10/18   Lyle MONTANO NP  Nurse Practitioner- Lead Advanced Practice Provider  [facility] Palliative Medicine Consult Service   [phone]  TT spent: 33 minutes of which 20 minutes were spent in direct face to face contact with patient/family. Greater than 50% of time spent counseling and/or coordinating care.          Interval History:     Chart review/discussion with unit or clinical team members:   Plan for ECMO turn down today and possible decannulation tomorrow if stable. Off sedation as of am of 10/19.   Key Palliative Symptoms:  We are not helping to manage these symptoms currently in this patient.           Review of Systems:     ROS was unable to be obtained 2/2 intubated and critically ill status.           Medications:     I have reviewed this patient's medication profile and medications during this hospitalization.           Physical Exam:   Vitals were reviewed  Temp: 98.8  F (37.1  C) Temp src: Bladder   Pulse: 84   Resp: 10 SpO2: 100 % O2 Device: Mechanical Ventilator   10/19 unchanged as noted from 10/18 exam below.   General: In bed, intubated, minimally responsive.  HEENT: symmetric features. Orally intubated. OG tube in place, again no scleral icterus or injection  CARDIAC: RRR, no m/r/g appreciated. Balloon pump 1:1  noise dominates precordium.  RESP: Mechanical breath sounds, no wheeze anteriorly  GI: NABS/ND  : Garcia in place- minimal urine output- on CRRT.   EXTREMITIES: trace UE and LE edema, cool extremities. Dusky right foot. Pulses not palpable bilaterally but + doppler carlos.  Femoral access site w/o bleeding, dressing c/d/i.   SKIN: No acute lesions appreciated  NEURO: Intubated, sedated             Data Reviewed:     ROUTINE LABS (Last four results)  BMPRecent Labs   Lab 10/19/21  1406 10/19/21  1153 10/19/21  1010 10/19/21  1009 10/19/21  0353 10/19/21  0352 10/18/21  2153 10/18/21  2146 10/18/21  2003 10/18/21  1958   NA  --   --  140  --   --  142  --  144  --   146*   POTASSIUM  --   --  4.8  --   --  5.3  --  4.8  --  4.9   CHLORIDE  --   --  108  --   --  110*  --  112*  --  113*   JEANNE  --   --  8.3*  --   --  8.3*  --  8.4*  --  8.2*   CO2  --   --  26  --   --  25  --  25  --  26   BUN  --   --  30  --   --  30  --  35*  --  33*   CR  --   --  2.52*  --   --  2.41*  --  2.34*  --  2.38*   * 126* 116* 117*   < > 126*   < > 113*   < > 117*    < > = values in this interval not displayed.     CBC  Recent Labs   Lab 10/19/21  1010 10/19/21  0352 10/18/21  2146 10/18/21  1547   WBC 25.0* 28.7* 31.0* 29.6*   RBC 4.00* 4.17* 4.50 4.48   HGB 12.5* 12.9* 13.8 13.7   HCT 36.4* 37.0* 39.3* 38.9*   MCV 91 89 87 87   MCH 31.3 30.9 30.7 30.6   MCHC 34.3 34.9 35.1 35.2   RDW 13.3 13.4 13.4 13.2   * 146* 169 166     INR  Recent Labs   Lab 10/19/21  1010 10/19/21  0352 10/18/21  2146 10/18/21  1547   INR 1.73* 1.63* 1.57* 1.60*

## 2021-10-19 NOTE — PLAN OF CARE
Major Shift Events     Neuro: Pupils equal round and brisk at 3mm, no response to painful stimuli, minimal cough with suctioning, sedation off at 1000, EEG remains on.     CV: VA-ECMO, flows 3.0, sweep 3, FiO2 70%, 3100 RPM. Sinus rhythm with LBBB 75-90, MAPs > 65 without pressors, Amio gtt converted to oral. IABP 1:1 with 100% augmentation. No product given.     Resp: VC-AC, 50%, , RR 10, PEEP 8. Lung sounds clear over diminished, scant white thick secretions via oral and ET tube.     GI: OG tube feedings initiated, 100 mL of green output, unable to assess bowel sounds, no BM.     /Renal: UOP jordon cloudy at 0-5 mL/hr. Insulin gtt off, CRRT goal of 0-50 mL/hr as  BP tolerates.     Plan: Potential for de-cannulation tomorrow 10/20. Family updated at bedside.     For vital signs and complete assessments, please see documentation flowsheets.

## 2021-10-19 NOTE — PROGRESS NOTES
ECMO Shift Summary:4201-2699      ECMO Equipment:  Console serial number: [id number]  Circuit Lot number: [id number]  Oxygenator Lot number: [id number]      Patient remains on VA ECMO, all equipment is functioning and alarms are appropriately set. RPM's 3600 with flow range 3.92-3.99 L/min. Sweep gas is at 1 LPM and FiO2 70%. There is a moderate amount of clot/fibrin in the oxygenator. Cannulas are secure with a small amount of oozing from the cannulation site. Extremities are warm    Significant Shift Events: Patient was started on CRRT this afternoon.  Re-warming was started @ 1400.    Vent settings:  Ventilation Mode: CMV/AC  (Continuous Mandatory Ventilation/ Assist Control)  FiO2 (%): 50 %  Rate Set (breaths/minute): 16 breaths/min  Tidal Volume Set (mL): 500 mL  PEEP (cm H2O): 8 cmH2O  Oxygen Concentration (%): 50 %  Resp: 16  .    Heparin is running at 800 u/hr, ACT range 175-195.    Urine output is approximately 28 ml/hr, blood loss was minimal. No product given.       Intake/Output Summary (Last 24 hours) at 10/18/2021 2129  Last data filed at 10/18/2021 2100  Gross per 24 hour   Intake 2483.04 ml   Output 1396 ml   Net 1087.04 ml       ECHO:  No results found for this or any previous visit.  No results found for this or any previous visit.      CXR:  Recent Results (from the past 24 hour(s))   XR Chest Port 1 View    Narrative    EXAMINATION: XR CHEST PORT 1 VIEW, 10/18/2021 1:09 AM    INDICATION: Check endotracheal tube placement and ECLS cannula  placement. DO NOT log-roll patient.  Place film under patient using  patient safety handling process.    COMPARISON: 10/17/2021    FINDINGS: Single portable supine AP radiograph of the chest. ET tube  projects over the mid thoracic trachea. Esophageal temperature probe  projects over the mid thorax. Intra-aortic balloon pump has moved  approximately 8 mm caudally when compared to prior exam. Enteric tube  side-port and tip projects over the stomach.    Trachea  is midline. The cardiomediastinal silhouette is within normal  limits. No pleural effusion. No pneumothorax. Bilateral pulmonary  opacities and nodules, unchanged.    Gasless abdomen. No acute osseous evident mildly. Soft tissue is  within normal limits.      Impression    IMPRESSION:  1. Intra-aortic balloon pump has moved 8 mm caudally when compared to  prior examination. Remainder of support devices are stable.  2. Unchanged pulmonary opacities.    I have personally reviewed the examination and initial interpretation  and I agree with the findings.    JOSIE MCGOVERN MD         SYSTEM ID:  [id number]   Echocardiogram Complete   Result Value    LVEF  5-10%    Narrative    [id number]  [id number]  [id number]  [id number]^MI^QUIRINO^MONICA     [facility]  Echocardiography Laboratory  [address] [place] [address]     Name: HOSEA LOYOLA  MRN: [id number]  : 1955  Study Date: 10/18/2021 07:13 AM  Age: 66 yrs  Gender: Male  Patient Location: [facility]  Reason For Study: MI  Ordering Physician: QUIRINO STARK  Referring Physician: LELIA CANTRELL  Performed By: Milena Gonzalez ANA ROSA     BSA: 2.3 m2  Height: 72 in  Weight: 242 lb  HR: 79  BP: 118/46 mmHg  ______________________________________________________________________________  Procedure  Complete Portable Echo Adult. Technically difficult study.  ______________________________________________________________________________  Interpretation Summary  On VA ECMO at 3.6LPM +IABP.  The visual ejection fraction is 5-10%.  Global right ventricular function is moderately reduced.  The inferior vena cava is normal.  No pericardial effusion is present.  ______________________________________________________________________________  Left Ventricle  On VA ECMO at 3.6LPM +IABP. The visual ejection fraction is 5-10%.     Right Ventricle  The right ventricle is normal size. Global right ventricular function is  moderately reduced.      Vessels  The inferior vena cava is normal.     Pericardium  No pericardial effusion is present.     ______________________________________________________________________________  MMode/2D Measurements & Calculations  RVDd: 3.4 cm     ______________________________________________________________________________  Report approved by: Pb Rees 10/18/2021 08:13 AM             Labs:  Recent Labs   Lab 10/18/21  1955/21  1740 10/18/21  1550 10/18/21  1549 10/18/21  1547 10/18/21  1400   PH 7.49* 7.47*  --  7.46*  --  7.42   PCO2 34* 34*  --  37  --  42   PO2 123* 103  --  90  --  87   HCO3 26 25  --  26  --  27   O2PER 50 50 70 50   < > 50    < > = values in this interval not displayed.       Lab Results   Component Value Date    HGB 13.7 10/18/2021    PHGB <30 10/18/2021     10/18/2021    FIBR 322 10/18/2021    INR 1.60 (H) 10/18/2021     (HH) 10/18/2021    DD 19.21 (H) 10/18/2021    ANTCH 74 (L) 10/18/2021         Plan is to continue VA ECMO support.      Srikanth Tirado RT  ECMO Specialist  10/18/2021 9:29 PM

## 2021-10-19 NOTE — PROGRESS NOTES
ECMO [place] STUDY    Inotropes/Pressors:  Norepi 0.05 mcg/kg/mn    Ventilation Mode: CMV/AC  (Continuous Mandatory Ventilation/ Assist Control)  FiO2 (%): 50 %  Rate Set (breaths/minute): 10 breaths/min  Tidal Volume Set (mL): 480 mL  PEEP (cm H2O): 8 cmH2O  Oxygen Concentration (%): 50 %  Resp: 10       Flow  BP HR O2 Sat  MVO2  IABP    3 113/34 (70) 99  73.6  1:1        2 114/36 (70) 99  74.1  1:1   1 120/37 (74)  98  74.6  Standby             ABG at lowest Flow:  7.45/45/104/28  P/F Ratio: 208         Summary:  Echo with turndown:  At baseline flow (3.8 L/min with IABP), the LVEF is 10-15%. The LVEDD cannot  be estimated. The AV opens with each beat. The RV function is low-normal  At the lowest flow (1.0 L/min without IABP), the LVEF is 15-20%. The LVEDD  cannot be estimated. The AV opens with each beat. The RV function is normal.     Compared to the TTE dated 10/18/2021, there is a modest improvement in the  LVEF at the lowest level of VA-ECMO support.    --Hemodynamically stable with turndown   --Oxygenation and ventilation stable with turndown    Plan:  --The patient is ready for decannulation.    October 19, 2021

## 2021-10-19 NOTE — PROGRESS NOTES
[facility]  Neurocritical Care Progress Note    Patient Name:  Shin Smith  MRN:  [id number]    :  1955  Date of Service:  10/19/2021  Date of Admission:  10/17/2021  Hospital Day: 3     Assessment & Plan   Shin Smith is a 66-year-old male who was admitted following Vfib arrest on VA ECMO. The neurocritical care service as asked to evaluate for post-cardiac arrest neuroprognostication. Since admission, the patient has been rewarmed and on fentanyl and midazolam gtts. Head CT was obtained on admission and showed good grey-white differentiation without significant swelling or edema.    EEG has been continuous without epileptiform discharges or seizures.     -Continuous vEEG  -Head CT without contrast ordered for 10/20, we will follow this  -Limit sedating medications as able  -Avoid hypotonic solutions as they can worsen cerebral edema  -Neurocrit will follow along    The patient was seen and discussed with the NCC attending, Dr. Atkinson.    Patricia Garay MD  Neurology PGY-3  10/19/2021 7:08 AM     Interval History/24-hour events   Nursing notes reviewed.    Rewarmed yesterday. Started on CRRT and remains on VA ECMO.    Weaned off fentanyl and midazolam gtt this morning.     Objective   Temp:  [93.2  F (34  C)-98.6  F (37  C)] 98.6  F (37  C)  Pulse:  [66-93] 85  Resp:  [16] 16  MAP:  [59 mmHg-266 mmHg] 66 mmHg  Arterial Line BP: ()/() 110/38  FiO2 (%):  [50 %] 50 %  SpO2:  [87 %-100 %] 99 %    Ventilation Mode: CMV/AC  (Continuous Mandatory Ventilation/ Assist Control)  FiO2 (%): 50 %  Rate Set (breaths/minute): (S) 10 breaths/min  Tidal Volume Set (mL): (S) 480 mL  PEEP (cm H2O): 8 cmH2O  Oxygen Concentration (%): 50 %  Resp: 16    I/O last 3 completed shifts:  In: 2960.84 [I.V.:2279.84; Other:1; NG/GT:180; IV Piggyback:500]  Out: 880 [Urine:461; Emesis/NG output:250; Other:169]    Physical Exam  General: Laying in hospital bed, no acute distress.  CV:  Extremities appear well perfused.  Resp: On mechanical ventilation. No accessory muscle use.  GI: Soft, non-distended.  Extremities: No edema. Pedal pulses palpable.  Skin: Warm, dry, no jaundice.    Neuro:  MSE: Eyes are open, but the patient is not regarding/tracking. He does not follow simple commands.   CN: Pupils are 2mm, round, reactive to light. Cough reflex intact. Unable to assess facial symmetry while the patient is intubated.  Motor/Sensory: no movement to noxious stimuli in all extremities.  Reflexes: no clonus, toes mute.  Gait: Deferred.     Labs and Imaging   BMP  Recent Labs   Lab 10/19/21  0352 10/18/21  2146 10/18/21  1958 10/18/21  1957/21  1740 10/18/21  1547    144 146*  --   --  146*   POTASSIUM 5.3 4.8 4.9 4.8   < > 6.0*   CHLORIDE 110* 112* 113*  --   --  115*   CO2 25 25 26  --   --  24   BUN 30 35* 33*  --   --  36*   CR 2.41* 2.34* 2.38*  --   --  2.57*   JEANNE 8.3* 8.4* 8.2*  --   --  8.1*    < > = values in this interval not displayed.     CBC  Recent Labs   Lab 10/19/21  0352 10/18/21  2146 10/18/21  1547 10/18/21  0951   WBC 28.7* 31.0* 29.6* 24.3*   HGB 12.9* 13.8 13.7 14.2   * 169 166 158     COAGS  Recent Labs   Lab 10/19/21  0352 10/18/21  2146 10/18/21  1547 10/18/21  0951 10/18/21  0340 10/18/21  0340 10/17/21  2154 10/17/21  1612   INR 1.63* 1.57* 1.60* 1.57*   < > 1.59*   < > 1.89*   PTT 96* 108* 167* 205*   < > >240*   < > 34   FIBR 418  --  322  --   --  256  --  156*    < > = values in this interval not displayed.     ABG  Recent Labs   Lab 10/19/21  0608 10/19/21  0510 10/19/21  0353 10/19/21  0155   PH 7.39 7.42 7.43 7.48*   PCO2 44 41 40 34*   PO2 119* 146* 104 126*   HCO3 27 26 26 26     IMAGING:   Personally reviewed. The radiologists interpretation is documented below.    Head CT without contrast 10/17/2021:  Impression: No acute intracranial pathology.

## 2021-10-19 NOTE — PROGRESS NOTES
ECMO Attending Progress Note  10/19/2021    Shin Smith is a 66 year old male who was cannulated for ECMO 10/17 due to refractory Vfib arrest (no cad)    Cannulation Site:  17 Fr in the R femoral artery  25 Fr in the R femoral vein  8F R sfa  IABP in place 1:1    Interval events: rewarmed on small amount of ne 0.06, pulsatility increasing today 35mmHg, turn down stable hemodynamics still low lv ef rv normal    Physical Exam:  Temp:  [93.2  F (34  C)-98.8  F (37.1  C)] 98.6  F (37  C)  Pulse:  [66-95] 80  Resp:  [10-16] 10  MAP:  [59 mmHg-266 mmHg] 74 mmHg  Arterial Line BP: ()/() 126/37  FiO2 (%):  [50 %] 50 %  SpO2:  [87 %-100 %] 100 %    Intake/Output Summary (Last 24 hours) at 10/18/2021 1556  Last data filed at 10/18/2021 1500  Gross per 24 hour   Intake 3176.43 ml   Output 1716 ml   Net 1460.43 ml    Ventilation Mode: CMV/AC  (Continuous Mandatory Ventilation/ Assist Control)  FiO2 (%): 50 %  Rate Set (breaths/minute): 10 breaths/min  Tidal Volume Set (mL): 480 mL  PEEP (cm H2O): 8 cmH2O  Oxygen Concentration (%): 50 %  Resp: 10       Labs:  Recent Labs   Lab 10/19/21  1154 10/19/21  1009 10/19/21  0756 10/19/21  0608   PH 7.41 7.43 7.39 7.39   PCO2 45 40 45 44   PO2 104 143* 126* 119*   HCO3 28 26 27 27   O2PER 50 50 50 50      Recent Labs   Lab 10/19/21  1010 10/19/21  0352 10/18/21  2146 10/18/21  1547   WBC 25.0* 28.7* 31.0* 29.6*   HGB 12.5* 12.9* 13.8 13.7     Creatinine   Date Value Ref Range Status   10/19/2021 2.52 (H) 0.66 - 1.25 mg/dL Final   10/19/2021 2.41 (H) 0.66 - 1.25 mg/dL Final   10/18/2021 2.34 (H) 0.66 - 1.25 mg/dL Final   10/18/2021 2.38 (H) 0.66 - 1.25 mg/dL Final       Blood Flow (Circuit) LPM: 3.81 LPM  Gas Flow  LPM: 3 LPM  Gas FiO2   %: 70 %  ACT  (seconds): 195 seconds  Blood Temp  (degrees C): 33.9 C  Pulse Oximetry  (SpO2%): 99 %  Arterial Pressure  mmH mmHg      ECMO Issues including assessments and plan on DOS 10/19/2021:  Neuro: Sedated for mechanical  ventilation and ECMO.  No acute distress.  NIRS stable R 60s L 70s b/l  RASS goal: -1  CV: Cardiogenic shock.  Small dose of ne 0.03-0.06. Not fluid responsive.  Pulm: Keep vent settings at rest settings as above.  FEN/Renal: Electrolytes stable w/ replacement protocols in place, cr 2.41 on crrt minimal uop   Heme: ACT goal: 180-200 sig fibrin in the oxygenator stable to slightly worsened, Hemoglobin 12.9 stable.  Minimal oozing around the ECMO cannulas.  ID: Receiving empiric antibiotics  Cannulae: Position is acceptable on exam and the available imaging.  Distal perfusion cannula is in place and patent.  Extremities are well-perfused.     I have personally reviewed the ECMO flows, oxygenation and CO2 clearance, anticoagulation, and cannula position.  I have also personally assessed the patient's systemic response with hemodynamics, oxygenation, ventilation, and bleeding.       The patient requires continued ECMO support and management in the ICU.  I have discussed patient care and treatment plan with the primary team.      Emile Kumar MD  Critical Care Cardiology  [phone]    October 19, 2021

## 2021-10-19 NOTE — PROGRESS NOTES
[facility]  Cardiology ICU Progress Note    Plan for Today:     - Wean Sedation  - Continue CRRT for hyperkalemia and oliguric XIOMARA  - Start TF  - Decrease flow to 3LPM  - ECMO turndown today  - CVTS consult for possible decannulation tomorrow  - Discontinue bro     Assessment and Plan:     Neurology: # Concern for anoxic brain injury  - Initial head CT negative    - Intubated, sedated. Rewarmed  - Wean sedation as tolerated  - Neuro-crit consulted and appreciate recommendations.   - vEEG   - Palliative care consulted.   - Head CT ----Will repeat on day 3 (tomorrow)   Cardiovascular / Hemodynamics: # Refractory VF Cardiac arrest requiring VA ECMO  # NICM - EF 10-20%  Peripheral V-A ECMO inserted via RCFA and RCFV  TTE: Severe LV Dysfunction, LVEF 10-20%  Coronary Angiogram: No coronary artery disease  ECMO cares:              Flows 3.5 LPM, decrease to 3 LPM              Sweep 2 LPM              ACT goal 180-200  - IABP, EKG triggered, 1:1, 100% augmentation  - Pulsatility 25-30 mmHg with IABP paused  - Echo turndown today  - Amiodarone gtt at 0.5mg/min, transition to PO amiodarone tonight  - Continue ASA 81mg  - Hold lipitor for now given shock liver  - Hold ACE/ARB for now given likely reduced renal fxn after arrest  - Holding beta blocker given shock  - US LE w/ arterial duplex reveal monophasic flow with obstructed REINIER bilaterally  - Monitor NIRS, CK levels, warm compression to the R foot.      Pulmonary: # Acute hypoxemic respiratory failure requiring intubation  # Probable aspiration pneumonia  # Hx of tobacco use ??- consider NRP when appropriate  Vent Settings: CMV/10/480/8/50%   AB.38/41/85/24  ETT in appropriate position .   CXR:   - Wean vent as able  - Daily CXR  - Q2h ABGs   - Consider scheduled duonebs if signs of lung dz, currently PRN     GI and Nutrition: # Shock liver 2/2 cardiac arrest  No known medical hx.   - Monitor BID LFTs  - Start tube  "feeds  - Bowel regimen - on hold for now due to hypothermia     #GI Prophylaxis: PPI; Protonix 40 mg daily   Renal, Fluid and Electrolytes: # Acute Renal Injury, likely 2/2  ATN  # Hypernatremia  # Hypoalbuminemia  # Lactic acidosis- improving  - On CRRT for hyperkalemia and low UOP  - Net negative 500cc to 1L today if possible  - Appreciate renal support   Infectious Disease: # Aspiration Pneumonia- in the setting of VF arrest.  CXR as above.   # Leukocytosis  Leukocytosis c/w arrest.   - Monitor for signs of infection given cooling, lines, and leukocytosis  - Daily blood cultures.   Cultures thus far: Sputum Cx with GPC, MRSA nares negative                Antibiotics               - Vancomycin 10/17 - 10/18                - Zosyn 10/17 - present    Hematology and Oncology: # Anemia of critical illness  Hgb stable. No e/o bleeding.  - Receiving heparin for ECMO with ACT goal 180-200   - PRBC for hbg < 8  - platelet for plt < 50  - FFP for INR > 2  - Cryoprecipitate for fibrinogen < 150  - Carotid artery US with patent vessels. per ECMO protocol   - DVT PPX: Heparin as above      Endocrinology: # Hyperglycemia   - Insulin gtt as needed  - Hgb a1c   Lines: R femoral arterial and venous ECMO cannulae October 17, 2021  L femoral arterial line October 17, 2021  R radial arterial line October 17, 2021  ETT October 17, 2021  Garcia catheter October 17, 2021  OG tube October 17, 2021  Current lines are required for patient management       Family update by me today: Yes      Code Status: FULL     Patient seen and discussed with staff physician, Dr. James Monterroso MD   Cardiology Fellow  [phone]    Interval Events:  Chugging overnight on the ECMO circuit, specifically when sedation was weaned. Received IVF    Objective:  Most recent vital signs:  Pulse 85   Temp 98.6  F (37  C) (Esophageal)   Resp 16   Ht 1.829 m (6' 0.01\")   Wt 109.8 kg (242 lb 1 oz)   SpO2 99%   BMI 32.82 kg/m    Temp:  [93.2  F (34 "  C)-98.6  F (37  C)] 98.6  F (37  C)  Pulse:  [66-93] 85  Resp:  [16] 16  MAP:  [59 mmHg-266 mmHg] 81 mmHg  Arterial Line BP: ()/() 127/44  FiO2 (%):  [50 %] 50 %  SpO2:  [87 %-100 %] 99 %  Wt Readings from Last 2 Encounters:   10/18/21 109.8 kg (242 lb 1 oz)   10/17/21 112.5 kg (248 lb 0.3 oz)       Intake/Output Summary (Last 24 hours) at 10/19/2021 0638  Last data filed at 10/19/2021 0600  Gross per 24 hour   Intake 2860.84 ml   Output 880 ml   Net 1980.84 ml     Physical exam:  General: In bed, intubated,  HEENT: PERRL. No scleral icterus or injection  CARDIAC: RRR, no m/r/g appreciated. Balloon pump ascultated  RESP: Mechanical breath sounds, no wheezes, rhonchi or crackles appreciated anteriorly  GI: NABS, NT/ND, no guarding or rebound  EXTREMITIES: NO LE edema, cool extremities. Dusky top and bottom of right foot. Pulses not palpable bilaterally.  Femoral access sites w/o bleeding, dressing c/d/i. No bruits appreciated.   SKIN: No acute lesions appreciated  NEURO: Intubated, sedated    Labs (Past three days):  CBC  Recent Labs   Lab 10/19/21  0352 10/18/21  2146 10/18/21  1547 10/18/21  0951   WBC 28.7* 31.0* 29.6* 24.3*   RBC 4.17* 4.50 4.48 4.60   HGB 12.9* 13.8 13.7 14.2   HCT 37.0* 39.3* 38.9* 40.0   MCV 89 87 87 87   MCH 30.9 30.7 30.6 30.9   MCHC 34.9 35.1 35.2 35.5   RDW 13.4 13.4 13.2 13.1   * 169 166 158     BMP  Recent Labs   Lab 10/19/21  0625 10/19/21  0401 10/19/21  0353 10/19/21  0352 10/18/21  2153 10/18/21  2146 10/18/21  2003 10/18/21  1958 10/18/21  1957/21  1549 10/18/21  1547 10/18/21  1547 10/18/21  1202 10/18/21  1156 10/18/21  0341 10/18/21  0340   NA  --   --   --  142  --  144  --  146*  --   --   --  146*   < >  --    < > 147*   POTASSIUM  --   --   --  5.3  --  4.8  --  4.9 4.8   < >  --  6.0*   < > 5.2   < > 3.8   CHLORIDE  --   --   --  110*  --  112*  --  113*  --   --   --  115*   < >  --    < > 116*   CO2  --   --   --  25  --  25  --  26  --   --   --   24   < >  --    < > 25   ANIONGAP  --   --   --  7  --  7  --  7  --   --   --  7   < >  --    < > 6   * 120* 131* 126*   < > 113*   < > 117*  --    < >   < > 101*   < >  --    < > 135*   BUN  --   --   --  30  --  35*  --  33*  --   --   --  36*   < >  --    < > 30   CR  --   --   --  2.41*  --  2.34*  --  2.38*  --   --   --  2.57*   < >  --    < > 2.04*   GFRESTIMATED  --   --   --  27*  --  28*  --  28*  --   --   --  25*   < >  --    < > 33*   JEANNE  --   --   --  8.3*  --  8.4*  --  8.2*  --   --   --  8.1*   < >  --    < > 7.7*   MAG  --   --   --  2.1  --  2.4*  --  2.5*  --   --   --  2.9*   < >  --    < > 2.4*   PHOS  --   --   --  5.5*  --   --   --  4.7*  --   --   --   --   --  5.3*  --  2.2*    < > = values in this interval not displayed.     Troponins:     INR  Recent Labs   Lab 10/19/21  0352 10/18/21  2146 10/18/21  1547 10/18/21  0951   INR 1.63* 1.57* 1.60* 1.57*     Liver panel  Recent Labs   Lab 10/19/21  0352 10/18/21  2146 10/18/21  1958 10/18/21  1547 10/18/21  0951 10/18/21  0951   PROTTOTAL 5.6* 5.7*  --  5.4*  --  5.5*   ALBUMIN 2.5* 2.6* 2.6* 2.6*   < > 2.6*   BILITOTAL 0.8 1.0  --  1.0  --  0.8   ALKPHOS 55 63  --  54  --  59   * 666*  --  732*  --  866*   * 410*  --  411*  --  466*    < > = values in this interval not displayed.

## 2021-10-19 NOTE — PROGRESS NOTES
EEG CLINICAL NEUROPHYSIOLOGY PRELIMINARY REPORT    Hypothermia lifted with euthermia achieved approximately 6 PM 10/18.  Continues on fentanyl 50 to 100 mcg/h and midazolam 4 mg/h.  Several hours after euthermia was achieved, 20  V diffuse theta appears.  Theta bursts become more continuous.  By early morning hours 10/19, EEG is largely continuous with attenuations occupying only about 10% of the record.  EEG remains unreactive and there are no normal rhythms.  Epileptiform discharges were seizures not seen.    Some improvement in electrocerebral activity after lifting of hypothermia.  EEG is now continuous with reasonable amplitude.  Nonetheless, background continues consistent with moderate to severe diffuse encephalopathy.  Thus far, no seizures following reversal of hypothermia.    Full report to follow.    Han Aguilar MD  Contact information for physicians covering Epilepsy and EEG is available on [facility].  Click search, enter neurology adult/[facility] in group name box, click on neurology adult/ummc, then click Staff Epilepsy and EEG.

## 2021-10-19 NOTE — PROGRESS NOTES
CLINICAL NUTRITION SERVICES - BRIEF NOTE   (See RD note on 10/18 for full assessment)     Reason for RD note: Provider order for Registered Dietitian to order TF per Medical Nutrition Therapy Guidelines.    New Findings/Chart Review:  -K+ 5.3 (high-end normal, uptrended), Mg++ 2.1 (WNL), Phos 5.5 (H), BUN 30 (WNL), Cr 2.41 (H)  -CRRT initiated yesterday.    Interventions:  Discussed nutrition POC with primary team - ok to use OG and advance to goal feeds. Will start renal formula.  -Ordered EN support via OGT:   Novasource Renal @ 45 mL/hr (1080 mL) + 2 pkts ProSource TID (6 pkts total) = 2400 kcal (27 kcal/kg), 164 g PRO (1.8 g/kg), 198 g CHO, and 778 mL water daily.  - Initiate @ 15 mL/hr and advance by 10 mL q8hr as tolerated  - Do not advance unless K+ >/= 3.4, Mg++ >1.5, and phos >1.9   - 30 mL q4hr fluid flushes for tube patency. Additional fluids and/or adjustments per MD.    - Nephronex multivitamin/mineral to help ensure micronutrient needs being met with suspected hypermetabolic demands and potential interruptions to TF infusions.  - Gastric access: HOB >30 degrees.     Future/Additional Recommendations:  Monitor tolerance and lytes with advancement to goal TF rate via OGT.    Nutrition will continue to follow per protocol.    Aurelia Dill RD, LD  Pager: [phone]

## 2021-10-20 NOTE — PROGRESS NOTES
EEG CLINICAL NEUROPHYSIOLOGY PRELIMINARY REPORT    EEG through around 11AM today reviewed.  As best as can be gathered from electronic medical record, patient is off sedative drips.  EEG is largely continuous but there are no normal rhythms.  Bifrontal theta and beta predominate.  These wax and wane.  There are occasional periods of significant attenuation.  There are occasional runs of generalized periodic discharges.  EEG is not reactive.    Study continues consistent with moderate to severe, sometimes a severe diffuse encephalopathy.  There is more indication of generalized cortical irritability and a seizure tendency off sedative drips.  No clear electrographic seizures.    Given to the emergence of evidence of generalized cortical irritability, treatment with antiseizure medications could be considered.  We will continue monitoring for emergence of seizures.  Discussed with neuro critical care.    Han Aguilar MD  Contact information for physicians covering Epilepsy and EEG is available on [facility].  Click search, enter neurology adult/ummc in group name box, click on neurology adult/ummc, then click Staff Epilepsy and EEG.

## 2021-10-20 NOTE — PROGRESS NOTES
SPIRITUAL HEALTH SERVICES  SPIRITUAL ASSESSMENT Progress Note (Palliative Focus)  [facility] 4E    REFERRAL SOURCE: Palliative care consult follow-up    Briefly met with pt, Armando, and wife Sariah at pt's bedside. Offered emotional and spiritual support. Sariah showed me a picture of the two of them hanging in the room. Sariah is asking for prayer for Armando, especially regarding his MRI which expected to be tomorrow. Offered to notify the hospital  when he returns from retreat tomorrow. Prayed with Sariah and Armando at bedside.     Plan: Will communicate to  that family would appreciate additional support. Will continue to follow while palliative consult remains open.    Calos Chin  Palliative Chaplain Resident  Pager [phone]    [facility] Palliative Care Inpatient Team Consult pager [phone] (M-F 8-4:30)  After-hours Answering Service [phone]

## 2021-10-20 NOTE — BRIEF OP NOTE
[facility]    Brief Operative Note    Pre-operative diagnosis: Cardiogenic shock (H) [R57.0]  Post-operative diagnosis Same as pre-operative diagnosis    Procedure: Procedure(s):  Decannulation of femoral arterial and venous vessels, repair of femoral artery and vein, placement of dialysis catheter  Surgeon: Surgeon(s) and Role:     * Cecilio Mi MD - Primary     * Dwayne Holloway PA-C - Assisting  Anesthesia: General   Estimated Blood Loss: Less than 50 ml    Drains: None  Specimens: * No specimens in log *  Findings:   None.  Complications: None.  Implants: * No implants in log *    S/p ECMO decannulation, direct removal of distal perfusion catheter. Dialysis line placement

## 2021-10-20 NOTE — ANESTHESIA POSTPROCEDURE EVALUATION
Patient: Shin Smith    Procedure: Procedure(s):  Decannulation of femoral arterial and venous vessels, repair of femoral artery and vein, placement of dialysis catheter       Diagnosis:Cardiogenic shock (H) [R57.0]  Diagnosis Additional Information: No value filed.    Anesthesia Type:  General    Note:  Disposition: ICU            ICU Sign Out: Unable to perform physician to physician sign out   Postop Pain Control: Uneventful            Sign Out: Well controlled pain   PONV: No   Neuro/Psych: Uneventful            Sign Out: Acceptable/Baseline neuro status   Airway/Respiratory: Uneventful            Sign Out: AIRWAY IN SITU/Resp. Support               Airway in situ/Resp. Support: ETT                 Reason: Planned Pre-op   CV/Hemodynamics: Uneventful            Sign Out: Acceptable CV status; No obvious hypovolemia; No obvious fluid overload   Other NRE: NONE   DID A NON-ROUTINE EVENT OCCUR? No           Last vitals:  Vitals Value Taken Time   BP     Temp 37.6  C (99.6  F) 10/20/21 1200   Pulse 113 10/20/21 1255   Resp 13 10/20/21 1200   SpO2 99 % 10/20/21 1255   Vitals shown include unvalidated device data.    Electronically Signed By: Lyle Lee MD  October 20, 2021  12:56 PM

## 2021-10-20 NOTE — ANESTHESIA CARE TRANSFER NOTE
Patient: Shin Smith    Procedure: Procedure(s):  Decannulation of femoral arterial and venous vessels, repair of femoral artery and vein, placement of dialysis catheter       Diagnosis: Cardiogenic shock (H) [R57.0]  Diagnosis Additional Information: No value filed.    Anesthesia Type:   General     Note:    Oropharynx: ventilatory support  Level of Consciousness: unresponsive  Patient oxygen source: ambu.      Dentition: dentition unchanged  Vital Signs Stable: post-procedure vital signs reviewed and stable  Report to RN Given: handoff report given  Patient transferred to: ICU  Comments: To icu following ct scan of head  Pt monitored; IABP 1:1 and  o2/ambu  VSS and report given to icu team  ICU Handoff: Call for PAUSE to initiate/utilize ICU HANDOFF, Identified Patient, Identified Responsible Provider, Reviewed the Pertinent Medical History, Discussed Surgical Course, Reviewed Intra-OP Anesthesia Management and Issues during Anesthesia, Set Expectations for Post Procedure Period and Allowed Opportunity for Questions and Acknowledgement of Understanding      Vitals:  Vitals Value Taken Time   BP     Temp     Pulse 138 10/20/21 1106   Resp     SpO2 95 % 10/20/21 1106   Vitals shown include unvalidated device data.    Electronically Signed By: DUSTY Oliveira CRNA  October 20, 2021  11:07 AM

## 2021-10-20 NOTE — PROGRESS NOTES
"[facility]  Palliative Care Daily Progress Note       Recommendations/discussion & Counseling     Patient seen and examined. I spoke with primary team, ICU RN and followed up with family.  Patient spouse (Allyson) informed by CSI team-he was successfully decannulated 10/20 however CT Head imaging showed significant concern for anoxic brain injury. Head MRI pending for 10/21. Off sedation as of am of 10/19 with no purposeful responses. Remains on CRRT with oliguria.   Spoke with spouse and some of patient's large extended family in hospital lobby and tried to answer questions. Offered empathetic listening and presence in the setting of these findings. She has good family support in the area.   Goals at present time are restorative, knowing his prognosis continues to be guarded.   He has not been in the hospital overnight for any reason in the 41 years of their marriage.  Palliative  following for support.  Spouse remains open to care conference as the patient is the youngest of 10 siblings and many are interested in being updated with his status.   Full code (no healthcare directives are completed.)  Patient spouse indicates that the never discussed limited treatment plans only that \"he would not want to be a vegetable.\"      Assessments          Shin Smith is a 66 year old male without known significant prior medical history who was found to have change in his sleep (sonorous respirations and urinary incontinence) followed by unresponsiveness by his wife who performed CPR and called EMS -was found to be in VF s/p shocks X3 amio and epi with ROSC-  presented to [facility] with recurrent VF arrest and intermittent ROSC with underlying cardiogenic shock- cannulated emergently on VA ECMO --> [facility] cath lab --> no significant coronary disease. Unclear down time.   Head CT imaging without early findings of anoxic injury however findings hospital day 3 " "revealed-  \"Diffuse cerebral edema effacing the sulci and partially compressing  the ventricles with blurring of the gray-white matter consistent with  anoxic brain injury. No intracranial hemorrhage or herniation\".    Today,10/20 the patient was seen for PC follow up support 2/2 sequelae post cardiac arrest    Prognosis, Goals, or Advance Care Planning was addressed today with: No.  Mood, coping, and/or meaning in the context of serious illness were addressed today: No.  Summary/Comments: see consult note dated 10/18   Lyle MONTANO NP  Nurse Practitioner- Lead Advanced Practice Provider  [facility] Palliative Medicine Consult Service   [phone]  TT spent: 44 minutes of which 33 minutes were spent in direct face to face contact with patient/family. Greater than 50% of time spent counseling and/or coordinating care.          Interval History:     Chart review/discussion with unit or clinical team members:   Decannulation from ECMO. Head CT is concerning. Off sedation as of am of 10/19.   Key Palliative Symptoms:  We are not helping to manage these symptoms currently in this patient.           Review of Systems:     ROS was unable to be obtained 2/2 intubated and critically ill status.           Medications:     I have reviewed this patient's medication profile and medications during this hospitalization.           Physical Exam:   Vitals were reviewed  Temp: 98.1  F (36.7  C) Temp src: Oral   Pulse: 101   Resp: 15 SpO2: 100 % O2 Device: Mechanical Ventilator   10/20 unchanged as noted from 10/19 exam below.   General: In bed, intubated, minimally responsive.  HEENT: symmetric features. Orally intubated. OG tube in place, again no scleral icterus or injection  CARDIAC: RRR, no m/r/g appreciated.  RESP: Mechanical breath sounds, no wheeze anteriorly  GI: NABS/ND  : Garcia in place- minimal urine output- on CRRT.   EXTREMITIES: trace UE and LE edema, cool extremities. Dusky right foot. Pulses + doppler " carlos.    SKIN: No acute lesions appreciated  NEURO: Intubated, minimally responsive. vEEG ongoing with slow findings- risk for seizures.             Data Reviewed:     ROUTINE LABS (Last four results)  BMP  Recent Labs   Lab 10/20/21  1444 10/20/21  1131 10/20/21  1128 10/20/21  1127 10/20/21  1022 10/20/21  0949 10/20/21  0949 10/20/21  0927 10/20/21  0927 10/20/21  0341 10/20/21  0340 10/20/21  0340 10/19/21  2353 10/19/21  2153 10/19/21  1757 10/19/21  1548   NA  --   --   --  139  139 138  --  139  --  137   < >  --  138   < > 140   < > 140  140   POTASSIUM  --   --   --  3.9  3.9 4.0  --  4.0  --  4.0   < >  --  4.2   < > 4.3   < > 4.7  4.7   CHLORIDE  --   --   --  104  104  --   --   --   --   --   --   --  106  --  106  --  106  106   JEANNE  --   --   --  8.5  8.5  --   --   --   --   --   --   --  8.4*  --  8.6  --  8.5  8.5   CO2  --   --   --  27  27  --   --   --   --   --   --   --  28  --  27  --  27  27   BUN  --   --   --  43*  43*  --   --   --   --   --   --   --  34*  --  32*  --  32*  32*   CR  --   --   --  3.29*  3.29*  --   --   --   --   --   --   --  2.71*  --  2.73*  --  2.64*  2.64*   * 162* 165* 156*  156* 165*   < > 163*   < > 165*   < >   < > 152*   < > 149*   < > 141*  141*  138*    < > = values in this interval not displayed.     CBC  Recent Labs   Lab 10/20/21  1127 10/20/21  1022 10/20/21  0949 10/20/21  0927 10/20/21  0849 10/20/21  0340 10/19/21  2153 10/19/21  2153 10/19/21  1548 10/19/21  1548   WBC 16.7*  --   --   --   --  19.3*  --  20.9*  --  20.3*   RBC 3.44*  --   --   --   --  3.45*  --  3.66*  --  3.76*   HGB 10.6* 10.3* 10.5* 10.7*   < > 10.6*   < > 11.1*   < > 11.6*   HCT 31.0*  --   --   --   --  30.8*  --  32.8*  --  34.2*   MCV 90  --   --   --   --  89  --  90  --  91   MCH 30.8  --   --   --   --  30.7  --  30.3  --  30.9   MCHC 34.2  --   --   --   --  34.4  --  33.8  --  33.9   RDW 13.2  --   --   --   --  13.2  --  13.2  --  13.2   *   --   --   --   --  103*  --  108*  --  104*    < > = values in this interval not displayed.     INR  Recent Labs   Lab 10/20/21  1127 10/20/21  0340 10/19/21  2153 10/19/21  1548   INR 1.63* 1.65* 1.72* 1.75*

## 2021-10-20 NOTE — ANESTHESIA PROCEDURE NOTES
Perioperative DEUCE Procedure Note    Staff -        Anesthesiologist:  Lyle Lee MD       Performed By: anesthesiologist  Preanesthesia Checklist:  Patient identified, IV assessed, risks and benefits discussed, monitors and equipment assessed, procedure being performed at surgeon's request and anesthesia consent obtained.    DEUCE Probe Insertion    Probe Status PRE Insertion: NO obvious damage  Probe type:  Adult 2D  Bite block used:   Molar  Insertion Technique: Easy, no oropharyngeal manipulation  Insertion complications: None obvious  Billing Report:DEUCE report by Anesthesiologist (See Separate Report note)  Probe Status POST Removal: NO obvious damage    DEUCE Report  General Procedure Information  Images for this study have been archived.  Modalities: 2D, PW Doppler, Color flow mapping and CW Doppler  Diagnostic indications for DEUCE:   Non-ischemic cardiomyopathy  Echocardiographic and Doppler Measurements  Right Ventricle:  Cavity size normal.   Hypertrophy not present.   Thrombus not present.    Global function normal.     Left Ventricle:  Cavity size dilated.   Hypertrophy not present.   Thrombus not present.   Global Function moderately impaired.       Ventricular Regional Function:  6- Basal Inferoseptal:  hypokinetic  7- Mid Anteroseptal:  hypokinetic  12- Mid Inferoseptal:  hypokinetic  Wall Motion Comments:  At the end of the procedure, s/p ECMO decannulation, the LV function had improved to 25-30% with global hypokinesis and further disparity of regional function noted most prominently in the basal and mid septal regions.  Valves  Aortic Valve: Annulus normal.  Stenosis not present.  Regurgitation absent.  Leaflets normal.  Leaflet motions normal.    Mitral Valve: Annulus normal.  Stenosis not present.  Regurgitation +1.  Leaflets normal.  Leaflet motions normal.    Tricuspid Valve: Annulus normal.  Stenosis not present.  Regurgitation +1.  Leaflets normal.  Leaflet motions normal.    Pulmonic  Valve: Annulus normal.  Stenosis not present.  Regurgitation +1.      Aorta: Ascending Aorta: Size normal.  Dissection not present.  Plaque thickness less than 3 mm.  Mobile plaque not present.    Aortic Arch: Size normal.   Dissection not present.   Plaque thickness less than 3 mm.   Mobile plaque not present.    Descending Aorta: Size normal.   Dissection not present.   Plaque thickness less than 3 mm.   Mobile plaque not present.    Other Aortic Findings:  IABP noted to be functional in the descending thoracic aorta  Right Atrium:  Size normal.   Spontaneous echo contrast not present.   Thrombus not present.   Tumor not present.   Device present.   Left Atrium: Size normal.  Spontaneous echo contrast not present.  Thrombus not present.  Tumor not present.  Device not present.    Left atrial appendage normal.   Other Atria Findings:  Device removed by end of case (ECMO cannula)  Atrial Septum: Intra-atrial septal morphology normal.     Ventricular Septum: Intra-ventricular septum morphology normal.       Other Findings:   Pericardium:  normal. Pleural Effusion:  left. No coronary sinus catheter present.   Post Intervention Findings  Procedure(s) performed:  ECMO Decannulation. Global function:  Improved. Regional wall motion: Improved. Surgeon(s) notified of all postintervention findings: Yes.       Echocardiogram Comments

## 2021-10-20 NOTE — ANESTHESIA PREPROCEDURE EVALUATION
"Anesthesia Pre-Procedure Evaluation    Patient: Shin Smith   MRN: [id number] : 1955        Preoperative Diagnosis: Cardiogenic shock (H) [R57.0]    Procedure : Procedure(s):  REMOVAL, CANNULA, ADULT, FOR ECMO          Past Medical History:   Diagnosis Date     Hemorrhoids      Hypertension       Past Surgical History:   Procedure Laterality Date     COLONOSCOPY  2012    Procedure:COLONOSCOPY; Surgeon:SHERLYN ORLANDO; Location:[facility]     HEMORRHOIDECTOMY INTERNAL  2012    Procedure:HEMORRHOIDECTOMY INTERNAL; INTRAOPERATIVE COLONOSCOPY, HEMORRHOIDECTOMY, PROCTOPLASTY (POSSIBLE LIGASURE); Surgeon:SHERLYN ORLANDO; Location:[facility]     NO HISTORY OF SURGERY       PROCTOPLASTY  2012    Procedure:PROCTOPLASTY; Surgeon:SHERLYN ORLANDO; Location:[facility]      No Known Allergies   Social History     Tobacco Use     Smoking status: Never Smoker   Substance Use Topics     Alcohol use: No      Wt Readings from Last 1 Encounters:   10/18/21 109.8 kg (242 lb 1 oz)        Anesthesia Evaluation   Pt has had prior anesthetic. Type: General and MAC.    No history of anesthetic complications       ROS/MED HX  ENT/Pulmonary: Comment: Acute hypoxic respiratory failure   Intubated   Ventilation Mode: CMV/AC  (Continuous Mandatory Ventilation/ Assist Control)  FiO2 (%): 50 %  Rate Set (breaths/minute): 10 breaths/min  Tidal Volume Set (mL): 480 mL  PEEP (cm H2O): 8 cmH2O  Oxygen Concentration (%): 50 %  Resp: 10    (+) GARCIA risk factors, hypertension, obese,  (-) tobacco use   Neurologic: Comment: S/p cooling, euthermic as of 10/19/21.     Per neurology note 10/19/21: \"EEG consistent with moderate to severe diffuse encephalopathy.  Thus far, no seizures following reversal of hypothermia.\"   (-) no seizures   Cardiovascular: Comment: S/p VF arrest on 10/17/21 with defibrillation x3 and VA ECMO cannulization. Finished hypothermia protocol 10/19/21. Now in NSR on amiodarone 400 mg BID.     Norepinephrine gtt at 0-0.1 " mcg/kg/min on 10/19/21.    (+) hypertension-----Taking blood thinners (aspirin 81 mg and heparin gtt while on ECMO) CHF (EF 5-10% on VA ECMO) dysrhythmias (s/p VF arrest, now NSR while on amiodarone 400 mg BID), Previous cardiac testing   Echo: Date: 10/19/2021 Results:  Limited TTE for VA-ECMO turndown     At baseline flow (3.8 L/min with IABP), the LVEF is 10-15%. The LVEDD cannot be estimated. The AV opens with each beat. The RV function is low-normal    At the lowest flow (1.0 L/min without IABP), the LVEF is 15-20%. The LVEDD cannot be estimated. The AV opens with each beat. The RV function is normal.     Compared to the TTE dated 10/18/2021, there is a modest improvement in the LVEF at the lowest level of VA-ECMO support.  Stress Test: Date: Results:    ECG Reviewed: Date: 10/17/2021 Results:  Sinus rhythm   Left bundle branch block   Abnormal ECG   When compared with ECG of 18-OCT-2021 15:26, (unconfirmed)   T wave inversion no longer evident in Anterior leads   QT has lengthened   Confirmed by Ford Navarrete ([id number]) on 10/19/2021 2:02:34 PM  Cath: Date: Results:      METS/Exercise Tolerance:  Comment: 65 y/o male s/p vfib arrest on 10/17 while at home.  CPR immediately initiated, intubated in the field and taken to ED where ROSC was achieved, VA ECMO was placed and the patient was cooled.  L heart cath revealed no clinically significant coronary disease.  Currently with ECMO (3-3.5L) and IABP (1:1) in situ with a favorable turn down study 10/19.  Pressors and inotropes off since 10am 10/19.   Hematologic:     (+) anemia,     Musculoskeletal:  - neg musculoskeletal ROS     GI/Hepatic: Comment: Shock liver    (+) liver disease,     Renal/Genitourinary:     (+) renal disease (on CRRT, creatinine 2.64 on 10/19/21), type: ARF, Pt requires dialysis, type: Hemodialysis,     Endo:     (+) Obesity,     Psychiatric/Substance Use:  - neg psychiatric ROS     Infectious Disease: Comment: On zosyn prophylaxis       Malignancy:  - neg malignancy ROS     Other:            Physical Exam    Airway   unable to assess          Respiratory Devices and Support    ETT:      Dental    unable to assess        Cardiovascular          Rhythm and rate: regular and normal     Pulmonary           breath sounds clear to auscultation           OUTSIDE LABS:  CBC:   Lab Results   Component Value Date    WBC 20.3 (H) 10/19/2021    WBC 25.0 (H) 10/19/2021    HGB 11.6 (L) 10/19/2021    HGB 12.5 (L) 10/19/2021    HCT 34.2 (L) 10/19/2021    HCT 36.4 (L) 10/19/2021     (L) 10/19/2021     (L) 10/19/2021     BMP:   Lab Results   Component Value Date     10/19/2021     10/19/2021    POTASSIUM 4.7 10/19/2021    POTASSIUM 4.7 10/19/2021    CHLORIDE 106 10/19/2021    CHLORIDE 106 10/19/2021    CO2 27 10/19/2021    CO2 27 10/19/2021    BUN 32 (H) 10/19/2021    BUN 32 (H) 10/19/2021    CR 2.64 (H) 10/19/2021    CR 2.64 (H) 10/19/2021     (H) 10/19/2021     (H) 10/19/2021     (H) 10/19/2021     (H) 10/19/2021     COAGS:   Lab Results   Component Value Date     (HH) 10/19/2021    INR 1.75 (H) 10/19/2021    FIBR 466 10/19/2021     POC: No results found for: BGM, HCG, HCGS  HEPATIC:   Lab Results   Component Value Date    ALBUMIN 2.3 (L) 10/19/2021    ALBUMIN 2.3 (L) 10/19/2021    PROTTOTAL 5.4 (L) 10/19/2021     (H) 10/19/2021     (H) 10/19/2021    ALKPHOS 56 10/19/2021    BILITOTAL 0.8 10/19/2021     OTHER:   Lab Results   Component Value Date    PH 7.46 (H) 10/19/2021    LACT 1.6 10/19/2021    A1C 5.5 10/17/2021    JEANNE 8.5 10/19/2021    JEANNE 8.5 10/19/2021    PHOS 4.7 (H) 10/19/2021    PHOS 4.7 (H) 10/19/2021    MAG 2.5 (H) 10/19/2021    .0 (H) 10/19/2021    SED 8 10/19/2021       Anesthesia Plan    ASA Status:  5   NPO Status:  NPO Appropriate    Anesthesia Type: General.     - Airway: ETT   Induction: Intravenous.   Maintenance: Balanced.   Techniques and Equipment:     -  Lines/Monitors: BIS, DEUCE, NIRS            DEUCE Absolute Contra-indication: NONE            DEUCE Relative Contra-indication: NONE     - Blood: T&C     Consents    Anesthesia Plan(s) and associated risks, benefits, and realistic alternatives discussed. Questions answered and patient/representative(s) expressed understanding.     - Discussed with:  Other (See Comment) (Spouse)      - Extended Intubation/Ventilatory Support Discussed: Yes.      - Patient is DNR/DNI Status: No    Use of blood products discussed: Yes.     - Discussed with: Other (see comment) (Spouse).     Postoperative Care    Pain management: IV analgesics.        Comments:    Patient seen and examined.  Risks, benefits and alternatives to GETA discussed with Socorro, the patient's wife at [phone].  Questions answered and she wishes to proceed       H&P reviewed: Unable to attach H&P to encounter due to EHR limitations. H&P Update: appropriate H&P reviewed, patient examined. No interval changes since H&P (within 30 days).         Lyle Peres

## 2021-10-20 NOTE — PROGRESS NOTES
Brief Neurocritical Care Note  Chart reviewed and discussed with the patient's primary inpatient providers. The patient was not evaluated by the NCC team today.    Mr. Smith is a 66yoM who was admitted on 10/17/2021 following Vfib arrest initially requiring VA ECMO.     Throughout the course of this hospitalization, the patient was cooled via hypothermia protocol and has since been re-warmed. Today, he was taken for turndown and eventual decannulation.    Initial head CT obtained on admission showed appropriate grey-white differentiation and well-defined sulci with little evidence of cerebral edema. Repeat non-con head CT was obtained today 10/20 following decannulation and showed blurring of grey-white matter with worsening definition of the sulci concerning for cerebral edema.     vEEG has shown generalized cortical irritability. There are no findings on head CT that would explain these EEG findings.     Given the above findings, it would be reasonable to increase the patient's sodium goal to 140-145 (sodium levels over the past couple of days have been 137-139). Recommend brain MRI to assist in neuroprognostication.     Recommendations:  -Sodium goal 140-145  -3% with frequent sodium checks every 4-6 hours  -May need to discuss the above sodium goals with nephrology as the patient remains on CRRT  -Brain MRI tomorrow  -Will follow along    Discussed with the attending neurointensivist, Dr. Atkinson.    Patricia Garay MD  Neurology PGY-4  10/20/2021 13:32

## 2021-10-20 NOTE — PLAN OF CARE
Major Shift Events     Neuro: Pupils equal round and brisk at 3-4mm, no response to painful stimuli, minimal cough with suctioning, no sedation, EEG remains on. Tremors on and off throughout body, MD aware, monitor for seizure-like activity.      CV: De-cannulated VA-ECMO in OR. Sinus tachycardia 102-122, MAPs > 65 without pressors. IABP 1:1 with 100% augmentation. No product given.     Resp: VC-AC, 40%, , RR 10, PEEP 8. Lung sounds clear over diminished, scant white thick secretions via oral and ET tube.     GI: OG tube feedings re-initiated, unable to assess bowel sounds, no BM.     /Renal: Garcia d/c'd. Insulin gtt off, CRRT goal of 0-50 mL/hr as  BP tolerates.     Plan: Brain MRI tomorrow, family updated at bedside.     For vital signs and complete assessments, please see documentation flowsheets.

## 2021-10-20 NOTE — PROGRESS NOTES
SPIRITUAL HEALTH SERVICES  SPIRITUAL ASSESSMENT Progress Note (Palliative Focus)  [facility] Lobby    REFERRAL SOURCE: Palliative care consult, pt's nurse told me that pt's wife was waiting in the main lobby    Briefly visited with pt, Armando's wife, Sariah in the lobby to check-in and offer emotional and spiritual support. Sariah says Armando's hospitalization is a shock to family and friends because of his good health and activity level. She reported Armando has not been in the hospital for many decades. Sariah told me she has good family support and is anxiously monitoring Armando's medical condition. She agreed that a care conference would be helpful at some point for family get information and ask questions. She spoke about the importance of their [employer] aide and attending weekly mass. Expressed appreciation that Armando was anointed on 10/17.    Plan: Will continue to follow while palliative consult remains open.      Calos Chin  Palliative Chaplain Resident  Pager [phone]    [facility] Palliative Care Inpatient Team Consult pager [phone] (M-F 8-4:30)  After-hours Answering Service [phone]

## 2021-10-20 NOTE — PROGRESS NOTES
ECMO Shift Summary:    ECMO Equipment:  Console serial number: [id number]  Circuit Lot number: [id number]  Oxygenator Lot number: [id number]    Patient remains on VA ECMO, all equipment is functioning and alarms are appropriately set. RPM's 2970 with flow range 3-3.35 L/min. Sweep gas is at 2 LPM and FiO2 70%. There remains a moderate amount of clot/fibrin at two corners of the oxygenator with some streaks along the sides. Cannulas are secure with a small amount of oozing from the site. Extremities are slightly cool to touch, feet are dusky.     Significant Shift Events: None.    Vent settings:  Ventilation Mode: CMV/AC  (Continuous Mandatory Ventilation/ Assist Control)  FiO2 (%): 40 %  Rate Set (breaths/minute): 10 breaths/min  Tidal Volume Set (mL): 500 mL  PEEP (cm H2O): 8 cmH2O  Oxygen Concentration (%): 40 %  Resp: 17  .    Heparin is running at 1500 u/hr, ACT range 175-187.    The patient is on CRRT, blood loss was a small amount from oozing at the cannulation site. No blood product or fluid given overnight.       Intake/Output Summary (Last 24 hours) at 10/20/2021 0609  Last data filed at 10/20/2021 0600  Gross per 24 hour   Intake 1816.77 ml   Output 2084 ml   Net -267.23 ml       ECHO:  No results found for this or any previous visit.  No results found for this or any previous visit.      CXR:  Recent Results (from the past 24 hour(s))   Echo Limited    Narrative    [id number]  [id number]  [id number]  [id number]^RICHI^CHRISTIE^JADEN     [facility]  Echocardiography Laboratory  [address] [place] [id number]     Name: HOSEA LOYOLA  MRN: [id number]  : 1955  Study Date: 10/19/2021 11:34 AM  Age: 66 yrs  Gender: Male  Patient Location: [facility]  Reason For Study: Shock  Ordering Physician: CHRISTIE STODDARD  Referring Physician: LELIA CANTRELL  Performed By: Ariel Desai     BSA: 2.3 m2  Height: 72 in  Weight: 242 lb  HR: 77  BP: 128/36  mmHg  ______________________________________________________________________________  Procedure  Limited Portable Echo Adult. VA ECMO turndown.  ______________________________________________________________________________  Interpretation Summary  Limited TTE for VA-ECMO turndown     At baseline flow (3.8 L/min with IABP), the LVEF is 10-15%. The LVEDD cannot  be estimated. The AV opens with each beat. The RV function is low-normal  At the lowest flow (1.0 L/min without IABP), the LVEF is 15-20%. The LVEDD  cannot be estimated. The AV opens with each beat. The RV function is normal.     Compared to the TTE dated 10/18/2021, there is a modest improvement in the  LVEF at the lowest level of VA-ECMO support.  ______________________________________________________________________________  Vessels  VA-ECMO cannula seen in IVC.     Pericardium  No pericardial effusion is present.  ______________________________________________________________________________  Report approved by: Pb Guzmán 10/19/2021 12:22 PM     ______________________________________________________________________________          Labs:  Recent Labs   Lab 10/20/21  0555 10/20/21  0341 10/20/21  0206 10/19/21  2352   PH 7.47* 7.50* 7.44 7.46*   PCO2 40 36 40 39   PO2 118* 112* 103 159*   HCO3 29* 28 27 28   O2PER 40 40  40  70 40 40       Lab Results   Component Value Date    HGB 10.6 (L) 10/20/2021    PHGB <30 10/20/2021     (L) 10/20/2021    FIBR 534 (H) 10/20/2021    INR 1.65 (H) 10/20/2021     (HH) 10/20/2021    DD 3.73 (H) 10/20/2021    ANTCH 61 (L) 10/19/2021         The patient is an add-on case in the OR today for decannulation. Plan is to continue VA ECMO until then and adjust settings as needed.    Fawad Zhou, RT  ECMO Specialist  10/20/2021 6:34 AM

## 2021-10-20 NOTE — PROGRESS NOTES
CRRT STATUS NOTE    DATA:  Time: 1700  Pressures WNL:  YES  Filter Status:  WDL  Problems Reported/Alarms Noted:  none  Supplies Present:  YES    ASSESSMENT:  Patient Net Fluid Balance:  Yesterday, Net +173mL; Today thus far net +321mL  Vital Signs: on no pressors; IABP. 40% PEEP 8 vent:  ; BP 120s/40s (80)   Labs:  Stable; WBC rising to 19.  Goals of Therapy:  Net -0-50mL/hr. Meeting goals of therapy.     INTERVENTIONS:   Pt decanulated this AM and to CT. CRRT off 5hrs; resumed with new circuit.   MRI brain tomorrow     PLAN:   Continue CRRT to meet goals of therapy.   Contact CRRT RN [phone] with concerns.

## 2021-10-20 NOTE — PLAN OF CARE
Major Shift Events:  Remains off all sedation, PERRLA +3, did withdraw to pain in BUE X1, +cough/gag and biting down with oral cares, overbreathing vent. SR with LBBB HR 's, no ectopy noted. IABP 1:1 100%. ECMO flows 3.0-3.3 L/min, sweep gas 2 LPM, FiO2 70%. No product or fluid given. Off pressors. Thermogard to maintain normothermia.  RLE cool and dusky, outlined, unchanged, absent pedal pulses bilaterally, able to doppler post-tibial pulses. Vent settings unchanged. Advancing tube feeds per order, bowel regimen started. Oliguric, CRRT net negative 250 ml since 0000.   Plan: Repeat head CT and possible decannulation today in OR  For vital signs and complete assessments, please see documentation flowsheets.      Piedad Ashley RN on 10/20/2021 at 6:05 AM

## 2021-10-20 NOTE — PROGRESS NOTES
[facility]  Cardiology ICU Progress Note    Plan for Today:     - Wean Sedation  - Tentative OR this AM for turn down and decannulation  - Continue CRRT for hyperkalemia and oliguric XIOMARA  - head CT     Assessment and Plan:     Neurology: # Concern for anoxic brain injury  - Initial head CT negative    - Intubated, sedated. Rewarmed  - Wean sedation as tolerated  - Neuro-crit consulted and appreciate recommendations.   - vEEG   - Palliative care consulted.   - Head CT ----Will repeat on day 3 (today)   Cardiovascular / Hemodynamics: # Refractory VF Cardiac arrest requiring VA ECMO  # NICM - EF 10-20%  Peripheral V-A ECMO inserted via RCFA and RCFV  TTE: Severe LV Dysfunction, LVEF 10-20%  Coronary Angiogram: No coronary artery disease  ECMO cares:              Flows 3.5 LPM, decrease to 3 LPM              Sweep 2 LPM              ACT goal 180-200  - IABP, EKG triggered, 1:1, 100% augmentation  - Pulsatility 25-30 mmHg with IABP paused  - Decannulation  - continue PO amiodarone   - Continue ASA 81mg  - Hold lipitor for now given shock liver  - Hold ACE/ARB for now given likely reduced renal fxn after arrest  - Holding beta blocker given shock  - US LE w/ arterial duplex reveal monophasic flow with obstructed REINIER bilaterally  - Monitor NIRS, CK levels, warm compression to the R foot.      Pulmonary: # Acute hypoxemic respiratory failure requiring intubation  # Probable aspiration pneumonia  # Hx of tobacco use ??- consider NRP when appropriate  Vent Settings: CMV/10/480/8/50%   AB.38/41/85/24  ETT in appropriate position .   CXR:   - Wean vent as able  - Daily CXR  - Q2h ABGs   - Consider scheduled duonebs if signs of lung dz, currently PRN     GI and Nutrition: # Shock liver 2/2 cardiac arrest  No known medical hx.   - Monitor BID LFTs  - Start tube feeds  - Bowel regimen - on hold for now due to hypothermia     #GI Prophylaxis: PPI; Protonix 40 mg daily   Renal,  "Fluid and Electrolytes: # Acute Renal Injury, likely 2/2  ATN  # Hypernatremia  # Hypoalbuminemia  # Lactic acidosis- improving  - On CRRT for hyperkalemia and low UOP  - Net negative 500cc to 1L today if possible  - Appreciate renal support   Infectious Disease: # Aspiration Pneumonia- in the setting of VF arrest.  CXR as above.   # Leukocytosis  Leukocytosis c/w arrest.   - Monitor for signs of infection given cooling, lines, and leukocytosis  - Daily blood cultures.   Cultures thus far: Sputum Cx with GPC, MRSA nares negative                Antibiotics               - Vancomycin 10/17 - 10/18                - Zosyn 10/17 - present    Hematology and Oncology: # Anemia of critical illness  Hgb stable. No e/o bleeding.  - Receiving heparin for ECMO with ACT goal 180-200   - PRBC for hbg < 8  - platelet for plt < 50  - FFP for INR > 2  - Cryoprecipitate for fibrinogen < 150  - Carotid artery US with patent vessels. per ECMO protocol   - DVT PPX: Heparin as above      Endocrinology: # Hyperglycemia   - Insulin gtt as needed  - Hgb a1c   Lines: R femoral arterial and venous ECMO cannulae October 17, 2021  L femoral arterial line October 17, 2021  R radial arterial line October 17, 2021  ETT October 17, 2021  Garcia catheter October 17, 2021  OG tube October 17, 2021  Current lines are required for patient management       Family update by me today: Yes      Code Status: FULL     Patient seen and discussed with staff physician, Dr. James Almaraz MD   Cardiology Fellow, PGY-5  October 20, 2021  7:35 AM     Interval Events:  No overnight events. Remains off of pressors Plans for Decannulation this AM.     Objective:  Most recent vital signs:  Pulse 95   Temp 98.6  F (37  C) (Bladder)   Resp 13   Ht 1.829 m (6' 0.01\")   Wt 110 kg (242 lb 8.1 oz)   SpO2 100%   BMI 32.88 kg/m    Temp:  [98.6  F (37  C)-98.8  F (37.1  C)] 98.6  F (37  C)  Pulse:  [] 95  Resp:  [10-20] 13  MAP:  [62 mmHg-82 mmHg] 75 " mmHg  Arterial Line BP: (103-140)/(30-45) 119/37  FiO2 (%):  [40 %-50 %] 40 %  SpO2:  [93 %-100 %] 100 %  Wt Readings from Last 2 Encounters:   10/20/21 110 kg (242 lb 8.1 oz)   10/17/21 112.5 kg (248 lb 0.3 oz)         Intake/Output Summary (Last 24 hours) at 10/20/2021 0735  Last data filed at 10/20/2021 0700  Gross per 24 hour   Intake 1838.37 ml   Output 2138 ml   Net -299.63 ml         Physical exam:  General: In bed, intubated,  HEENT: PERRL. No scleral icterus or injection  CARDIAC: RRR, no m/r/g appreciated. Balloon pump ascultated  RESP: Mechanical breath sounds, no wheezes, rhonchi or crackles appreciated anteriorly  GI: NABS, NT/ND, no guarding or rebound  EXTREMITIES: NO LE edema, cool extremities. Dusky top and bottom of right foot. Pulses not palpable bilaterally.  Femoral access sites w/o bleeding, dressing c/d/i. No bruits appreciated.   SKIN: No acute lesions appreciated  NEURO: Intubated, sedated    Labs (Past three days):  CBC  Recent Labs   Lab 10/20/21  0340 10/19/21  2153 10/19/21  1548 10/19/21  1010   WBC 19.3* 20.9* 20.3* 25.0*   RBC 3.45* 3.66* 3.76* 4.00*   HGB 10.6* 11.1* 11.6* 12.5*   HCT 30.8* 32.8* 34.2* 36.4*   MCV 89 90 91 91   MCH 30.7 30.3 30.9 31.3   MCHC 34.4 33.8 33.9 34.3   RDW 13.2 13.2 13.2 13.3   * 108* 104* 129*     BMP  Recent Labs   Lab 10/20/21  0555 10/20/21  0341 10/20/21  0340 10/19/21  2353 10/19/21  2153 10/19/21  1944 10/19/21  1757 10/19/21  1548 10/19/21  1153 10/19/21  1010 10/19/21  0353 10/19/21  0352 10/18/21  2003 10/18/21  1958   NA  --   --  138  --  140  --   --  140  140  --  140   < > 142   < > 146*   POTASSIUM  --   --  4.2  --  4.3  --   --  4.7  4.7  --  4.8   < > 5.3   < > 4.9   CHLORIDE  --   --  106  --  106  --   --  106  106  --  108   < > 110*   < > 113*   CO2  --   --  28  --  27  --   --  27  27  --  26   < > 25   < > 26   ANIONGAP  --   --  4  --  7  --   --  7  7  --  6   < > 7   < > 7   * 156* 152* 140* 149*   < >   < >  141*  141*  138*   < > 116*   < > 126*   < > 117*   BUN  --   --  34*  --  32*  --   --  32*  32*  --  30   < > 30   < > 33*   CR  --   --  2.71*  --  2.73*  --   --  2.64*  2.64*  --  2.52*   < > 2.41*   < > 2.38*   GFRESTIMATED  --   --  24*  --  24*  --   --  24*  24*  --  26*   < > 27*   < > 28*   JEANNE  --   --  8.4*  --  8.6  --   --  8.5  8.5  --  8.3*   < > 8.3*   < > 8.2*   MAG  --   --  2.1  --  2.2  --   --  2.5*  --  1.9   < > 2.1   < > 2.5*   PHOS  --   --  3.8  --   --   --   --  4.7*  4.7*  --   --   --  5.5*  --  4.7*    < > = values in this interval not displayed.     Troponins:     INR  Recent Labs   Lab 10/20/21  0340 10/19/21  2153 10/19/21  1548 10/19/21  1010   INR 1.65* 1.72* 1.75* 1.73*     Liver panel  Recent Labs   Lab 10/20/21  0340 10/19/21  2153 10/19/21  1548 10/19/21  1010   PROTTOTAL 5.4* 5.5* 5.4* 5.5*   ALBUMIN 2.3* 2.3* 2.3*  2.3* 2.5*   BILITOTAL 0.6 0.8 0.8 0.8   ALKPHOS 50 56 56 51   * 340* 395* 462*   * 263* 296* 319*

## 2021-10-20 NOTE — PROGRESS NOTES
CRRT STATUS NOTE    DATA:  Time:  4:00 AM  Pressures WNL:  YES  Filter Status:  WDL    Problems Reported/Alarms Noted:  None.    Supplies Present:  YES    ASSESSMENT:  Patient Net Fluid Balance:  Net -165 ml @ 0400.  Vital Signs:  HR 90, /34, MAP 68  Labs:  K 4.2, Mg 2.1, Phos 3.8, iCa 4.8, Hgb 10.6, Plt 103  Goals of Therapy:  0-50    INTERVENTIONS:   None.    PLAN:  Continue to monitor circuit daily and change set q72 hours or PRN for clotting/clogging. Please call CRRT RN with any quesitons/problems.

## 2021-10-20 NOTE — OP NOTE
DATE OF SERVICE: 10/20/2021.     PREOPERATIVE DIAGNOSES:  1.  Recent ventricular fibrillation cardiac arrest.  2.  Status post percutaneous femoral cannulation for veno-arterial extracorporeal membrane oxygenation.  3.  Acute kidney  Injury requiring hemodialysis.     POSTOPERATIVE DIAGNOSES:  1.  Recent ventricular fibrillation cardiac arrest.  2.  Status post percutaneous femoral cannulation for veno-arterial extracorporeal membrane oxygenation.  3.  Acute kidney  Injury requiring hemodialysis.     PROCEDURE PERFORMED:  1. Right femoral arterial and venous decannulation and primary repair of right femoral artery cannulation site.  2. Left subclavian dialysis catheter placement (12 Fr. X 20 cm Mahurkar Elite acute dual lumen dialysis catheter)     SURGEON:  Cecilio Mi MD, PhD.     FIRST ASSISTANT:  Dwayne Holloway PA-C.      ANESTHESIA:  General endotracheal anesthesia.     ANESTHESIOLOGIST:  Lyle Lee MD.     ESTIMATED BLOOD LOSS:  50 mL.     SPECIMEN:  None.     INDICATIONS FOR PROCEDURE: Mr. Smith is an 66 year-old man who had a ventricular fibrillation cardiac arrest of unclear etiology and was placed on right femoral veno-arterial extracorporeal membrane oxygenation. He is now improved hemodynamically and able to wean off of VA-ECMO. He also has acute kidney injury requiring hemodialysis. The wife of the patient understands the risks and benefits of the procedure and wishes for him to undergo the operation.     OPERATIVE FINDINGS:  The patient was hemodynamically stable and stable from a respiratory standpoint while weaning off of VA-ECMO. Hemostasis of the femoral vessels was noted. A distal femoral artery triphasic doppler signal was present after repair of the femoral artery.     OPERATIVE DESCRIPTION IN DETAIL:  After obtaining informed consent, the patient was brought to the operating room and placed in the supine in the bed in the intensive care unit.  Appropriate lines and devices for  monitoring were already in place.  The patient underwent smooth induction of general anesthesia with propofol.       The right femoral artery and vein were exposed through an oblique femoral incision that was extended distally to the distal perfusion cannula.     After weaning from VA-ECMO, the femoral venous cannula was clamped and removed as a 4-0 Prolene pursestring suture was tied down and cut.     After obtaining proximal and distal control of the  right common femoral artery, the femoral arterial cannula was removed and the femoral arteriotomy was repaired primarily with interrupted 6-0 Prolene sutures. The distal perfusion cannula was removed and this arteriotomy was closed with a 6-0 Prolene simple mattress suture.    Hemostasis was achieved. The wound was closed in layers with absorbable suture and a sterile dressing was applied.  All sponge, needle and instrument counts were correct at the end of the case.       The chest was prepped and draped. The left subclavian vein was accessed with a 19 g. needle, and using sterile Seldinger technique, a 12 Fr. X 20 cm Mahurkar Elite acute dual lumen dialysis catheter was placed. This was flushed with sterile saline, sutured into place, and a sterile dressing was applied.     LAI BOBBY MD

## 2021-10-20 NOTE — PROGRESS NOTES
"  Nephrology Progress Note  10/20/2021         Assessment & Recommendations:   Shin Smith is a 66 year old year old male admitted with cardiogenic shock after V. fib arrest.  He received cardioversion and then underwent targeted temperature management.  He was noted to have hyperkalemia even prior to reinvolvement and was initiated on CRRT.  Also developed nonoliguric acute kidney injury.    # Non oliguric acute kidney injury, likely secondary acute tubular injury in setting of cardiogenic shock  # Hyperkalemia   # V Fib arrest with ROSC, s/p targeted temp mgmt, now undergoing rewarming      Plan:   -Continue CRRT for hyperkalemia  - 4 K bath, UF-fluid removal 0 to 50 mm/h as tolerated by hemodynamics.  If potassium levels continue to downtrend will change to 4K bath.  - Strict Intake, output monitoring  - Daily weight checks  - Renally dose all medications      # Cardiogenic shock, on VA ECMO, IABP and vasopressor support  # Shock liver     Recommendations were communicated to primary team via note    Seen and discussed with Dr. Ryan Amador MD   [phone]    Interval History :   Nursing and provider notes from last 24 hours reviewed.    Urine output of 157 mL in past 24 hours, UF 1.1 L, net +173 mL.  PEEP 8, FiO2 40%.  Not on vasopressors, MAP greater than 65    Review of Systems:   Unable to obtain as intubated    Physical Exam:   I/O last 3 completed shifts:  In: 1816.77 [I.V.:1196.77; NG/GT:280]  Out: 2085 [Urine:98; Emesis/NG output:100; Other:1887]   Pulse 94   Temp 98.8  F (37.1  C) (Bladder)   Resp 14   Ht 1.829 m (6' 0.01\")   Wt 110 kg (242 lb 8.1 oz)   SpO2 98%   BMI 32.88 kg/m       GENERAL APPEARANCE: Intubated and sedated  EYES:  no scleral icterus, pupils equal  HENT: mouth without ulcers or lesions  PULM: lungs conducted air sounds, equal air movement, no clubbing  CV: regular rhythm, normal rate, no rub     -JVD not distented     -edema absent   GI: soft, not-stended, bowel sounds " are +  INTEGUMENT: no cyanosis, no rash      Labs:   All labs reviewed by me  Electrolytes/Renal - Recent Labs   Lab Test 10/20/21  1022 10/20/21  0949 10/20/21  0927 10/20/21  0341 10/20/21  0340 10/19/21  2353 10/19/21  2153 10/19/21  1757 10/19/21  1548 10/19/21  0353 10/19/21  0352    139 137   < > 138   < > 140   < > 140  140   < > 142   POTASSIUM 4.0 4.0 4.0   < > 4.2   < > 4.3   < > 4.7  4.7   < > 5.3   CHLORIDE  --   --   --   --  106  --  106  --  106  106   < > 110*   CO2  --   --   --   --  28  --  27  --  27  27   < > 25   BUN  --   --   --   --  34*  --  32*  --  32*  32*   < > 30   CR  --   --   --   --  2.71*  --  2.73*  --  2.64*  2.64*   < > 2.41*   * 163* 165*   < > 152*   < > 149*   < > 141*  141*  138*   < > 126*   JEANNE  --   --   --   --  8.4*  --  8.6  --  8.5  8.5   < > 8.3*   MAG  --   --   --   --  2.1  --  2.2  --  2.5*   < > 2.1   PHOS  --   --   --   --  3.8  --   --   --  4.7*  4.7*  --  5.5*    < > = values in this interval not displayed.       CBC -   Recent Labs   Lab Test 10/20/21  1022 10/20/21  0949 10/20/21  0927 10/20/21  0849 10/20/21  0340 10/19/21  2153 10/19/21  2153 10/19/21  1548 10/19/21  1548   WBC  --   --   --   --  19.3*  --  20.9*  --  20.3*   HGB 10.3* 10.5* 10.7*   < > 10.6*   < > 11.1*   < > 11.6*   PLT  --   --   --   --  103*  --  108*  --  104*    < > = values in this interval not displayed.       LFTs -   Recent Labs   Lab Test 10/20/21  0340 10/19/21  2153 10/19/21  1548   ALKPHOS 50 56 56   BILITOTAL 0.6 0.8 0.8   * 263* 296*   * 340* 395*   PROTTOTAL 5.4* 5.5* 5.4*   ALBUMIN 2.3* 2.3* 2.3*  2.3*       Iron Panel - No lab results found.      Imaging:  All imaging studies reviewed by me.     Current Medications:    acetaminophen  650 mg Oral Q6H     amiodarone  400 mg Oral or Feeding Tube BID     aspirin  81 mg Per Feeding Tube Daily     B and C vitamin Complex with folic acid  5 mL Per Feeding Tube Daily     chlorhexidine   15 mL Swish & Spit BID     pantoprazole (PROTONIX) IV  40 mg Intravenous Daily     piperacillin-tazobactam  4.5 g Intravenous Q6H     polyethylene glycol  17 g Oral or Feeding Tube Daily     propofol         propofol  40 mg Intravenous Once     protein modular  2 packet Per Feeding Tube TID     senna-docusate  1 tablet Oral or Feeding Tube BID       dextrose       dextrose 10% Stopped (10/18/21 2000)     dextrose       CRRT replacement solution 15 mL/kg/hr (10/20/21 0635)     fentaNYL 75 mcg/hr (10/20/21 1119)     HEParin Stopped (10/20/21 0959)     heparin (PRESSURE BAG) 2 unit/mL in 0.9% NaCl 6 Units/hr (10/20/21 0000)     insulin regular Stopped (10/18/21 1400)     midazolam Stopped (10/19/21 0900)     - MEDICATION INSTRUCTIONS -       norepinephrine Stopped (10/19/21 1500)     CRRT replacement solution 200 mL/hr at 10/19/21 1837     CRRT replacement solution 15 mL/kg/hr (10/20/21 0634)     Blair Amador MD

## 2021-10-21 NOTE — PROGRESS NOTES
[facility], Procedure Note           Intra-Aortic Balloon Pump Discontinuation:       Shin Smith  MRN# [id number]   [id number]             IABP discontinued: October 21, 2021, 11:00 AM   Removed by: Fawad Monterroso MD   Catheter saved: No      Recorded by Yamileth Chung, RRT

## 2021-10-21 NOTE — PROGRESS NOTES
Intraaortic Balloon Pump Removal    Shin Smith was intubated and sedated throughout the procedure.  The heparin gtt was d/c'd prior to removal of IABP.  His BP was stable w/ pump momentarily on standby immediately prior to removal.  The balloon pump and sheath were removed w/o issue. Manual pressure was held and hemostasis achieved. The groin site was c/d/i and vital signs were stable. B/l LE were warm, no signs of distal ischemia were visible, and the b/l LE dorsalis pedis pulses were detectable by doppler. No clot was visible on the balloon pump after removal. Trace blood loss during procedure.     Fawad Monterroso MD  Cardiology Fellow

## 2021-10-21 NOTE — PROGRESS NOTES
CRRT STATUS NOTE    DATA:  Time:  6:58 AM  Pressures WNL:  YES  Filter Status:  WDL    Problems Reported/Alarms Noted:  Dialysis scale malfunction-rebooted machine-alarm resolved    Supplies Present:  YES    ASSESSMENT:  Patient Net Fluid Balance:  10/20 -21  0600 10/21 -425  Vital Signs:  Vented 40% IABP MAP >65  Labs:  K 4.2  Ca 4.5  WBC 17.9  Na 139 (titrating 3% per neuro recs)  Goals of Therapy:  0-50ml/hr net, meeting goals of therapy    INTERVENTIONS:   Resolved scale malfunction alarm    PLAN:  Brain MRI and IABP removal.  Continue per Renal POC. Call CRRT resource RN [phone] with concerns.

## 2021-10-21 NOTE — PROGRESS NOTES
Nephrology  Progress note- continuous dialysis visit  10/21/2021     Mr Smith was seen once on CRRT for volume management and dialysis prescription.   Laboratory results and nurses' notes were reviewed.   No changes to management of volume, acidosis, or electrolytes.     Marsha Davis MD

## 2021-10-21 NOTE — PLAN OF CARE
Major Shift Events: Adjusted heparin drip and administered bolus according to AntiXA and heparin algorithm at 0100. Recheck at 0700. Ptt lab resulted a critical value at 0400, reported to provider. Will continue to follow  heparin drip Algorithm. No changes in neuro status. Sodium sat 139 to 140. Increased 3% from 15 to 20 ml/hr. Afebrile throughout shift. Balloon pump order clarified. Running 1:2. Patient tolerating well.     Plan: Check AntiXA at 0700 and adjust heparin drip. Recheck Sodium at 0800.     For vital signs and complete assessments, please see documentation flowsheets.    Problem: Adult Inpatient Plan of Care  Goal: Plan of Care Review  Outcome: No Change  Goal: Patient-Specific Goal (Individualized)  Outcome: No Change  Goal: Absence of Hospital-Acquired Illness or Injury  Outcome: No Change  Intervention: Identify and Manage Fall Risk  Recent Flowsheet Documentation  Taken 10/21/2021 0400 by Leatha Reddy RN  Safety Promotion/Fall Prevention:   safety round/check completed   lighting adjusted   increase visualization of patient   clutter free environment maintained  Taken 10/21/2021 0000 by Leatha Reddy RN  Safety Promotion/Fall Prevention:   safety round/check completed   lighting adjusted   increase visualization of patient   clutter free environment maintained  Taken 10/20/2021 2000 by Leatha Reddy RN  Safety Promotion/Fall Prevention:   safety round/check completed   lighting adjusted   increase visualization of patient   clutter free environment maintained  Intervention: Prevent Skin Injury  Recent Flowsheet Documentation  Taken 10/21/2021 0400 by Leatha Reddy RN  Body Position:   left   turned  Taken 10/21/2021 0200 by Leatha Reddy RN  Body Position:   right   turned  Taken 10/21/2021 0000 by Leatha Reddy RN  Body Position:   left   turned  Taken 10/20/2021 2200 by Leatha Reddy RN  Body Position:   turned   right  Taken 10/20/2021 2000 by Leatha Reddy  RN  Body Position:   turned   left  Intervention: Prevent and Manage VTE (Venous Thromboembolism) Risk  Recent Flowsheet Documentation  Taken 10/21/2021 0400 by Leatha Reddy RN  VTE Prevention/Management:   anticoagulant therapy maintained   pneumatic compression device   bleeding risk assessed  Taken 10/21/2021 0000 by Leatha Reddy RN  VTE Prevention/Management:   anticoagulant therapy maintained   pneumatic compression device   bleeding risk assessed  Taken 10/20/2021 2000 by Leatha Reddy RN  VTE Prevention/Management:   anticoagulant therapy maintained   pneumatic compression device   bleeding risk assessed  Goal: Optimal Comfort and Wellbeing  Outcome: No Change  Goal: Readiness for Transition of Care  Outcome: No Change

## 2021-10-21 NOTE — PHARMACY-VANCOMYCIN DOSING SERVICE
"Pharmacy Vancomycin Initial Note  Date of Service 2021  Patient's  1955  66 year old, male    Indication: Sepsis    Current estimated CrCl = Estimated Creatinine Clearance: 30.9 mL/min (A) (based on SCr of 3.06 mg/dL (H)).    Creatinine for last 3 days  10/18/2021:  7:58 PM Creatinine 2.38 mg/dL;  9:46 PM Creatinine 2.34 mg/dL  10/19/2021:  3:52 AM Creatinine 2.41 mg/dL; 10:10 AM Creatinine 2.52 mg/dL;  3:48 PM Creatinine 2.64 mg/dL;  3:48 PM Creatinine 2.64 mg/dL;  9:53 PM Creatinine 2.73 mg/dL  10/20/2021:  3:40 AM Creatinine 2.71 mg/dL; 11:27 AM Creatinine 3.29 mg/dL; 11:27 AM Creatinine 3.29 mg/dL;  3:35 PM Creatinine 3.03 mg/dL;  9:43 PM Creatinine 2.91 mg/dL;  9:43 PM Creatinine 2.91 mg/dL  10/21/2021:  3:44 AM Creatinine 2.80 mg/dL;  9:53 AM Creatinine 2.74 mg/dL;  4:06 PM Creatinine 3.06 mg/dL;  4:06 PM Creatinine 3.06 mg/dL    Recent Vancomycin Level(s) for last 3 days  No results found for requested labs within last 72 hours.      Vancomycin IV Administrations (past 72 hours)                   vancomycin (VANCOCIN) 2,500 mg in sodium chloride 0.9 % 500 mL intermittent infusion (mg) 2,500 mg New Bag 10/21/21 1706                Nephrotoxins and other renal medications (From now, onward)    Start     Dose/Rate Route Frequency Ordered Stop    10/22/21 170  vancomycin (VANCOCIN) 1,750 mg in sodium chloride 0.9 % 250 mL intermittent infusion      1,750 mg (central catheter)  over 90 Minutes Intravenous EVERY 24 HOURS 10/21/21 1715      10/21/21 1700  vancomycin (VANCOCIN) 2,500 mg in sodium chloride 0.9 % 500 mL intermittent infusion      2,500 mg  over 120 Minutes Intravenous ONCE 10/21/21 1640      10/20/21 2000  piperacillin-tazobactam (ZOSYN) 4.5 g vial to attach to  mL bag     Note to Pharmacy: For [facility], [facility] and [facility]: For Zosyn-naive patients, use the \"Zosyn initial dose + extended infusion\" order panel.    4.5 g  over 30 Minutes Intravenous EVERY 6 HOURS 10/20/21 1427 10/22/21 " 1359          Contrast Orders - past 72 hours (72h ago, onward)    None              Plan:  1. Start vancomycin  2500 mg IV x 1 loading dose then 1750 mg IV q24h.   2. Vancomycin monitoring method: Renal Replacement Therapy  3. Vancomycin therapeutic monitoring goal: 15-20 mg/L  4. Pharmacy will check vancomycin levels as appropriate in 1-3 Days.    5. Serum creatinine levels will be ordered daily for the first week of therapy and at least twice weekly for subsequent weeks.      Abby Oliveira, PharmD

## 2021-10-21 NOTE — PROGRESS NOTES
"[facility]  Palliative Care Daily Progress Note       Recommendations/discussion & Counseling     Patient seen and examined. I spoke with primary team, ICU RN Patient spouse (Allyson) informed by CSI team-he was successfully decannulated 10/20 however CT Head imaging showed significant concern for anoxic brain injury. Head MRI 10/21- findings as below-   Off sedation as of am of 10/19 with no purposeful responses. Remains on CRRT with oliguria. IABP support discontinued 10/21.Yesterday I spoke  with spouse and some of patient's large extended family in hospital lobby and tried to answer questions.   Offered empathetic listening and presence in the setting of imaging findings. Allyson has good family support.   Goals were restorative, increasing concern of worsening prognosis.  Palliative  following for support.  Spouse open to care conference 10/22 as the patient is the youngest of 10 siblings and many are interested in being updated with his status.   Full code (no healthcare directives are completed.)  Patient spouse indicates that the never discussed limited treatment plans only that \"he would not want to be a vegetable.\"      Assessments          Shin Smith is a 66 year old male without known significant prior medical history who was found to have change in his sleep (sonorous respirations and urinary incontinence) followed by unresponsiveness by his wife who performed CPR and called EMS -was found to be in VF s/p shocks X3 amio and epi with ROSC-  presented to  [facility] with recurrent VF arrest and intermittent ROSC with underlying cardiogenic shock- cannulated emergently on [facility] ECMO --> [facility] cath lab --> no significant coronary disease. Unclear down time.     Head CT imaging (10/20)  led to MRI imaging (10/21):    1. Diffuse cortical infarcts in the cerebrum greater than the  cerebellum likely representing diffuse hypoxic ischemic injury.   2. " Diffuse cerebral and cerebellar foci of susceptibility suggestive  of multifocal microhemorrhage which could be secondary to amyloid  angiopathy or possibly coagulopathy.    Today,10/21 the patient was seen for PC follow up support 2/2 sequelae post cardiac arrest    Prognosis, Goals, or Advance Care Planning was addressed today with: No.  Mood, coping, and/or meaning in the context of serious illness were addressed today: No.  Summary/Comments: see consult note dated 10/18   Lyle MONTANO NP  Nurse Practitioner- Lead Advanced Practice Provider  [facility] Palliative Medicine Consult Service   [phone]  TT spent: 33 minutes of which 18 minutes were spent in direct face to face contact with patient/family. Greater than 50% of time spent counseling and/or coordinating care.          Interval History:     Chart review/discussion with unit or clinical team members:   Decannulation from ECMO and discontinued IABP. Head imaging- as above- is concerning. Off sedation as of am of 10/19.   Key Palliative Symptoms:  We are not helping to manage these symptoms currently in this patient.           Review of Systems:     ROS was unable to be obtained 2/2 intubated and critically ill status.           Medications:     I have reviewed this patient's medication profile and medications during this hospitalization.           Physical Exam:   Vitals were reviewed  Temp: 99.4  F (37.4  C) Temp src: Axillary   Pulse: 101   Resp: 24 SpO2: 100 % O2 Device: Mechanical Ventilator .   General: In bed, intubated, minimally responsive.  HEENT: symmetric features noting he is orally intubated. OG tube in place, again no scleral icterus. Eyes are open, but the patient is not tracking. He does not follow simple commands.   CARDIAC: RRR, no m/r/g appreciated.  RESP: Mechanical breath sounds, no wheeze anteriorly  GI: NABS/ND  : Garcia in place- minimal urine output- on CRRT.   EXTREMITIES: trace UE and LE edema, cool extremities. Dusky  right foot. Pulses + doppler carlos.    SKIN: No acute lesions appreciated  NEURO: Intubated, minimally responsive. vEEG ongoing with slow findings- risk for seizures.             Data Reviewed:     ROUTINE LABS (Last four results)  BMP  Recent Labs   Lab 10/21/21  1610 10/21/21  1357 10/21/21  1000 10/21/21  0953 10/21/21  0807 10/21/21  0748 10/21/21  0344 10/21/21  0038 10/20/21  2143 10/20/21  2143 10/20/21  1542 10/20/21  1535   NA  --   --   --  141  --  139 139 140   < > 140  140   < > 139   POTASSIUM  --   --   --  4.2  --   --  4.2  --   --  4.2  4.2  --  4.1   CHLORIDE  --   --   --  109  --   --  109  --   --  107  107  --  105   JEANNE  --   --   --  7.9*  --   --  8.2*  --   --  8.1*  8.1*  --  8.4*   CO2  --   --   --  25  --   --  26  --   --  27  27  --  28   BUN  --   --   --  46*  --   --  43*  --   --  43*  43*  --  42*   CR  --   --   --  2.74*  --   --  2.80*  --   --  2.91*  2.91*  --  3.03*   * 124* 118* 124*  123*   < >  --  132*  137*  --    < > 124*  124*  125*   < > 133*  131*    < > = values in this interval not displayed.     CBC  Recent Labs   Lab 10/21/21  1606 10/21/21  0953 10/21/21  0344 10/20/21  2143   WBC 14.8* 16.5* 17.9* 18.7*   RBC 2.86* 2.87* 2.97* 3.07*   HGB 8.8* 8.9* 9.0* 9.4*   HCT 25.9* 25.8* 26.5* 27.5*   MCV 91 90 89 90   MCH 30.8 31.0 30.3 30.6   MCHC 34.0 34.5 34.0 34.2   RDW 13.0 12.9 13.1 13.0   PLT 99* 104* 87* 97*     INR  Recent Labs   Lab 10/21/21  0953 10/21/21  0344 10/20/21  2143 10/20/21  1535   INR 1.50* 1.56* 1.56* 1.54*

## 2021-10-21 NOTE — PROVIDER NOTIFICATION
Called with sodium result of 140. Hand off from previous shift indicated a goal of 145-150 with a plan of increasing 3% from 15 to 20 if necessary. Addendum in progress note states goal is 140 to 145. No orders received.

## 2021-10-21 NOTE — PLAN OF CARE
Major Shift Events     Neuro: Pupils a-symmetrical, L. Pupil 2mm and brisk, R. Pupil 4-5mm and brisk. Flexion posturing to oral suctioning and ET tube suctioning, no sedation, EEG off today. Brain MRI today.     CV: IABP out at 1100. Sinus rhythm to sinus tachycardia 80s-105, MAPs > 65 without pressors. Heparin gtt switched to subcutaneous. Goal of temp <100F.     Resp: VC-AC, 40%, , RR 10, PEEP 5. Lung sounds coarse throughout, sputum culture sent. Moderate amounts of foul smelling white, tan, thick secretions via oral cavity and ET tube.     GI: OG tube feedings at goal of 45 mL/hr, unable to assess bowel sounds, no BM.     /Renal: Anuric. Insulin gtt remains off, CRRT goal of 0-50 mL/hr as  BP tolerates.     Plan: Care conference tomorrow for 1100 to discuss Brain MRI results and plan of care, family updated at bedside.     For vital signs and complete assessments, please see documentation flowsheets.

## 2021-10-21 NOTE — PROGRESS NOTES
EEG CLINICAL NEUROPHYSIOLOGY PRELIMINARY REPORT    There has been progressive decline in amplitude since around noontime EEG recording was resumed. EEG is no longer continuous and a generalized periodic discharge pattern predominates. Findings may be consistent with increasing edema noted on imaging. No seizures.    Discussed with neurocrit. Full report to follow.    Han Aguilar MD

## 2021-10-21 NOTE — PROGRESS NOTES
[facility]  Cardiology ICU Progress Note       Plan for Today:     - Decannulation yesterday, tolerated well  - Successful IABP wean, will plan to pull today  - Maintain sodium 140-145. 3% Saline infusion as needed  - Plan for head MRI this afternoon  - Family conference tomorrow    Assessment and Plan:     Neurology: # Concern for anoxic brain injury  - Initial head CT negative, repeat head CT with diffuse cerebral edema effacing the sulci and partially compressing the ventricles with blurring of the grey-white matter, consistent with anoxic brain injury  - Goal sodium 140-145  - Infusion of 3% Saline as needed to achieve goal sodium  - Intubated, Rewarmed  - Wean sedation as tolerated  - Neuro-crit consulted and appreciate recommendations.   - vEEG   - Palliative care consulted.   - Plan for head MRI this afternoon   Cardiovascular / Hemodynamics: # Refractory VF Cardiac arrest requiring VA ECMO  # NICM - EF 10-20%  Peripheral V-A ECMO inserted via RCFA and RCFV; Successful decannulation on 10/20  TTE: Severe LV Dysfunction, LVEF 10-20%  Coronary Angiogram: No coronary artery disease  - IABP, EKG triggered, 1:1, 100% augmentation; successful wean overnight. Plan to pull IABP today  - continue PO amiodarone   - Continue ASA 81mg  - Hold lipitor for now given shock liver  - Hold ACE/ARB for now given likely reduced renal fxn after arrest  - Holding beta blocker given shock  - US LE w/ arterial duplex reveal monophasic flow with obstructed REINIER bilaterally, CK trending down. Monitor clinical exam  - Cardiac MRI likely tomorrow      Pulmonary: # Acute hypoxemic respiratory failure requiring intubation  # Probable aspiration pneumonia  Vent Settings: CMV/10/480/8/50%   AB.43/39/121/26  ETT in appropriate position .   CXR:   - Wean vent as able  - Daily CXR  - Q2h ABGs   - Consider scheduled duonebs if signs of lung dz, currently PRN     GI and Nutrition: # Shock liver  "2/2 cardiac arrest  No known medical hx.   - Monitor BID LFTs  - Start tube feeds  - Bowel regimen      #GI Prophylaxis: PPI; Protonix 40 mg daily   Renal, Fluid and Electrolytes: # Acute Renal Injury, likely 2/2  ATN  # Hypernatremia  # Hypoalbuminemia  # Lactic acidosis- improving  - On CRRT for hyperkalemia and low UOP  - Net negative 500cc to 1L today if possible  - Appreciate renal support   Infectious Disease: # Aspiration Pneumonia- in the setting of VF arrest.  CXR as above.   # Leukocytosis  Leukocytosis c/w arrest.   - Monitor for signs of infection given cooling, lines, and leukocytosis  - Daily blood cultures.   Cultures thus far: Sputum Cx with GPC, MRSA nares negative                Antibiotics               - Vancomycin 10/17 - 10/18                - Zosyn 10/17 - present    Hematology and Oncology: # Anemia of critical illness  Hgb and platelets trending down. No e/o bleeding.  - Receiving heparin for ECMO with ACT goal 180-200   - PRBC for hbg < 8  - platelet for plt < 50  - FFP for INR > 2  - Cryoprecipitate for fibrinogen < 150  - DVT PPX: Heparin as above      Endocrinology: # Hyperglycemia   - Insulin gtt as needed  - Hgb a1c 5.5   Lines: L femoral arterial line October 17, 2021  L Femoral Thermogaurd  R radial arterial line October 17, 2021  ETT October 17, 2021  OG tube October 17, 2021  Current lines are required for patient management       Family update by me today: Yes      Code Status: FULL     Patient seen and discussed with staff physician, Dr. James Monterroso MD   Cardiology Fellow  [phone]    Interval Events:  SHIELA overnight. Successful decannulation yesterday.    Objective:  Most recent vital signs:  Pulse 98   Temp 98.2  F (36.8  C) (Oral)   Resp 20   Ht 1.829 m (6' 0.01\")   Wt 107.4 kg (236 lb 12.4 oz)   SpO2 100%   BMI 32.11 kg/m    Temp:  [97.9  F (36.6  C)-99.6  F (37.6  C)] 98.2  F (36.8  C)  Pulse:  [] 98  Resp:  [13-23] 20  MAP:  [62 mmHg-172 mmHg] " 86 mmHg  Arterial Line BP: ()/() 141/42  FiO2 (%):  [40 %] 40 %  SpO2:  [97 %-100 %] 100 %  Wt Readings from Last 2 Encounters:   10/21/21 107.4 kg (236 lb 12.4 oz)   10/17/21 112.5 kg (248 lb 0.3 oz)       Intake/Output Summary (Last 24 hours) at 10/21/2021 0842  Last data filed at 10/21/2021 0700  Gross per 24 hour   Intake 1898.61 ml   Output 2225 ml   Net -326.39 ml     Physical exam:   General: In bed, intubated,  HEENT: PERRL. No scleral icterus or injection  CARDIAC: RRR, no m/r/g appreciated. Balloon pump ascultated  RESP: Mechanical breath sounds, no wheezes, rhonchi or crackles appreciated anteriorly  GI: NABS, NT/ND, no guarding or rebound  EXTREMITIES: NO LE edema, cool extremities. Dusky top and bottom of right foot. Pulses not palpable bilaterally.  Femoral access sites w/o bleeding, dressing c/d/i. No bruits appreciated.   SKIN: No acute lesions appreciated  NEURO: Intubated, sedated    Labs (Past three days):  CBC  Recent Labs   Lab 10/21/21  0344 10/20/21  2143 10/20/21  1535 10/20/21  1127   WBC 17.9* 18.7* 19.0* 16.7*   RBC 2.97* 3.07* 3.18* 3.44*   HGB 9.0* 9.4* 9.7* 10.6*   HCT 26.5* 27.5* 28.4* 31.0*   MCV 89 90 89 90   MCH 30.3 30.6 30.5 30.8   MCHC 34.0 34.2 34.2 34.2   RDW 13.1 13.0 13.0 13.2   PLT 87* 97* 96* 106*     BMP  Recent Labs   Lab 10/21/21  0807 10/21/21  0748 10/21/21  0344 10/21/21  0038 10/20/21  2143 10/20/21  1542 10/20/21  1535 10/20/21  1128 10/20/21  1127   NA  --  139 139 140 140  140   < > 139   < > 139  139   POTASSIUM  --   --  4.2  --  4.2  4.2  --  4.1  --  3.9  3.9   CHLORIDE  --   --  109  --  107  107  --  105  --  104  104   CO2  --   --  26  --  27  27  --  28  --  27  27   ANIONGAP  --   --  4  --  6  6  --  6  --  8  8   *  --  132*  137*  --  124*  124*  125*   < > 133*  131*   < > 156*  156*   BUN  --   --  43*  --  43*  43*  --  42*  --  43*  43*   CR  --   --  2.80*  --  2.91*  2.91*  --  3.03*  --  3.29*  3.29*    GFRESTIMATED  --   --  23*  --  22*  22*  --  21*  --  19*  19*   JEANNE  --   --  8.2*  --  8.1*  8.1*  --  8.4*  --  8.5  8.5   MAG  --   --  2.5*  --  2.4*  --  2.3  --  2.3   PHOS  --   --  4.1  --  3.5  3.5  --  3.4  --  4.5    < > = values in this interval not displayed.     Troponins:     INR  Recent Labs   Lab 10/21/21  0344 10/20/21  2143 10/20/21  1535 10/20/21  1127   INR 1.56* 1.56* 1.54* 1.63*     Liver panel  Recent Labs   Lab 10/21/21  0344 10/20/21  2143 10/20/21  1535 10/20/21  1127   PROTTOTAL 5.4* 5.5* 5.4* 5.4*   ALBUMIN 2.2* 2.2*  2.2* 2.2* 2.4*  2.4*   BILITOTAL 0.5 0.6 0.6 0.8   ALKPHOS 59 62 60 64   * 212* 225* 252*   * 187* 200* 215*

## 2021-10-21 NOTE — PROGRESS NOTES
[facility]  Neurocritical Care Progress Note    Patient Name:  Shin Smith  MRN:  [id number]    :  1955  Date of Service:  10/21/2021  Date of Admission:  10/17/2021  Hospital Day: 5     Assessment & Plan   Shin Smith is a 66-year-old male who was admitted following Vfib arrest on VA ECMO. The neurocritical care service as asked to evaluate for post-cardiac arrest neuroprognostication. Since admission, the patient has been rewarmed and on fentanyl and midazolam gtts. Head CT was obtained on admission and showed good grey-white differentiation without significant swelling or edema. Repeat head CT on 10/21 showed cerebral edema with loss of grey-white differentiation. EEG has showed progressive decline in amplitude after returning from decannulation on 10/20. It now shows generalized periodic discharges with significant attenuation. He currently remains off sedation and has a limited examination. Given these findings, we recommend obtaining brain MRI to help with neuroprognostication.    -Continuous vEEG  -Brain MRI, we will follow results  -Limit sedating medications  -Avoid hypotonic solutions as they can worsen cerebral edema  -Neurocrit will follow along    The patient was seen and discussed with the NCC attending, Dr. Atkinson.    Patricia Garay MD  Neurology PGY-3  10/21/2021 7:08 AM     Interval History/24-hour events   Nursing notes reviewed.    Remains off sedation. Started on 3% to maintain sodium goal 140-145.     Objective   Temp:  [97.9  F (36.6  C)-99.4  F (37.4  C)] 99.4  F (37.4  C)  Pulse:  [] 94  Resp:  [14-25] 24  MAP:  [62 mmHg-99 mmHg] 79 mmHg  Arterial Line BP: ()/(36-64) 126/58  FiO2 (%):  [40 %] 40 %  SpO2:  [98 %-100 %] 100 %    Ventilation Mode: CMV/AC  (Continuous Mandatory Ventilation/ Assist Control)  FiO2 (%): 40 %  Rate Set (breaths/minute): 10 breaths/min  Tidal Volume Set (mL): 480 mL  PEEP (cm H2O): 10 cmH2O  Oxygen  Concentration (%): 40 %  Resp: 24    I/O last 3 completed shifts:  In: 1988.11 [I.V.:1098.11; NG/GT:450]  Out: 2211 [Other:2161; Blood:50]    Physical Exam  General: Laying in hospital bed, no acute distress.  CV: Extremities appear well perfused.  Resp: On mechanical ventilation. No accessory muscle use.  GI: Soft, non-distended.  Extremities: No edema. Pedal pulses palpable.  Skin: Warm, dry, no jaundice.    Neuro:  MSE: Eyes are open, but the patient is not regarding/tracking. He does not follow simple commands.   CN: Left pupil is 2mm and R pupil is 4mm - both are round, reactive to light. Cough reflex intact. Unable to assess facial symmetry while the patient is intubated.  Motor/Sensory: There is extensor posturing in the LUE to noxious stimulus. No movement to noxious stimuli in the remainder of the extremities.  Reflexes: no clonus, toes mute.  Gait: Deferred.     Labs and Imaging   BMP  Recent Labs   Lab 10/21/21  0953 10/21/21  0748 10/21/21  0344 10/21/21  0038 10/20/21  2143 10/20/21  2143 10/20/21  1955 10/20/21  1535    139 139 140   < > 140  140   < > 139   POTASSIUM 4.2  --  4.2  --   --  4.2  4.2  --  4.1   CHLORIDE 109  --  109  --   --  107  107  --  105   CO2 25  --  26  --   --  27  27  --  28   BUN 46*  --  43*  --   --  43*  43*  --  42*   CR 2.74*  --  2.80*  --   --  2.91*  2.91*  --  3.03*   JEANNE 7.9*  --  8.2*  --   --  8.1*  8.1*  --  8.4*    < > = values in this interval not displayed.     CBC  Recent Labs   Lab 10/21/21  0953 10/21/21  0344 10/20/21  2143 10/20/21  1535   WBC 16.5* 17.9* 18.7* 19.0*   HGB 8.9* 9.0* 9.4* 9.7*   * 87* 97* 96*     COAGS  Recent Labs   Lab 10/21/21  0953 10/21/21  0344 10/20/21  2143 10/20/21  1535 10/20/21  1127 10/20/21  0340 10/19/21  2153 10/19/21  1548 10/19/21  1010 10/19/21  0352 10/18/21  2146 10/18/21  1547   INR 1.50* 1.56* 1.56* 1.54*   < > 1.65*   < > 1.75*   < > 1.63*   < > 1.60*   PTT 49* 126* 61* 31   < > 133*   < > 127*    < > 96*   < > 167*   FIBR  --   --   --   --   --  534*  --  466  --  418  --  322    < > = values in this interval not displayed.     ABG  Recent Labs   Lab 10/21/21  0748 10/20/21  1536 10/20/21  1128 10/20/21  1022   PH 7.43 7.43 7.35 7.45   PCO2 39 43 51* 39   PO2 121* 113* 80 130*   HCO3 26 28 28 27     IMAGING:   Personally reviewed. The radiologists interpretation is documented below.    Head CT without contrast 10/20/2021:  Impression:  1. Diffuse cerebral edema effacing the sulci and partially compressing  the ventricles with blurring of the gray-white matter consistent with  anoxic brain injury. No intracranial hemorrhage or herniation.  2. No hemorrhage.

## 2021-10-21 NOTE — PROGRESS NOTES
EEG CLINICAL NEUROPHYSIOLOGY PRELIMINARY REPORT    Video-EEG through 0600 today reviewed. As best as can be determined from EMR, patient not currently treated with anesthetic drips. Generalized periodic discharges are predominant pattern with significant attenuation of background. At times runs of diffuse delta alternating with significant periods of suppression.     Study continues consistent with severe diffuse encephalopathy. No changes since yesterday afternoon. No electrographic seizures. Full report in chart.    Han Aguilar MD  Contact information for physicians covering Epilepsy and EEG is available on [facility].  Click search, enter neurology adult/[facility] in group name box, click on neurology adult/[facility], then click Staff Epilepsy and EEG.

## 2021-10-22 NOTE — PROGRESS NOTES
"CRRT STATUS NOTE    DATA:  Time:  6:23 AM  Pressures WNL:  YES  Filter Status:  WDL    Problems Reported/Alarms Noted:  None    Supplies Present:  YES    ASSESSMENT:  Patient Net Fluid Balance:  -569ml since midnight. -577ml yesterday  Vital Signs:  Pulse 82   Temp 98.8  F (37.1  C) (Axillary)   Resp 23   Ht 1.829 m (6' 0.01\")   Wt 107.4 kg (236 lb 12.4 oz)   SpO2 100%   BMI 32.11 kg/m      Labs:  K+ 4.1. Elevated LFTs WBC 16.1.Hgb 8.9  Goals of Therapy:  I=O    INTERVENTIONS:   None    PLAN:  Continue with POC. Contact CRRT RN with concerns     "

## 2021-10-22 NOTE — PROGRESS NOTES
CRRT STATUS NOTE    DATA:  Time:  5:38 PM  Pressures WNL:  YES  Filter Status:  CRRT Filter Status:WDL    Problems Reported/Alarms Noted:  none    Supplies Present:  YES    ASSESSMENT:  Patient Net Fluid Balance:  Net negative 2 liters today  Vital Signs:  T 99.3 HR 85 RR23 /54 MAP79  Labs:  K4.1  Goals of Therapy: intake=output    INTERVENTIONS:   Discussed goals of care with bedside RN    PLAN:  Continue plan of care

## 2021-10-22 NOTE — PLAN OF CARE
Major Shift Events:  No major events overnight. Pupils unequal and brisk. L>R. Spontaneous cough, Pt. appears to have decorticate posturing with stimulation. SR-ST 80's to low 100'S/ Mildly hypertensive with SBP in the 140's to 160's occasionally higher with stimulation. Increased sputum via ETT. No changes to vent.  No BM. TF at goal with standard free water flush. CRRT ongoing able to achieve goals.   Plan: Continue plan of care. Care conference today to determine goals of care.  For vital signs and complete assessments, please see documentation flowsheets.

## 2021-10-22 NOTE — PROGRESS NOTES
"  Nephrology Progress Note  10/22/2021         Assessment & Recommendations:   Shin Smith is a 66 year old year old male admitted with cardiogenic shock after V. fib arrest.  He received cardioversion and then underwent targeted temperature management.  He was noted to have hyperkalemia even prior to reinvolvement and was initiated on CRRT.  Also developed oliguric acute kidney injury.      # Oliguric acute kidney injury, likely secondary acute tubular injury in setting of cardiogenic shock  # Hyperkalemia   # V Fib arrest with ROSC, s/p targeted temp mgmt  # Concerns for anoxic brain injury     Plan:   - 4 K bath, UF-fluid removal to maintain net even  - Strict Intake, output monitoring  - Daily weight checks  - Renally dose all medications      # Cardiogenic shock, on VA ECMO, IABP and vasopressor support  # Shock liver     Recommendations were communicated to primary team via note    Seen and discussed with Dr. Ryan Amador MD   [phone]    Interval History :   Nursing and provider notes from last 24 hours reviewed.    PEEP 5, FiO2 30%.  Not on vasopressors, MAP greater than 65    Review of Systems:   Unable to obtain as intubated    Physical Exam:   I/O last 3 completed shifts:  In: 3259 [I.V.:1719; NG/GT:570]  Out: 4611 [Other:4611]   Pulse 90   Temp 99.7  F (37.6  C) (Axillary)   Resp 25   Ht 1.829 m (6' 0.01\")   Wt 107.4 kg (236 lb 12.4 oz)   SpO2 98%   BMI 32.11 kg/m       GENERAL APPEARANCE: Intubated and sedated  EYES:  no scleral icterus, pupils equal  HENT: mouth without ulcers or lesions  PULM: lungs conducted air sounds, equal air movement, no clubbing  CV: regular rhythm, normal rate, no rub     -JVD not distented     -edema absent   GI: soft, not-stended, bowel sounds are +  INTEGUMENT: purplish discoloration of toes, no rash      Labs:   All labs reviewed by me  Electrolytes/Renal -   Recent Labs   Lab Test 10/22/21  1156 10/22/21  0952 10/22/21  0758 10/22/21  0757 " 10/22/21  0339 10/22/21  0338 10/21/21  1610 10/21/21  1606    143 142   < > 142  --    < > 141  141   POTASSIUM 4.0 4.1  --   --  4.1  --    < > 4.2  4.2   CHLORIDE 110* 110*  --   --  111*  --    < > 109  109   CO2 23 24  --   --  23  --    < > 23  23   BUN 56* 52*  --   --  55*  --    < > 52*  52*   CR 2.47* 2.55*  --   --  2.63*  --    < > 3.06*  3.06*   * 114*  120*  --    < > 145*   < >   < > 126*  126*  127*   JEANNE 7.8* 8.1*  --   --  7.8*  --    < > 8.0*  8.0*   MAG 2.7* 2.5*  --   --  2.6*  --    < > 2.7*   PHOS 4.3  --   --   --  4.4  --   --  4.5    < > = values in this interval not displayed.       CBC -   Recent Labs   Lab Test 10/22/21  1156 10/22/21  0952 10/22/21  0338   WBC 14.5* 16.3* 16.1*   HGB 8.6* 9.0* 8.9*   * 115* 112*       LFTs -   Recent Labs   Lab Test 10/22/21  1156 10/22/21  0952 10/22/21  0339 10/21/21  2222 10/21/21  2222   ALKPHOS  --  79 78  --  75   BILITOTAL  --  0.5 0.4  --  0.5   ALT  --  123* 132*  --  142*   AST  --  131* 148*  --  152*   PROTTOTAL  --  6.2* 6.1*  --  5.9*   ALBUMIN 2.4* 2.3* 2.4*   < > 2.2*    < > = values in this interval not displayed.       Iron Panel - No lab results found.      Imaging:  All imaging studies reviewed by me.     Current Medications:    acetaminophen  650 mg Oral Q6H     amiodarone  400 mg Oral or Feeding Tube BID     aspirin  81 mg Per Feeding Tube Daily     B and C vitamin Complex with folic acid  5 mL Per Feeding Tube Daily     carvedilol  6.25 mg Oral or Feeding Tube BID     chlorhexidine  15 mL Swish & Spit BID     heparin ANTICOAGULANT  5,000 Units Subcutaneous Q12H     pantoprazole  40 mg Oral or Feeding Tube Daily     piperacillin-tazobactam  4.5 g Intravenous Q6H     polyethylene glycol  17 g Oral or Feeding Tube Daily     protein modular  2 packet Per Feeding Tube TID     senna-docusate  1 tablet Oral or Feeding Tube BID     vancomycin  1,750 mg (central catheter) Intravenous Q24H       dextrose        CRRT replacement solution 12.5 mL/kg/hr (10/22/21 1426)     - MEDICATION INSTRUCTIONS -       CRRT replacement solution 200 mL/hr at 10/21/21 1555     CRRT replacement solution 12.5 mL/kg/hr (10/22/21 1423)     sodium chloride 3% 30 mL/hr at 10/22/21 1100     Blair Amador MD

## 2021-10-22 NOTE — PROGRESS NOTES
[facility]  Cardiology ICU Progress Note    Plan for Today:     - MRI yesterday with signs of anoxic brain injury  - Plan for family conference today  - Maintain sodium 140-145. 3% Saline infusion as needed  - Start Coreg 6.25mg BID  - Continue vanc and zosyn for now, plan for 10 day course     Assessment and Plan:     Neurology: # Concern for anoxic brain injury  - Initial head CT negative, repeat head CT with diffuse cerebral edema effacing the sulci and partially compressing the ventricles with blurring of the grey-white matter, consistent with anoxic brain injury. MRI on 10/22 confirmed anoxic brain injury.  - Goal sodium 140-145  - Infusion of 3% Saline as needed to achieve goal sodium  - Intubated, Rewarmed  - Wean sedation as tolerated  - Neuro-crit consulted and appreciate recommendations.   - vEEG   - Palliative care consulted.    Cardiovascular / Hemodynamics: # Refractory VF Cardiac arrest requiring VA ECMO  # NICM - EF 10-20%  Peripheral V-A ECMO inserted via RCFA and RCFV; Successful decannulation on 10/20  TTE: Severe LV Dysfunction, LVEF 10-20%  Coronary Angiogram: No coronary artery disease  - IABP, EKG triggered, 1:1, 100% augmentation; successful wean overnight. Plan to pull IABP today  - continue PO amiodarone   - Continue ASA 81mg  - Hold lipitor for now given shock liver  - Hold ACE/ARB for now given likely reduced renal fxn after arrest  - Holding beta blocker given shock  - US LE w/ arterial duplex reveal monophasic flow with obstructed REINIER bilaterally, CK trending down. Monitor clinical exam  - Start PO Hydralazine for elevated blood pressure      Pulmonary: # Acute hypoxemic respiratory failure requiring intubation  # Probable aspiration pneumonia  Vent Settings: CMV/10/480/8/40%   AB.44/34/144/23  ETT in appropriate position .   CXR:   - Wean vent as able  - Daily CXR  - Q2h ABGs   - Consider scheduled duonebs if signs of lung dz,  "currently PRN     GI and Nutrition: # Shock liver 2/2 cardiac arrest  No known medical hx.   - Monitor BID LFTs  - Start tube feeds  - Bowel regimen      #GI Prophylaxis: PPI; Protonix 40 mg daily   Renal, Fluid and Electrolytes: # Acute Renal Injury, likely 2/2  ATN  # Hypernatremia  # Hypoalbuminemia  # Lactic acidosis- improving  - On CRRT for hyperkalemia and low UOP  - Net negative 500cc to 1L today if possible  - Appreciate renal support   Infectious Disease: # Aspiration Pneumonia- in the setting of VF arrest.  CXR as above.   # Leukocytosis  Leukocytosis c/w arrest. Increase secretions from ET tube  - Monitor for signs of infection given cooling, lines, and leukocytosis  - Daily blood cultures.   Cultures thus far: Sputum Cx with GPC, MRSA nares negative x2                Antibiotics               - Vancomycin 10/17 - 10/18, 10/21-present               - Zosyn 10/17 - present    Hematology and Oncology: # Anemia of critical illness  Hgb and platelets trending down. No e/o bleeding.  - Receiving heparin for ECMO with ACT goal 180-200   - PRBC for hbg < 8  - platelet for plt < 50  - FFP for INR > 2  - Cryoprecipitate for fibrinogen < 150  - DVT PPX: Heparin as above      Endocrinology: # Hyperglycemia   - Insulin gtt as needed  - Hgb a1c 5.5   Lines: L femoral arterial line October 17, 2021  L Femoral Thermogaurd  R radial arterial line October 17, 2021  ETT October 17, 2021  OG tube October 17, 2021  Current lines are required for patient management       Family update by me today: Yes      Code Status: FULL    Patient seen and discussed with staff physician, Dr. Ramirez.    Fawad Monterroso MD   Cardiology Fellow  [phone]    Interval Events:  SHIELA overnight.     Objective:  Most recent vital signs:  Pulse 90   Temp 98.8  F (37.1  C) (Axillary)   Resp 23   Ht 1.829 m (6' 0.01\")   Wt 107.4 kg (236 lb 12.4 oz)   SpO2 100%   BMI 32.11 kg/m    Temp:  [98.2  F (36.8  C)-100.1  F (37.8  C)] 98.8  F (37.1 "  C)  Pulse:  [] 90  Resp:  [20-29] 23  MAP:  [73 mmHg-110 mmHg] 89 mmHg  Arterial Line BP: (102-171)/(38-80) 147/63  FiO2 (%):  [40 %] 40 %  SpO2:  [97 %-100 %] 100 %  Wt Readings from Last 2 Encounters:   10/21/21 107.4 kg (236 lb 12.4 oz)   10/17/21 112.5 kg (248 lb 0.3 oz)       Intake/Output Summary (Last 24 hours) at 10/22/2021 0717  Last data filed at 10/22/2021 0700  Gross per 24 hour   Intake 3633.25 ml   Output 4466 ml   Net -832.75 ml     Physical exam:  General: In bed, intubated,  HEENT: PERRL. No scleral icterus or injection  CARDIAC: RRR, no m/r/g appreciated. Balloon pump ascultated  RESP: Mechanical breath sounds, no wheezes, rhonchi or crackles appreciated anteriorly  GI: NABS, NT/ND, no guarding or rebound  EXTREMITIES: NO LE edema, cool extremities. Dusky top and bottom of right foot. Pulses not palpable bilaterally.  Femoral access sites w/o bleeding, dressing c/d/i. No bruits appreciated.   SKIN: No acute lesions appreciated  NEURO: Intubated, non responsive to verbal or physical stimuli.    Labs (Past three days):  CBC  Recent Labs   Lab 10/22/21  0338 10/21/21  2222 10/21/21  1606 10/21/21  0953   WBC 16.1* 15.9* 14.8* 16.5*   RBC 2.84* 2.87* 2.86* 2.87*   HGB 8.9* 8.7* 8.8* 8.9*   HCT 25.9* 25.7* 25.9* 25.8*   MCV 91 90 91 90   MCH 31.3 30.3 30.8 31.0   MCHC 34.4 33.9 34.0 34.5   RDW 13.0 13.1 13.0 12.9   * 109* 99* 104*     BMP  Recent Labs   Lab 10/22/21  0339 10/22/21  0338 10/21/21  2353 10/21/21  2223 10/21/21  2222 10/21/21  1610 10/21/21  1606 10/21/21  1000 10/21/21  0953 10/21/21  0748 10/21/21  0344     --  141  --  142  --  141  141   < > 141   < > 139   POTASSIUM 4.1  --   --   --  4.2  --  4.2  4.2  --  4.2   < > 4.2   CHLORIDE 111*  --   --   --  111*  --  109  109  --  109   < > 109   CO2 23  --   --   --  24  --  23  23  --  25   < > 26   ANIONGAP 8  --   --   --  7  --  9  9  --  7   < > 4   * 147*  --  131* 133*   < > 126*  126*  127*   < >  124*  123*   < > 132*  137*   BUN 55*  --   --   --  58*  --  52*  52*  --  46*   < > 43*   CR 2.63*  --   --   --  2.86*  --  3.06*  3.06*  --  2.74*   < > 2.80*   GFRESTIMATED 25*  --   --   --  22*  --  20*  20*  --  23*   < > 23*   JEANNE 7.8*  --   --   --  7.7*  --  8.0*  8.0*  --  7.9*   < > 8.2*   MAG 2.6*  --   --   --  2.7*  --  2.7*  --  2.7*   < > 2.5*   PHOS 4.4  --   --   --   --   --  4.5  --  4.0  --  4.1    < > = values in this interval not displayed.     Troponins:     INR  Recent Labs   Lab 10/22/21  0339 10/21/21  2222 10/21/21  1606 10/21/21  0953   INR 1.33* 1.35* 1.41* 1.50*     Liver panel  Recent Labs   Lab 10/22/21  0339 10/21/21  2222 10/21/21  1606 10/21/21  0953   PROTTOTAL 6.1* 5.9* 5.6* 5.7*   ALBUMIN 2.4* 2.2* 2.4*  2.4* 2.2*   BILITOTAL 0.4 0.5 0.5 0.5   ALKPHOS 78 75 62 66   * 152* 168* 191*   * 142* 146* 165*

## 2021-10-22 NOTE — PROGRESS NOTES
[facility]  Cardiology ICU Progress Note 10/23/2021    Plan for Today:    -we will continue coreg for now. But it would be discontinued if there is any sign of hypotension  -ongoing conversations with family about transitioning patient to comfort cares. Martina (wife) said that she doesn't want to take this decision until next Tuesday (one day after her birthday)   -discontinue vancomycin and zosyn  - Maintain sodium 140-145. 3% Saline infusion as needed     Assessment and Plan:    67 yo M without sig pmhx who was found to be gurgling and incontinent in his sleep by his wife who called EMS. Received some CPR and was found to be on ventricular fibrillation by EMS s/p shocks X3 amio and epi with ROSC.  Was emergently VA ECMO cannulated and transfer to the [facility] cath lab which showed non obstructive CAD.  Patient was de-cannulated on 10/20. Unfortunately extensive brain work-up has demonstrated severe anoxic brain injury. Planning to transfer to comfort cares in the upcoming days once family feels ready.       Neurology  1. Concern for anoxic brain injury: Extensive work up including brain MRI which showed evidence of diffuse anoxic brain injury. Continuous EEG monitoring showed continuous activity with severe diffuse encephalopathy and generalized cortical irritability. Given the aformentioned findings NCC stated that prognosis from a neurologic perspective is low.    Cardiovascular / Hemodynamics  1. Refractory VF Cardiac arrest requiring VA ECMO (successful decannulation on 10/20)  - continue PO amiodarone     2. NICM - EF 10-20%: Most recent echo with severe left ventricular dysfunction with ejection fraction 10-20%.  Coronary angiography without evidence of coronary artery disease.  On 10/21 intra-aortic balloon pump was removed.   - Continue ASA 81mg   - Carvedilol 6.25 mg BID. Will consider to discontinue if he starts having low BP    Pulmonary  1. Acute hypoxemic  "respiratory failure requiring intubation  - Daily CXR  - Q2h ABGs   - Consider scheduled duonebs if signs of lung dz, currently PRN     GI   1. Nutrition   - Monitor BID LFTs   - Start tube feeds   - Bowel regimen     2. Shock liver 2/2 cardiac arrest, resolved    Renal, Fluid and Electrolytes  1.  Acute Renal Injury, likely 2/2  ATN   - On CRRT   - will try to keep him in the lower levels of euvolemia   - Appreciate renal support    2.  Hypernatremia   -On 3% saline infusion     4. Hyperkalemia, stable    -managed with CRRT     5. Hypoalbuminemia, unchanged  6. Lactic acidosis, resolved     Infectious Disease  1. Aspiration Pneumonia, resolved. on zosyn and vancomycin from (10/17-10/23)   2.  Leukocytosis, stable. No signs of active ongoing infectious process. Although he has necrotic lesions in both feet that might put him at high risk of infectious process.     Hematology and Oncology  1. Anemia of critical illness, stable   Hgb and platelets stable. No e/o bleeding.    Endocrinology  1. Hyperglycemia, well controlled on insulin drip  - Insulin gtt as needed  - Hgb a1c 5.5    Lines:  -art line 10/17  -right upper arm peripheral IV 10/17    Code Status: DNR    Patient seen and discussed with staff physician, Dr. Ramirez.    Jorge L Reyes Castro, MD, MS  Cardiology Fellow      Interval Events:  No acute events overnight. Hemodynamics have remained stable and off pressors.     Objective:  Most recent vital signs:  Pulse 88   Temp 99.7  F (37.6  C) (Axillary)   Resp 27   Ht 1.829 m (6' 0.01\")   Wt 107.4 kg (236 lb 12.4 oz)   SpO2 93%   BMI 32.11 kg/m    Temp:  [98.7  F (37.1  C)-100.1  F (37.8  C)] 99.7  F (37.6  C)  Pulse:  [] 88  Resp:  [22-29] 27  MAP:  [74 mmHg-110 mmHg] 75 mmHg  Arterial Line BP: (121-171)/(49-80) 136/49  FiO2 (%):  [30 %-40 %] 30 %  SpO2:  [93 %-100 %] 93 %  Wt Readings from Last 2 Encounters:   10/21/21 107.4 kg (236 lb 12.4 oz)   10/17/21 112.5 kg (248 lb 0.3 oz) "       Intake/Output Summary (Last 24 hours) at 10/22/2021 0717  Last data filed at 10/22/2021 0700  Gross per 24 hour   Intake 3633.25 ml   Output 4466 ml   Net -832.75 ml     Physical exam:  General: sedated and intubated.   HEENT: PERRL. No scleral icterus or injection  CARDIAC: heart sounds regular and rhythmic. No murmurs.   RESP: Passive movement of air.   GI: NABS.   EXTREMITIES: NO LE edema. Feels cool to touch. Dusky top and bottom of right foot.    bilaterally.  Femoral access sites w/o bleeding, dressing c/d/i. No bruits appreciated.   SKIN: No acute lesions appreciated    Labs (Past three days):  CBC  Recent Labs   Lab 10/22/21  1156 10/22/21  0952 10/22/21  0338 10/21/21  2222   WBC 14.5* 16.3* 16.1* 15.9*   RBC 2.86* 2.90* 2.84* 2.87*   HGB 8.6* 9.0* 8.9* 8.7*   HCT 25.7* 26.0* 25.9* 25.7*   MCV 90 90 91 90   MCH 30.1 31.0 31.3 30.3   MCHC 33.5 34.6 34.4 33.9   RDW 13.1 13.1 13.0 13.1   * 115* 112* 109*     BMP  Recent Labs   Lab 10/22/21  1156 10/22/21  0952 10/22/21  0758 10/22/21  0757 10/22/21  0339 10/21/21  2223 10/21/21  2222 10/21/21  1610 10/21/21  1606 10/21/21  1000 10/21/21  0953    143 142  --  142   < > 142   < > 141  141   < > 141   POTASSIUM 4.0 4.1  --   --  4.1  --  4.2   < > 4.2  4.2   < > 4.2   CHLORIDE 110* 110*  --   --  111*  --  111*   < > 109  109   < > 109   CO2 23 24  --   --  23  --  24   < > 23  23   < > 25   ANIONGAP 8 9  --   --  8  --  7   < > 9  9   < > 7   * 114*  120*  --  121* 145*   < > 133*   < > 126*  126*  127*   < > 124*  123*   BUN 56* 52*  --   --  55*  --  58*   < > 52*  52*   < > 46*   CR 2.47* 2.55*  --   --  2.63*  --  2.86*   < > 3.06*  3.06*   < > 2.74*   GFRESTIMATED 27* 26*  --   --  25*  --  22*   < > 20*  20*   < > 23*   JEANNE 7.8* 8.1*  --   --  7.8*  --  7.7*   < > 8.0*  8.0*   < > 7.9*   MAG 2.7* 2.5*  --   --  2.6*  --  2.7*   < > 2.7*   < > 2.7*   PHOS 4.3  --   --   --  4.4  --   --   --  4.5  --  4.0    < > =  values in this interval not displayed.     Troponins:     INR  Recent Labs   Lab 10/22/21  0952 10/22/21  0339 10/21/21  2222 10/21/21  1606   INR 1.35* 1.33* 1.35* 1.41*     Liver panel  Recent Labs   Lab 10/22/21  1156 10/22/21  0952 10/22/21  0339 10/21/21  2222 10/21/21  1606 10/21/21  1606   PROTTOTAL  --  6.2* 6.1* 5.9*  --  5.6*   ALBUMIN 2.4* 2.3* 2.4* 2.2*   < > 2.4*  2.4*   BILITOTAL  --  0.5 0.4 0.5  --  0.5   ALKPHOS  --  79 78 75  --  62   AST  --  131* 148* 152*  --  168*   ALT  --  123* 132* 142*  --  146*    < > = values in this interval not displayed.

## 2021-10-22 NOTE — PROGRESS NOTES
Brief Neurocritical Care Note  Care conference with family and provider teams today.     The patient was admitted following Vfib arrest and decannulated from VA ECMO on 10/20. The neurocritical care team has been following for neuroprognostication and obtaining neurologic examinations on the patient since admission. Most recent examination showed asymmetric and reactive pupils without purposeful movements in all extremities.    Imaging and work-up has included brain MRI with diffuse anoxic brain injury. Continuous EEG monitoring showed continuous activity with severe diffuse encephalopathy and generalized cortical irritability. There was then a progressive decline in amplitude on EEG and generalized periodic discharges and attenuated background.     Given these findings, prognosis from a neurologic standpoint is low. These findings and impression were discussed with the care team and the patient's family at today's care conference.    Discussed with the NCC attending, Dr. Atkinson.    Patricia Garay MD  Neurology PGY-3  10/22/2021 12:44PM

## 2021-10-22 NOTE — PLAN OF CARE
Major Shift Events: Care conference at 1100, code status changed to DNR/DNI. Neuro status unchanged. Pt unresponsive, R>L pupils, flexion posturing w/ stimulation. SR. Vent settings CMV 30/480/10/5, thick creamy tan secretions. Bladder scanned for 475, SIC for 600. Loose/watery incontinent stools, rectal pouch placed. CRRT goal changed to I=O, filter clotted @ 1758.   Plan: Continue to monitor neuro status, I=O for CRRT once restarted. If family chooses comfort cares Nurse Manager and ANS have approved all family members to come tomorrow in groups of 6.   For vital signs and complete assessments, please see documentation flowsheets.

## 2021-10-22 NOTE — PROGRESS NOTES
"[facility]  Palliative Care Daily Progress Note       Recommendations/discussion & Counseling     Patient seen and examined. I spoke with primary team, ICU RN.  Planned care conference today for goals of care in setting of MRI and clinical evidence of anoxic brain injury. See notes below.  From a cardiac POV he was successfully decannulated 10/20, off sedation as of am of 10/19 and IABP support discontinued 10/21.  PC has followed closely offered empathetic listening and presence in the setting of  Clinical/ imaging findings. Allyson has good family support.   Goals were restorative but need to be re-addressed in setting of  worsening prognosis.  Full code -  addressed 10/22- changed to DNR/DNI - (no healthcare directives are completed)  Patient spouse indicates that the never discussed limited treatment plans only that \"he would not want to be a vegetable.\"      Assessments          Shin Smith is a 66 year old male without known significant prior medical history who was found to have change in his sleep (sonorous respirations and urinary incontinence) followed by unresponsiveness by his wife who performed CPR and called EMS -was found to be in VF s/p shocks X3 amio and epi with ROSC-  presented to [facility] with recurrent VF arrest and intermittent ROSC with underlying cardiogenic shock- cannulated emergently on [facility] ECMO --> [facility] cath lab --> no significant coronary disease. Unclear down time.     Head CT imaging (10/20)  led to MRI imaging (10/21):    1. Diffuse cortical infarcts in the cerebrum greater than the  cerebellum likely representing diffuse hypoxic ischemic injury.   2. Diffuse cerebral and cerebellar foci of susceptibility suggestive  of multifocal microhemorrhage which could be secondary to amyloid  angiopathy or possibly coagulopathy.    Today,10/22 the patient was seen for PC follow up support 2/2 sequelae post cardiac arrest    Prognosis, Goals, " or Advance Care Planning was addressed today with: No.  Mood, coping, and/or meaning in the context of serious illness were addressed today: No.  Summary/Comments: see consult note dated 10/18   Lyle MONTANO NP  Nurse Practitioner- Lead Advanced Practice Provider  [facility] Palliative Medicine Consult Service   [phone]  TT spent: 65 minutes of which 55 minutes were spent in direct face to face contact with patient/family. Greater than 50% of time spent counseling and/or coordinating care.          Interval History:     Chart review/discussion with unit or clinical team members:   Decannulation from ECMO and discontinued IABP. Head imaging- as above- is concerning. Off sedation as of am of 10/19.   CARE CONFERENCE   IN: 1050      OUT:1140   Staff present: CSI attending and fellow MD, palliative care  and NP, Neurocritical care MD, Unit RNCC and LICSWs  Family present: spouse and her sister- in person. Pt brother and S-I-L,and niece via phone.  Following introductions, patient's present clinical course was outlined.  Cardiology MDs outlining recovery of cardiac function after initial ventricular fibrillation arrest.  He has been able to wean off of ECMO support as well as intra-aortic balloon support.  He is not had any sedating medications since 10/19/2021.  Unfortunately in terms of brain recovery serial CT imaging and MRI imaging revealed severe diffuse anoxic injury.  This correlates with his poor neurologic clinical exam and with the EEG findings.  Is the opinion of the neuro critical care staff at the patient's optimal recovery would be a partial persistive vegetative state requiring full cares and uncertain whether he would recognize his family or his environment.  Indicated this would not be an acceptable quality of life for Shin going forward.  Palliative care offered empathetic listening and support.    2 paths of care planning were reviewed:   1-. Continue to support his body in the  setting of severe brain injury and poor prognosis.   2-allow family to visit in after a yet to be specified time to withdraw life support comfort cares and dignity at the end of life.  Family was clearly overwhelmed, wanted time to process before proceeding.  In the interim going forward it was strongly recommended the patient be made DNR. CODE STATUS was changed to reflect this decision on the part of family.  Briefly reviewed visitor policy as it relates to COVID-19 restrictions and potential comfort care provisions.  Key Palliative Symptoms:  We are not helping to manage these symptoms currently in this patient.           Review of Systems:     ROS was unable to be obtained 2/2 intubated and critically ill status.           Medications:     I have reviewed this patient's medication profile and medications during this hospitalization.           Physical Exam:   Vitals were reviewed  Temp: 99.3  F (37.4  C) Temp src: Axillary   Pulse: 93   Resp: 24 SpO2: 99 % O2 Device: Mechanical Ventilator .  No change from exam dated 10/20   General: In bed, intubated, minimally responsive.  HEENT: symmetric features noting he is orally intubated. OG tube in place, again no scleral icterus. Eyes are open, but the patient is not tracking. He does not follow simple commands.   CARDIAC: RRR, no m/r/g appreciated.  RESP: Mechanical breath sounds, no wheeze anteriorly  GI: NABS/ND  : Garcia in place- minimal urine output- on CRRT.   EXTREMITIES: trace UE and LE edema, cool extremities. Dusky right foot. Pulses + doppler carlos.    SKIN: No acute lesions appreciated  NEURO: Intubated, minimally responsive. vEEG ongoing with slow findings- risk for seizures.             Data Reviewed:     ROUTINE LABS (Last four results)  BMP  Recent Labs   Lab 10/22/21  0758 10/22/21  0757 10/22/21  0339 10/22/21  0338 10/21/21  2353 10/21/21  2223 10/21/21  2222 10/21/21  1610 10/21/21  1606 10/21/21  1606 10/21/21  1000 10/21/21  0953     --   142  --  141  --  142   < >  --  141  141   < > 141   POTASSIUM  --   --  4.1  --   --   --  4.2  --   --  4.2  4.2  --  4.2   CHLORIDE  --   --  111*  --   --   --  111*  --   --  109  109  --  109   JEANNE  --   --  7.8*  --   --   --  7.7*  --   --  8.0*  8.0*  --  7.9*   CO2  --   --  23  --   --   --  24  --   --  23  23  --  25   BUN  --   --  55*  --   --   --  58*  --   --  52*  52*  --  46*   CR  --   --  2.63*  --   --   --  2.86*  --   --  3.06*  3.06*  --  2.74*   GLC  --  121* 145* 147*  --  131* 133*   < >   < > 126*  126*  127*   < > 124*  123*    < > = values in this interval not displayed.     CBC  Recent Labs   Lab 10/22/21  0338 10/21/21  2222 10/21/21  1606 10/21/21  0953   WBC 16.1* 15.9* 14.8* 16.5*   RBC 2.84* 2.87* 2.86* 2.87*   HGB 8.9* 8.7* 8.8* 8.9*   HCT 25.9* 25.7* 25.9* 25.8*   MCV 91 90 91 90   MCH 31.3 30.3 30.8 31.0   MCHC 34.4 33.9 34.0 34.5   RDW 13.0 13.1 13.0 12.9   * 109* 99* 104*     INR  Recent Labs   Lab 10/22/21  0339 10/21/21  2222 10/21/21  1606 10/21/21  0953   INR 1.33* 1.35* 1.41* 1.50*

## 2021-10-22 NOTE — PROGRESS NOTES
"SPIRITUAL HEALTH SERVICES  PALLIATIVE SPIRITUAL ASSESSMENT   [facility] ([place] 4E    PRIMARY FOCUS:     Goals of care    Emotional distress    Support for coping    Care conference with patient Shin Smith's family in person and via telephone: wife Sariah, brother Parrish, niece Katiana, and sisters-in-law Mojgan, Deepika, and Lauren. Family received medical updates and discussed neurologic prognosis in particular. They are making decision about whether to switch focus of care to comfort measures only, given neurologic prognosis.    Distress: Family were tearful as they shared Shin's wish that \"he wouldn't want to be a vegetable\". They also have a large family who love him, and they are thinking about how best to manage the need for everyone to say goodbye. They are grieving as they anticipate his death.    Coping/Meaning Making: Family are mutually supportive and supportive of Shin. Their [employer] aide is also a source of comfort and meaning, and Shin has been anointed by  .    Intervention: Reviewed documentation. Offered spiritual and emotional support through reflective listening, values-based exploration of goals of care, and anticipatory grief support.    PLAN: Chaplain resident Giuseppe and/or I will follow for spiritual support while Palliative Care is consulted.    Hyun Muñoz  Palliative Care Consult Service   Pager [phone]  [facility] Inpatient Team Consult pager [phone] (M-F 8-4:30)  After-hours Answering Service [phone]       "

## 2021-10-23 NOTE — PROVIDER NOTIFICATION
10/23/21 1602   Midline Catheter Double Lumen   Placement Date/Time: 10/23/21 (c) 1602   Catheter Brand: Bard  Size (Fr): 5 Fr  Description: Non - valved (open ended)  Lot #: [id number]  Full barrier precautions done: Yes, hand hygiene, sterile gown, sterile gloves, mask, cap, full body drape, chlorh...   Site Assessment WDL   External Cath Length (cm) 0 cm   Initial Extremity Circumference (cm) 32 cm   Dressing Intervention Chlorhexidine patch;Transparent;Securing device;New dressing   Dressing Change Due 10/30/21   Purple - Status blood return noted;saline locked   Purple - Cap Change Due 10/27/21   Red - Status blood return noted;saline locked   Red - Cap Change Due 10/27/21   Extravasation? No

## 2021-10-23 NOTE — PROGRESS NOTES
"CRRT STATUS NOTE    DATA:  Time:  4:22 AM  Pressures WNL:  YES  Filter Status:  WDL    Problems Reported/Alarms Noted:  NA    Supplies Present:  YES    ASSESSMENT:  Patient Net Fluid Balance:  -2L yesterday, +135 since midnight.   Vital Signs:  Pulse 90   Temp 100  F (37.8  C) (Axillary)   Resp 25   Ht 1.829 m (6' 0.01\")   Wt 107.4 kg (236 lb 12.4 oz)   SpO2 98%   BMI 32.11 kg/m      Labs:  Reviewed  Goals of Therapy: I=O    INTERVENTIONS:   Restarted machine after filter clotted last evening.     PLAN:  Continue POC and notify CRRT RN with concerns. Plan for possible transition to comfort cares today.     "

## 2021-10-23 NOTE — PLAN OF CARE
Major Shift Events: No acute events this shift. CRRT circuit changed @ 1700 d/t filter clotting. CRRT I=O. SIC for 600cc @ 1745. Midline placed, thermaguard line removed. Rectal pouch replaced w/ rectal tube.  Plan: Continue CRRT I=O. Transition to comfort cares in morning.  For vital signs and complete assessments, please see documentation flowsheets.

## 2021-10-23 NOTE — PROCEDURES
[facility]    Double Lumen Midline Placement    Date/Time: 10/23/2021 4:02 PM  Performed by: Anjel Faust RN  Authorized by: Elizabeth Morley APRN CNP   Indications: vascular access    UNIVERSAL PROTOCOL   Site Marked: Yes  Prior Images Obtained and Reviewed:  Yes  Required items: Required blood products, implants, devices and special equipment available    Patient identity confirmed:  Verbally with patient, arm band, provided demographic data and hospital-assigned identification number  Patient was reevaluated immediately before administering moderate or deep sedation or anesthesia  Confirmation Checklist:  Patient's identity using two indicators, relevant allergies, procedure was appropriate and matched the consent or emergent situation and correct equipment/implants were available  Time out: Immediately prior to the procedure a time out was called    Universal Protocol: the Joint Commission Universal Protocol was followed    Preparation: Patient was prepped and draped in usual sterile fashion           ANESTHESIA    Anesthesia: See MAR for details  Local Anesthetic:  Lidocaine 1% without epinephrine  Anesthetic Total (mL):  1      SEDATION    Patient Sedated: No        Preparation: skin prepped with ChloraPrep  Skin prep agent: skin prep agent completely dried prior to procedure  Sterile barriers: maximum sterile barriers were used: cap, mask, sterile gown, sterile gloves, and large sterile sheet  Hand hygiene: hand hygiene performed prior to central venous catheter insertion  Type of line used: Midline (Power injectable)  Catheter type: double lumen  Lumen type: non-valved  Catheter size: 5 Fr  Brand: Bard  Lot number: [id number]  Placement method: venipuncture, MST and ultrasound  Number of attempts: 1  Successful placement: yes  Orientation: right  Location: basilic vein (vein diameter - 0.44 cm)  Arm circumference: adults 10 cm  Extremity circumference:  32  Visible catheter length: 0  Total catheter length: 20  Dressing and securement: chlorhexidine disc applied, glue, sterile dressing applied, statlock and site cleaned  Post procedure assessment: blood return through all ports and free fluid flow  PROCEDURE   Patient Tolerance:  Patient tolerated the procedure well with no immediate complications  Describe Procedure: Midline IV catheter is OK to use for NON irritant/vesicant IV medications. NOT recommended for lab draws.

## 2021-10-23 NOTE — PLAN OF CARE
No acute changes throughout shift. Remains unresponsive and does not follow commands. Will open eyes to pain, postures in BUE with pain. Pupils unequal, but reactive, team aware. Afebrile, Tmax 100.1- scheduled tylenol given and ice packs applied. In and out of controlled rate afib on tele monitoring, EKG ordered showing SR with PVCs, team made aware. BP stable- no vasoactive support needed.BP increases with activity. Vent settings remain unchanged. Pulses palpable but R) foot hot to touch L) foot cold. Overbreathing vent. Good cough. Loose BM, pouch in place. Remains on CRRT I=Os.

## 2021-10-23 NOTE — PROGRESS NOTES
"  Nephrology Progress Note  10/23/2021         Assessment & Recommendations:   Shin Smith is a 66 year old year old male admitted with cardiogenic shock after V. fib arrest.  He received cardioversion and then underwent targeted temperature management.  He was noted to have hyperkalemia even prior to reinvolvement and was initiated on CRRT.  Also developed oliguric acute kidney injury, no sign of renal recovery.    # Oliguric acute kidney injury, likely secondary acute tubular injury in setting of cardiogenic shock  # Hyperkalemia, resolved   # V Fib arrest with ROSC, s/p targeted temp mgmt  # Anoxic brain injury  # NICM     Plan:   - Continue CRRT for now, though it's acknowledged that pt may transition to comfort care  - 4 K bath, Is=Os to maintain euvolemia  - Strict Intake, output monitoring  - Daily weight checks  - Renally dose all medications        Recommendations were communicated to primary team via note    Seen and discussed with Dr. Ryan Yi MD   [phone]    Interval History :   Nursing and provider notes from last 24 hours reviewed.  No events overnight. Pt with severe diffuse anoxic brain injury on MRI per Neurology notes with poor neurologic prognosis. Per notes, pt has been made DNR/DNI and family is considering comfort care.    Review of Systems:   Unable to obtain as intubated, non responsive    Physical Exam:   I/O last 3 completed shifts:  In: 3147 [I.V.:1497; NG/GT:570]  Out: 4456 [Urine:600; Other:3006; Stool:850]   Pulse 84   Temp 99  F (37.2  C) (Axillary)   Resp 22   Ht 1.829 m (6' 0.01\")   Wt 104 kg (229 lb 4.5 oz)   SpO2 100%   BMI 31.09 kg/m       GENERAL APPEARANCE: Intubated, non responsive  HENT: ETT in place  PULM: clear bilaterally  CV: regular rhythm, normal rate, no rub     -JVD not distented     -edema absent   GI: soft, not-distended, bowel sounds are +  INTEGUMENT: purplish discoloration of toes, no rash      Labs:   All labs reviewed by " me  Electrolytes/Renal -   Recent Labs   Lab Test 10/23/21  1136 10/23/21  1132 10/23/21  0958 10/23/21  0750 10/23/21  0749 10/23/21  0349 10/23/21  0349 10/22/21  2227 10/22/21  1959   NA  --  144 145*  --  144   < > 143   < > 143   POTASSIUM  --  4.2 3.7  --   --   --  4.0   < > 4.1   CHLORIDE  --  113* 113*  --   --   --  113*   < > 112*   CO2  --  24 24  --   --   --  24   < > 23   BUN  --  65* 61*  --   --   --  62*   < > 64*   CR  --  2.48* 2.52*  --   --   --  2.55*   < > 2.86*   * 134* 134*  141*   < >  --   --  137*  141*   < > 138*   JEANNE  --  7.8* 7.6*  --   --   --  7.8*   < > 7.6*   MAG  --  2.8* 2.3  --   --   --  2.7*   < > 2.7*   PHOS  --  5.1*  --   --   --   --  4.7*  --  4.2    < > = values in this interval not displayed.       CBC -   Recent Labs   Lab Test 10/23/21  1133 10/23/21  0958 10/23/21  0349   WBC 13.7* 11.9* 14.9*   HGB 8.1* 7.9* 8.3*   * 119* 141*       LFTs -   Recent Labs   Lab Test 10/23/21  1132 10/23/21  0958 10/23/21  0349 10/22/21  2227 10/22/21  2227   ALKPHOS  --  91 89  --  80   BILITOTAL  --  0.4 0.4  --  0.4   ALT  --  94* 102*  --  111*   AST  --  92* 101*  --  108*   PROTTOTAL  --  5.5* 5.7*  --  5.9*   ALBUMIN 2.1* 2.1* 2.1*   < > 2.1*    < > = values in this interval not displayed.       Iron Panel - No lab results found.      Imaging:  All imaging studies reviewed by me.     Current Medications:    acetaminophen  650 mg Oral Q6H     amiodarone  400 mg Oral or Feeding Tube BID     aspirin  81 mg Per Feeding Tube Daily     B and C vitamin Complex with folic acid  5 mL Per Feeding Tube Daily     carvedilol  6.25 mg Oral or Feeding Tube BID     chlorhexidine  15 mL Swish & Spit BID     heparin ANTICOAGULANT  5,000 Units Subcutaneous Q12H     pantoprazole  40 mg Oral or Feeding Tube Daily     piperacillin-tazobactam  4.5 g Intravenous Q6H     polyethylene glycol  17 g Oral or Feeding Tube Daily     protein modular  2 packet Per Feeding Tube TID      senna-docusate  1 tablet Oral or Feeding Tube BID     sodium chloride (PF)  10 mL Intracatheter Q8H       dextrose       CRRT replacement solution 12.5 mL/kg/hr (10/23/21 1031)     - MEDICATION INSTRUCTIONS -       CRRT replacement solution 200 mL/hr at 10/22/21 1923     CRRT replacement solution 12.5 mL/kg/hr (10/23/21 1033)     sodium chloride 3% 30 mL/hr at 10/23/21 1300     Nano Yi MD

## 2021-10-23 NOTE — PLAN OF CARE
CRRT STATUS NOTE    DATA:  Time:  6:01 PM  Pressures WNL:  Yes  Filter Status:  WDL    Problems Reported/Alarms Noted:  None    Supplies Present:  Yes    ASSESSMENT:  Patient Net Fluid Balance:  Net IO Since Admission: -1,164.88 mL [10/23/21 1801]     Intake/Output Summary (Last 24 hours) at 10/23/2021 1801  Last data filed at 10/23/2021 1745  Gross per 24 hour   Intake 2490 ml   Output 3301 ml   Net -811 ml      Vitals:  Temp:  [99  F (37.2  C)-100  F (37.8  C)] 99.1  F (37.3  C)  Pulse:  [] 82  Resp:  [19-27] 23  MAP:  [63 mmHg-90 mmHg] 74 mmHg  Arterial Line BP: (119-162)/(38-56) 134/55  FiO2 (%):  [30 %] 30 %  SpO2:  [96 %-100 %] 98 %   Labs:    Lab Results   Component Value Date     (H) 10/23/2021    POTASSIUM 4.0 10/23/2021    CR 2.46 (H) 10/23/2021    BUN 62 (H) 10/23/2021    MAG 2.5 (H) 10/23/2021    PHOS 5.1 (H) 10/23/2021    WBC 13.1 (H) 10/23/2021    HGB 7.8 (L) 10/23/2021    HCT 23.0 (L) 10/23/2021     (L) 10/23/2021     Goals of Therapy:  I=O    INTERVENTIONS:   Review flow sheets and discuss plan of care with bedside nurse and nephrology team.    PLAN:  Continue fluid removal as tolerated per goals of therapy.  Check filter daily and change filter q 72 hrs and PRN.  Please contact the CRRT resource RN at [phone] with any questions/concerns.

## 2021-10-24 NOTE — PLAN OF CARE
Major Shift Events:  Transitioned to comfort cares, family members at bedside.   Plan: Administer PRNs  For vital signs and complete assessments, please see documentation flowsheets.

## 2021-10-24 NOTE — PROGRESS NOTES
Patient extubated to room air per MD order. Suctioned pre and post extubation.    10/24/2021  Meg Forrest, RT

## 2021-10-24 NOTE — PROGRESS NOTES
No acute events [id number]. Remains unresponsive, not following commands, SR with PAC's and PVC's SBP <160, CRRT I=O, intubated, vent settings unchanged, overbreathing the vent. Rectal tube in place.

## 2021-10-24 NOTE — PROGRESS NOTES
[facility]  Cardiology ICU Progress Note 10/24/2021    Plan for Today:  -moving into comfort care this afternoon     Assessment and Plan:    67 yo M without sig pmhx who was found to be gurgling and incontinent in his sleep by his wife who called EMS. Received some CPR and was found to be on ventricular fibrillation by EMS s/p shocks X3 amio and epi with ROSC.  Was emergently VA ECMO cannulated and transfer to the [facility] cath lab which showed non obstructive CAD.  Patient was de-cannulated on 10/20. Unfortunately extensive brain work-up has demonstrated severe anoxic brain injury. Moving into comfort care this afternoon.       WILL DISCONTINUE ALL HIS MEDICATIONS AND LABS ONCE HE MOVES TO COMFORT CARE THIS AFTERNOON. Leaving only medications for comfort.     Neurology  1. Concern for anoxic brain injury: Extensive work up including brain MRI which showed evidence of diffuse anoxic brain injury. Continuous EEG monitoring showed continuous activity with severe diffuse encephalopathy and generalized cortical irritability. Given the aformentioned findings NCC stated that prognosis from a neurologic perspective is low.    Cardiovascular / Hemodynamics  1. Refractory VF Cardiac arrest requiring VA ECMO (successful decannulation on 10/20)  - continue PO amiodarone     2. NICM - EF 10-20%: Most recent echo with severe left ventricular dysfunction with ejection fraction 10-20%.  Coronary angiography without evidence of coronary artery disease.  On 10/21 intra-aortic balloon pump was removed.   - Continue ASA 81mg   - Carvedilol 6.25 mg BID. Will consider to discontinue if he starts having low BP    Pulmonary  1. Acute hypoxemic respiratory failure requiring intubation  - Daily CXR  - Q2h ABGs   - Consider scheduled duonebs if signs of lung dz, currently PRN     GI   1. Nutrition   - Monitor BID LFTs   - Start tube feeds   - Bowel regimen     2. Shock liver 2/2 cardiac arrest,  "resolved    Renal, Fluid and Electrolytes  1.  Acute Renal Injury, likely 2/2  ATN   - On CRRT   - will try to keep him in the lower levels of euvolemia   - Appreciate renal support    2.  Hypernatremia   -On 3% saline infusion     4. Hyperkalemia, stable    -managed with CRRT     5. Hypoalbuminemia, unchanged  6. Lactic acidosis, resolved     Infectious Disease  1. Aspiration Pneumonia, resolved. on zosyn and vancomycin from (10/17-10/23)   2.  Leukocytosis, stable. No signs of active ongoing infectious process. Although he has necrotic lesions in both feet that might put him at high risk of infectious process.     Hematology and Oncology  1. Anemia of critical illness, stable   Hgb and platelets stable. No e/o bleeding.    Endocrinology  1. Hyperglycemia, well controlled on insulin drip  - Insulin gtt as needed  - Hgb a1c 5.5    Lines:  -art line 10/17  -right upper arm peripheral IV 10/17    Code Status: DNR    Patient seen and discussed with staff physician, Dr. Ramirez.    Jorge L Reyes Castro, MD, MS  Cardiology Fellow      Interval Events:  No acute events overnight. Hemodynamics have remained stable and off pressors.     Objective:  Most recent vital signs:  Pulse 77   Temp 99.8  F (37.7  C) (Axillary)   Resp 19   Ht 1.829 m (6' 0.01\")   Wt 102 kg (224 lb 13.9 oz)   SpO2 99%   BMI 30.49 kg/m    Temp:  [99  F (37.2  C)-99.8  F (37.7  C)] 99.8  F (37.7  C)  Pulse:  [75-94] 77  Resp:  [18-24] 19  MAP:  [64 mmHg-101 mmHg] 78 mmHg  Arterial Line BP: (114-166)/(44-63) 127/61  FiO2 (%):  [30 %] 30 %  SpO2:  [93 %-100 %] 99 %  Wt Readings from Last 2 Encounters:   10/24/21 102 kg (224 lb 13.9 oz)   10/17/21 112.5 kg (248 lb 0.3 oz)       Intake/Output Summary (Last 24 hours) at 10/22/2021 0717  Last data filed at 10/22/2021 0700  Gross per 24 hour   Intake 3633.25 ml   Output 4466 ml   Net -832.75 ml     Physical exam:  General: sedated and intubated.  Unchanged compare to yesterday  HEENT: PERRL. No scleral " icterus or injection  CARDIAC: heart sounds regular and rhythmic. No murmurs.   RESP: Passive movement of air.   GI: NABS.   EXTREMITIES: NO LE edema. Feels cool to touch. Dusky top and bottom of right foot.    bilaterally.  Femoral access sites w/o bleeding, dressing c/d/i. No bruits appreciated.   SKIN: No acute lesions appreciated    Labs (Past three days):  CBC  Recent Labs   Lab 10/24/21  1005 10/24/21  0322 10/23/21  2212 10/23/21  1953   WBC 12.2* 12.6* 13.7* 15.1*   RBC 2.44* 2.48* 2.54* 2.71*   HGB 7.4* 7.6* 7.8* 8.2*   HCT 22.3* 22.7* 23.3* 24.5*   MCV 91 92 92 90   MCH 30.3 30.6 30.7 30.3   MCHC 33.2 33.5 33.5 33.5   RDW 13.4 13.5 13.5 13.4    156 164 167     BMP  Recent Labs   Lab 10/24/21  1005 10/24/21  0747 10/24/21  0325 10/24/21  0323 10/24/21  0322 10/24/21  0012 10/23/21  2212 10/23/21  2212 10/23/21  1953 10/23/21  1953 10/23/21  1136 10/23/21  1132 10/23/21  0749 10/23/21  0349   * 144  --   --  144 146*  --  145*   < > 145*   < > 144   < > 143   POTASSIUM 4.1  --   --   --  3.9  --   --  4.1  --  4.1   < > 4.2   < > 4.0   CHLORIDE 113*  --   --   --  113*  --   --  114*  --  114*   < > 113*   < > 113*   CO2 23  --   --   --  25  --   --  23  --  24   < > 24   < > 24   ANIONGAP 9  --   --   --  6  --   --  8  --  7   < > 7   < > 6   *  132*  --  119* 134* 133* 118*   < > 133*  139*   < > 128*   < > 134*   < > 137*  141*   BUN 59*  --   --   --  57*  --   --  62*  --  65*   < > 65*   < > 62*   CR 2.33*  --   --   --  2.30*  --   --  2.39*  --  2.43*   < > 2.48*   < > 2.55*   GFRESTIMATED 28*  --   --   --  29*  --   --  28*  --  27*   < > 26*   < > 26*   JEANNE 7.7*  --   --   --  7.9*  --   --  7.7*  --  8.0*   < > 7.8*   < > 7.8*   MAG 2.8*  --   --   --  2.6*  --   --  2.6*  --  2.6*   < > 2.8*   < > 2.7*   PHOS  --   --   --   --  5.1*  --   --   --   --  5.1*  --  5.1*  --  4.7*    < > = values in this interval not displayed.     Troponins:     INR  Recent Labs   Lab  10/24/21  0322 10/23/21  0349 10/22/21  2227 10/22/21  1557   INR 1.29* 1.35* 1.33* 1.29*     Liver panel  Recent Labs   Lab 10/24/21  1005 10/24/21  0322 10/23/21  2212 10/23/21  1953 10/23/21  1712 10/23/21  1712   PROTTOTAL 5.7* 5.7* 6.0*  --   --  5.7*   ALBUMIN 2.0* 2.1* 2.2* 2.2*   < > 2.0*   BILITOTAL 0.3 0.3 0.3  --   --  0.3   ALKPHOS 87 87 86  --   --  88   AST 77* 78* 85*  --   --  88*   ALT 94* 91* 96*  --   --  94*    < > = values in this interval not displayed.

## 2021-10-25 NOTE — PLAN OF CARE
Morphine gtt, fent, and versed given for patient comfort. Comfort measures remain in place. Family to visit today.

## 2021-10-25 NOTE — CONSULTS
WOC here for ECMO prevention follow up. Pt is on comfort cares, no WOC service needed at this time. WOC will sign off, updated RN with POC.

## 2021-10-25 NOTE — PROGRESS NOTES
[facility]  Cardiology ICU Progress Note    Plan for Today:  -Comfort Cares  - Transfer to the floor when a bed is available.     Assessment and Plan:     65 yo M without sig pmhx who was found to be gurgling and incontinent in his sleep by his wife who called EMS. Received some CPR and was found to be on ventricular fibrillation by EMS s/p shocks X3 amio and epi with ROSC.  Was emergently VA ECMO cannulated and transfer to the [facility] cath lab which showed non obstructive CAD.  Patient was de-cannulated on 10/20. Unfortunately extensive brain work-up has demonstrated severe anoxic brain injury. Transitioned to comfort cares and extubated on 10/24/2021.     Neurology  1. Concern for anoxic brain injury: Extensive work up including brain MRI which showed evidence of diffuse anoxic brain injury. Continuous EEG monitoring showed continuous activity with severe diffuse encephalopathy and generalized cortical irritability. Given the aformentioned findings [facility] stated that prognosis from a neurologic perspective is low.     Cardiovascular / Hemodynamics  1. Refractory VF Cardiac arrest requiring VA ECMO (successful decannulation on 10/20)     2. NICM - EF 10-20%: Most recent echo with severe left ventricular dysfunction with ejection fraction 10-20%.  Coronary angiography without evidence of coronary artery disease.  On 10/21 intra-aortic balloon pump was removed.     Pulmonary  1. Acute hypoxemic respiratory failure requiring intubation  - Extubated given comfort cares  - Morphine for air hunger     GI   1. Nutrition  2. Shock liver 2/2 cardiac arrest, resolved     Renal, Fluid and Electrolytes  1.  Acute Renal Injury, likely 2/2  ATN  2.  Hypernatremia  4. Hyperkalemia, stable   5. Hypoalbuminemia, unchanged  6. Lactic acidosis, resolved   - CRRT discontinued given comfort measures    Infectious Disease  1. Aspiration Pneumonia, resolved. on zosyn and vancomycin from  "(10/17-10/23)   2.  Leukocytosis, stable. No signs of active ongoing infectious process. Although he has necrotic lesions in both feet that might put him at high risk of infectious process.      Hematology and Oncology  1. Anemia of critical illness, stable   Hgb and platelets stable. No e/o bleeding.     Endocrinology  1. Hyperglycemia     Code Status: DNR    Patient seen and discussed with staff physician.    Fawad Monterroso MD   Cardiology Fellow  [phone]    Interval Events:  Transitioned to comfort cares yesterday    Objective:  Most recent vital signs:  Pulse 85   Temp 99.1  F (37.3  C) (Axillary)   Resp 22   Ht 1.829 m (6' 0.01\")   Wt 102 kg (224 lb 13.9 oz)   SpO2 98%   BMI 30.49 kg/m    Temp:  [99.1  F (37.3  C)] 99.1  F (37.3  C)  Pulse:  [75-85] 85  Resp:  [19-32] 22  MAP:  [65 mmHg-85 mmHg] 85 mmHg  Arterial Line BP: (115-131)/(44-61) 131/60  FiO2 (%):  [30 %] 30 %  SpO2:  [93 %-99 %] 98 %  Wt Readings from Last 2 Encounters:   10/24/21 102 kg (224 lb 13.9 oz)   10/17/21 112.5 kg (248 lb 0.3 oz)       Intake/Output Summary (Last 24 hours) at 10/25/2021 0821  Last data filed at 10/25/2021 0800  Gross per 24 hour   Intake 510.36 ml   Output 708 ml   Net -197.64 ml     Physical exam:  General: In bed, in NAD, comfortable  HEENT: No scleral icterus or injection  CARDIAC: No swelling  RESP: Airway secretions   GI: Non-distended  SKIN: No acute lesions appreciated  NEURO: Non-responsive    Labs (Past three days):  CBC  Recent Labs   Lab 10/24/21  1208 10/24/21  1005 10/24/21  0322 10/23/21  2212   WBC 14.8* 12.2* 12.6* 13.7*   RBC 2.55* 2.44* 2.48* 2.54*   HGB 8.0* 7.4* 7.6* 7.8*   HCT 23.4* 22.3* 22.7* 23.3*   MCV 92 91 92 92   MCH 31.4 30.3 30.6 30.7   MCHC 34.2 33.2 33.5 33.5   RDW 13.4 13.4 13.5 13.5    167 156 164     BMP  Recent Labs   Lab 10/24/21  1208 10/24/21  1005 10/24/21  0747 10/24/21  0325 10/24/21  0323 10/24/21  0322 10/24/21  0012 10/23/21  2212 10/23/21  1953 10/23/21  1953 " 10/23/21  1136 10/23/21  1132   * 145* 144  --   --  144   < > 145*   < > 145*   < > 144   POTASSIUM 4.1 4.1  --   --   --  3.9  --  4.1   < > 4.1   < > 4.2   CHLORIDE 112* 113*  --   --   --  113*  --  114*   < > 114*   < > 113*   CO2 25 23  --   --   --  25  --  23   < > 24   < > 24   ANIONGAP 8 9  --   --   --  6  --  8   < > 7   < > 7   * 134*  132*  --  119*   < > 133*   < > 133*  139*   < > 128*   < > 134*   BUN 55* 59*  --   --   --  57*  --  62*   < > 65*   < > 65*   CR 2.29* 2.33*  --   --   --  2.30*  --  2.39*   < > 2.43*   < > 2.48*   GFRESTIMATED 29* 28*  --   --   --  29*  --  28*   < > 27*   < > 26*   JEANNE 7.6* 7.7*  --   --   --  7.9*  --  7.7*   < > 8.0*   < > 7.8*   MAG 2.7* 2.8*  --   --   --  2.6*  --  2.6*   < > 2.6*   < > 2.8*   PHOS 5.3*  --   --   --   --  5.1*  --   --   --  5.1*  --  5.1*    < > = values in this interval not displayed.     Troponins:     INR  Recent Labs   Lab 10/24/21  0322 10/23/21  0349 10/22/21  2227 10/22/21  1557   INR 1.29* 1.35* 1.33* 1.29*     Liver panel  Recent Labs   Lab 10/24/21  1208 10/24/21  1005 10/24/21  0322 10/23/21  2212 10/23/21  1953 10/23/21  1712   PROTTOTAL  --  5.7* 5.7* 6.0*  --  5.7*   ALBUMIN 2.2* 2.0* 2.1* 2.2*   < > 2.0*   BILITOTAL  --  0.3 0.3 0.3  --  0.3   ALKPHOS  --  87 87 86  --  88   AST  --  77* 78* 85*  --  88*   ALT  --  94* 91* 96*  --  94*    < > = values in this interval not displayed.       Imaging/procedure results:

## 2021-10-25 NOTE — PROGRESS NOTES
CLINICAL NUTRITION SERVICES - BRIEF NOTE     RD following for enteral nutrition support. Pt transitioned to comfort cares. RD to sign off at this time, will remain available by consult.     Ai Gore RD, LD  [id number]  Pgr: [id number]

## 2021-10-25 NOTE — PROGRESS NOTES
Care Management Follow Up    Length of Stay (days): 8    Expected Discharge Date:  TBD     Patient plan of care discussed at interdisciplinary rounds: Yes    Additional Information:  Care conf. was held on Friday, 10/22 with family and the providers.  After the team discussed in detail about pt hospital course and poor neurological recovery, pt family informed the family that pt would not want to continue with aggressive care if he is going to have poor neuro recovery.  The team and family agreed to transition the focus of the care to comfort care after the family sees pt and said their goodbye.  Pt extubated yesterday, 10/24 and transition to comfort care after family said their goody.  Pt is on comfort care now and has transport order to the floor.  Per discussion with the team pt, would not need hospice placement.  RNCC will cont to be available for any RNCC assistance need.    Maira Agrawal RN, PHN, BSN  4A and 4E/ ICU  Care Coordinator  Phone: [phone]  Pager: [phone]    To get in touch with weekend & Holiday on call RN Care Coordinator  Page [phone] or Care Coordinator Job code/pager- [phone]

## 2021-10-25 NOTE — PROGRESS NOTES
SPIRITUAL HEALTH SERVICES  SPIRITUAL ASSESSMENT Progress Note (Palliative Focus)  [facility] ([place] 4E    REFERRAL SOURCE: Palliative care consult, follow-up    Checked in with pt, Armando, and his wife, Sairah. Armando is extubated and on comfort care. Sariah commented that he seems comfortable and that his breathing is a little slower than yesterday. Offered emotional support to Sariah and prayed with her and Armando at bedside. Sariah mentioned they would appreciate a visit from the [facility] if possible.      Plan: Communicated with [facility]. Will continue to follow while palliative care consult remains open.    Calos Chin  Palliative Chaplain Resident  Pager [phone]    [facility] Palliative Care Inpatient Team Consult pager [phone] (M-F 8-4:30)  After-hours Answering Service [phone]

## 2021-10-25 NOTE — PROGRESS NOTES
"[facility]  Palliative Care Daily Progress Note       Recommendations/discussion & Counseling     Patient seen and examined. I spoke with primary team, ICU RN. Was extubated and transitioned to CMO on 10/24- noting care conference review of MRI findings supporting clinical evidence of global anoxic brain injury. See notes..  From a cardiac POV he was successfully decannulated from ECMO 10/20, off sedation as of am of 10/19 and IABP support discontinued 10/21.  PC has followed closely offered empathetic listening and presence in the setting of  Clinical/ imaging findings. Allyson (spouse) has good family support.  Code Status -10/22- changed to DNR/DNI - (no healthcare directives are completed)  Patient spouse indicates that the never discussed limited treatment plans only that \"he would not want to be a vegetable.\"      Assessments          Shin Smith is a 66 year old male without known significant prior medical history who was found to have change in his sleep (sonorous respirations and urinary incontinence) followed by unresponsiveness by his wife who performed CPR and called EMS -was found to be in VF s/p shocks X3 amio and epi with ROSC-  presented to [facility] with recurrent VF arrest and intermittent ROSC with underlying cardiogenic shock- cannulated emergently on VA ECMO --> [facility] cath lab --> no significant coronary disease. Unclear down time.     Head CT imaging (10/20)  led to MRI imaging (10/21):    1. Diffuse cortical infarcts in the cerebrum greater than the  cerebellum likely representing diffuse hypoxic ischemic injury.   2. Diffuse cerebral and cerebellar foci of susceptibility suggestive  of multifocal microhemorrhage which could be secondary to amyloid  angiopathy or possibly coagulopathy.    Today,10/25 the patient was seen for PC follow up support 2/2 sequelae post cardiac arrest and recent transition to CMO.     Prognosis, Goals, or Advance " Care Planning was addressed today with: No.  Mood, coping, and/or meaning in the context of serious illness were addressed today: No.  Summary/Comments: see consult note dated 10/18 and care conference note 10/22.   Lyle MONTANO NP  Nurse Practitioner- Lead Advanced Practice Provider  [facility] Palliative Medicine Consult Service   [phone]  TT spent: 30 minutes of which 20 minutes were spent in direct face to face contact with patient/family. Greater than 50% of time spent counseling and/or coordinating care.          Interval History:     Chart review/discussion with unit or clinical team members:    CMO as of 10/24. Family at bedside. Even sonorous respirations. No sx of distress. Skin- including extremities remains warm.     Key Palliative Symptoms:  We are not helping to manage these symptoms currently in this patient.           Review of Systems:     ROS was unable to be obtained 2/2 intubated and critically ill status.           Medications:     I have reviewed this patient's medication profile and medications during this hospitalization.           Physical Exam:   Vitals were reviewed  Temp: 99.1  F (37.3  C) Temp src: Axillary   Pulse: 85   Resp: 22 SpO2: 98 % O2 Device: (S) None (Room air)   General: In bed, CMO in place. Pt. minimally responsive. Family at bedside.   HEENT: symmetric features. MM mildly dry. Eyes with lacrilube. He does not follow simple commands.   CARDIAC: RRR, + friction rub.  RESP: even sonorous respirations, some posterior pharynx secretions. No wheeze.   GI: NABS/ND   EXTREMITIES: 1-2+ UE and LE edema, cool extremities. Dusky right foot. Pulses + doppler carlos.    NEURO:  minimally responsive             Data Reviewed:     ROUTINE LABS- now on comfort cares.   BMP  Recent Labs   Lab 10/24/21  1208 10/24/21  1005 10/24/21  0747 10/24/21  0325 10/24/21  0323 10/24/21  0322 10/24/21  0012 10/23/21  2212   * 145* 144  --   --  144   < > 145*   POTASSIUM 4.1 4.1  --   --    --  3.9  --  4.1   CHLORIDE 112* 113*  --   --   --  113*  --  114*   JEANNE 7.6* 7.7*  --   --   --  7.9*  --  7.7*   CO2 25 23  --   --   --  25  --  23   BUN 55* 59*  --   --   --  57*  --  62*   CR 2.29* 2.33*  --   --   --  2.30*  --  2.39*   * 134*  132*  --  119*   < > 133*   < > 133*  139*    < > = values in this interval not displayed.     CBC  Recent Labs   Lab 10/24/21  1208 10/24/21  1005 10/24/21  0322 10/23/21  2212   WBC 14.8* 12.2* 12.6* 13.7*   RBC 2.55* 2.44* 2.48* 2.54*   HGB 8.0* 7.4* 7.6* 7.8*   HCT 23.4* 22.3* 22.7* 23.3*   MCV 92 91 92 92   MCH 31.4 30.3 30.6 30.7   MCHC 34.2 33.2 33.5 33.5   RDW 13.4 13.4 13.5 13.5    167 156 164     INR  Recent Labs   Lab 10/24/21  0322 10/23/21  0349 10/22/21  2227 10/22/21  1557   INR 1.29* 1.35* 1.33* 1.29*

## 2021-10-25 NOTE — PROGRESS NOTES
Transfer        5:58 PM  ---------------------------------------------------------------------  Transferred to/from: to 6C from   Via: Bed  Reason for transfer: Comfort cares  Family: Aware of transfer  Belongings: Sent with pt  Chart: Sent with pt  Medications: Meds from bin sent with pt  Report called to: 6C nurse from  nurse     Resp:  [20-32] 26     Yasemin Salazar RN

## 2021-10-26 NOTE — PROGRESS NOTES
CV Surgery    Patient s/p ECMO decan with Dr. Mi on 10/20, transferred to floor now on comfort cares. Incision appears to be healing well, no signs of infection, no drainage. No retention sutures in place, skin glue will fall off. CVTS will signs off, please do not hesitate to call with questions or concerns.     Dwayne Holloway PA-C  Cardiothoracic Surgery  p: [phone]

## 2021-10-26 NOTE — PROGRESS NOTES
"[facility]  Palliative Care Daily Progress Note       Recommendations/discussion & Counseling     Patient seen and examined. I spoke with primary team, bedside RN. Was extubated and transitioned to CMO on 10/24- noting care conference review of MRI findings supporting clinical evidence of global anoxic brain injury. See notes..  From a cardiac POV he was successfully decannulated from ECMO 10/20, off sedation as of am of 10/19 and IABP support discontinued 10/21.  PC has followed closely offered empathetic listening and presence in the setting of  Clinical/ imaging findings. Allyson (spouse) has good family support.  Code Status -10/22- changed to DNR/DNI - (no healthcare directives are completed)  Patient spouse indicates that the never discussed limited treatment plans only that \"he would not want to be a vegetable.\"      Assessments          Shin Smith is a 66 year old male without known significant prior medical history who was found to have change in his sleep (sonorous respirations and urinary incontinence) followed by unresponsiveness by his wife who performed CPR and called EMS -was found to be in VF s/p shocks X3 amio and epi with ROSC-  presented to [facility] with recurrent VF arrest and intermittent ROSC with underlying cardiogenic shock- cannulated emergently on VA ECMO --> [facility] cath lab --> no significant coronary disease. Unclear down time.     Head CT imaging (10/20)  led to MRI imaging (10/21):    1. Diffuse cortical infarcts in the cerebrum greater than the  cerebellum likely representing diffuse hypoxic ischemic injury.   2. Diffuse cerebral and cerebellar foci of susceptibility suggestive  of multifocal microhemorrhage which could be secondary to amyloid  angiopathy or possibly coagulopathy.    Today,10/26 the patient was seen for PC follow up support 2/2 sequelae post cardiac arrest and recent transition to CMO.     Prognosis, Goals, or " Advance Care Planning was addressed today with: No.  Mood, coping, and/or meaning in the context of serious illness were addressed today: No.  Summary/Comments: see consult note dated 10/18 and care conference note 10/22.   Lyle MONTANO NP  Nurse Practitioner- Lead Advanced Practice Provider  [facility] Palliative Medicine Consult Service   [phone]  TT spent:   40 minutes of which 25 minutes were spent in direct face to face contact with patient/family. Greater than 50% of time spent counseling and/or coordinating care.          Interval History:     Chart review/discussion with unit or clinical team members:    CMO as of 10/24. Family at bedside. Even sonorous respirations with occasional pauses. No sx of distress. Skin- including extremities remains warm.     Key Palliative Symptoms:  We are not helping to manage these symptoms currently in this patient.           Review of Systems:     ROS was unable to be obtained 2/2 intubated and critically ill status.           Medications:     I have reviewed this patient's medication profile and medications during this hospitalization.           Physical Exam:   Vitals were reviewed  Temp: (!) 103  F (39.4  C) Temp src: Axillary BP: 114/61     Resp: 12       General: In bed, CMO in place. Pt. unresponsive. Family at bedside.   HEENT: symmetric features. MM mildly dry. Eyes with lacrilube. He does not follow simple commands.   CARDIAC: RRR, tachy in 110s apically.  RESP: even, some pauses with deep sonorous respirations,  posterior pharynx secretions. No wheeze.   GI: NABS/ND   EXTREMITIES: 1-2+ UE and LE edema, cool carlos  LE. Dusky right foot. Pulses + doppler carlos.    NEURO:  unresponsive- progressing to EOL responses.              Data Reviewed:     ROUTINE LABS- now on comfort cares.   BMP  Recent Labs   Lab 10/24/21  1208 10/24/21  1005 10/24/21  0747 10/24/21  0325 10/24/21  0323 10/24/21  0322 10/24/21  0012 10/23/21  2212   * 145* 144  --   --  144    < > 145*   POTASSIUM 4.1 4.1  --   --   --  3.9  --  4.1   CHLORIDE 112* 113*  --   --   --  113*  --  114*   JEANNE 7.6* 7.7*  --   --   --  7.9*  --  7.7*   CO2 25 23  --   --   --  25  --  23   BUN 55* 59*  --   --   --  57*  --  62*   CR 2.29* 2.33*  --   --   --  2.30*  --  2.39*   * 134*  132*  --  119*   < > 133*   < > 133*  139*    < > = values in this interval not displayed.     CBC  Recent Labs   Lab 10/24/21  1208 10/24/21  1005 10/24/21  0322 10/23/21  2212   WBC 14.8* 12.2* 12.6* 13.7*   RBC 2.55* 2.44* 2.48* 2.54*   HGB 8.0* 7.4* 7.6* 7.8*   HCT 23.4* 22.3* 22.7* 23.3*   MCV 92 91 92 92   MCH 31.4 30.3 30.6 30.7   MCHC 34.2 33.2 33.5 33.5   RDW 13.4 13.4 13.5 13.5    167 156 164     INR  Recent Labs   Lab 10/24/21  0322 10/23/21  0349 10/22/21  2227 10/22/21  1557   INR 1.29* 1.35* 1.33* 1.29*

## 2021-10-26 NOTE — DEATH PRONOUNCEMENT
MD DEATH PRONOUNCEMENT    Called to pronounce Shin Smith dead.    Physical Exam: Unresponsive to noxious stimuli, Spontaneous respirations absent, Breath sounds absent, Carotid pulse absent, Heart sounds absent, Pupillary light reflex absent and Corneal blink reflex absent    Patient was pronounced dead at 6:35 PM, 2021.    Preliminary Cause of Death: Anoxic Brain Injury    Active Problems:    Cardiac arrest (H)    Acute kidney injury (H)       Infectious disease present?: NO    Communicable disease present? (examples: HIV, chicken pox, TB, Ebola, CJD) :  NO    Multi-drug resistant organism present? (example: MRSA): NO    Please consider an autopsy if any of the following exist:  NO Unexpected or unexplained death during or following any dental, medical, or surgical diagnostic treatment procedures.   NO Death of mother at or up to seven days after delivery.     NO All  and pediatric deaths.     NO Death where the cause is sufficiently obscure to delay completion of the death certificate.   NO Deaths in which autopsy would confirm a suspected illness/condition that would affect surviving family members or recipients of transplanted organs.     The following deaths must be reported to the 's Office:  NO A death that may be due entirely or in part to any factors other than natural disease (recent surgery, recent trauma, suspected abuse/neglect).   NO A death that may be an accident, suicide, or homicide.     NO Any sudden, unexpected death in which there is no prior history of significant heart disease or any other condition associated with sudden death.   NO A death under suspicious, unusual, or unexpected circumstances.    NO Any death which is apparently due to natural causes but in which the  does not have a personal physician familiar with the patient s medical history, social, or environmental situation or the circumstances of the terminal event.   NO Any death apparently due  to Sudden Infant Death Syndrome.     NO Deaths that occur during, in association with, or as consequences of a diagnostic, therapeutic, or anesthetic procedure.   NO Any death in which a fracture of a major bone has occurred within the past (6) six months.   NO A death of persons note seen by their physician within 120 days of demise.     NO Any death in which the  was an inmate of a public institution or was in the custody of Law Enforcement personnel.   NO  All unexpected deaths of children   NO Solid organ donors   NO Unidentified bodies   NO Deaths of persons whose bodies are to be cremated or otherwise disposed of so that the bodies will later be unavailable for examination;   NO Deaths unattended by a physician outside of a licensed healthcare facility or licensed residential hospice program   NO Deaths occurring within 24 hours of arrival to a health care facility if death is unexpected.    NO Deaths associated with the decedent s employment.   NO Deaths attributed to acts of terrorism.   NO Any death in which there is uncertainty as to whether it is a medical examiner s care should be discussed with the medical investigator.        Body disposition: Autopsy was discussed with family member:  Spouse by phone.  Permission for autopsy was declined.  Body released to the morgue.

## 2021-10-26 NOTE — PLAN OF CARE
Shin Smith is a 67 yo male admitted 10/17 for cardiac arrest wchis has been complicated by anoxic brain injury. Currently on comfort care, morphine gtt at 4mg/ml hr. PRN boluses given q15 minutes for ease of breathing. Pt's end of life condition is worsening as the shift progresses. Oral swabs and cool wash clothes being used, repositioning q2h or as needed.

## 2021-10-26 NOTE — PLAN OF CARE
D: Admitted 10/17 for Cardiac arrest, now comfort cares.    I: Monitored vitals and assessed pt status.   Running: Morphine gtt 4mL/hr  PRN: artificial tears x1, atropine x1, and Fentanyl x1.    A: Comfort care orders. Repositioned pt Q2hrs, oral cares Q2hrs. Nasal suctioning as needed. Pt seems to be rest comfortably, no sings or facial expressions of pain noted.     Resp:  [12-26] 12      P: Continue to monitor Pt status and report changes to CSI.

## 2021-10-26 NOTE — PROGRESS NOTES
Cardiology Progress Note  10/26/2021      Plan for Today:  -Comfort Cares     Assessment and Plan:     65 yo M without sig pmhx who was found to be gurgling and incontinent in his sleep by his wife who called EMS. Received some CPR and was found to be on ventricular fibrillation by EMS s/p shocks X3 amio and epi with ROSC.  Was emergently VA ECMO cannulated and transfer to the [facility] cath lab which showed non obstructive CAD.  Patient was de-cannulated on 10/20. Unfortunately extensive brain work-up has demonstrated severe anoxic brain injury. Transitioned to comfort cares and extubated on 10/24/2021.     Neurology  Concern for anoxic brain injury: Extensive work up including brain MRI which showed evidence of diffuse anoxic brain injury. Continuous EEG monitoring showed continuous activity with severe diffuse encephalopathy and generalized cortical irritability. Given the aformentioned findings NCC stated that prognosis from a neurologic perspective is low.     Cardiovascular / Hemodynamics  #Refractory VF Cardiac arrest requiring VA ECMO (successful decannulation on 10/20)  #NICM - EF 10-20%: Most recent echo with severe left ventricular dysfunction with ejection fraction 10-20%.  Coronary angiography without evidence of coronary artery disease.  On 10/21 intra-aortic balloon pump was removed.     Pulmonary  #Acute hypoxemic respiratory failure requiring intubation  - Extubated given comfort cares  - Morphine for air hunger   - Atropine for secretions     GI   #Nutrition  #Shock liver 2/2 cardiac arrest, resolved     Renal, Fluid and Electrolytes  #Acute Renal Injury, likely 2/2  ATN  #Hypernatremia  #Hyperkalemia, stable   #Hypoalbuminemia, unchanged  #Lactic acidosis, resolved   - CRRT discontinued given comfort measures     Infectious Disease  #Aspiration Pneumonia, resolved. on zosyn and vancomycin from (10/17-10/23)   #Leukocytosis, stable. No signs of active ongoing infectious process. Although he has necrotic  "lesions in both feet that might put him at high risk of infectious process.      Hematology and Oncology  #Anemia of critical illness, stable   Hgb and platelets stable. No e/o bleeding.     Endocrinology  #Hyperglycemia     Code Status: DNR     Patient seen and discussed with staff physician.    Fawad Monterroso MD  Cardiology Fellow  [phone]      ===================================================================    SUBJECTIVE: SHIELA o/n. Remains comfortable    Nursing notes reviewed.    OBJECTIVE:  Pulse 85   Temp 99.1  F (37.3  C) (Axillary)   Resp 12   Ht 1.829 m (6' 0.01\")   Wt 102 kg (224 lb 13.9 oz)   SpO2 98%   BMI 30.49 kg/m    No data recorded.      I/O:    Intake/Output Summary (Last 24 hours) at 10/26/2021 0802  Last data filed at 10/25/2021 2215  Gross per 24 hour   Intake 57 ml   Output --   Net 57 ml       Wt:   Wt Readings from Last 5 Encounters:   10/24/21 102 kg (224 lb 13.9 oz)   10/17/21 112.5 kg (248 lb 0.3 oz)   01/04/12 103.3 kg (227 lb 10 oz)       General: In bed, in NAD, comfortable  HEENT: No scleral icterus or injection  CARDIAC: No swelling  RESP: Airway secretions noted  GI: Non-distended  SKIN: No acute lesions appreciated  NEURO: Non-responsive    Labs: reviewed in EMR  CBC  Recent Labs   Lab 10/24/21  1208 10/24/21  1005 10/24/21  0322 10/23/21  2212   WBC 14.8* 12.2* 12.6* 13.7*   RBC 2.55* 2.44* 2.48* 2.54*   HGB 8.0* 7.4* 7.6* 7.8*   HCT 23.4* 22.3* 22.7* 23.3*   MCV 92 91 92 92   MCH 31.4 30.3 30.6 30.7   MCHC 34.2 33.2 33.5 33.5   RDW 13.4 13.4 13.5 13.5    167 156 164     BMP  Recent Labs   Lab 10/24/21  1208 10/24/21  1005 10/24/21  0747 10/24/21  0325 10/24/21  0323 10/24/21  0322 10/24/21  0012 10/23/21  2212 10/23/21  1953 10/23/21  1953 10/23/21  1136 10/23/21  1132   * 145* 144  --   --  144   < > 145*   < > 145*   < > 144   POTASSIUM 4.1 4.1  --   --   --  3.9  --  4.1   < > 4.1   < > 4.2   CHLORIDE 112* 113*  --   --   --  113*  --  114*   < > 114*  "  < > 113*   CO2 25 23  --   --   --  25  --  23   < > 24   < > 24   ANIONGAP 8 9  --   --   --  6  --  8   < > 7   < > 7   * 134*  132*  --  119*   < > 133*   < > 133*  139*   < > 128*   < > 134*   BUN 55* 59*  --   --   --  57*  --  62*   < > 65*   < > 65*   CR 2.29* 2.33*  --   --   --  2.30*  --  2.39*   < > 2.43*   < > 2.48*   GFRESTIMATED 29* 28*  --   --   --  29*  --  28*   < > 27*   < > 26*   JEANNE 7.6* 7.7*  --   --   --  7.9*  --  7.7*   < > 8.0*   < > 7.8*   MAG 2.7* 2.8*  --   --   --  2.6*  --  2.6*   < > 2.6*   < > 2.8*   PHOS 5.3*  --   --   --   --  5.1*  --   --   --  5.1*  --  5.1*    < > = values in this interval not displayed.     Troponins:     Lab Results   Component Value Date    TROPONIN 32.924 () 10/21/2021    TROPONIN 77.281 () 10/20/2021    TROPONIN 68.939 () 10/20/2021    TROPONIN 93.846 () 10/19/2021    TROPONIN 108.262 () 10/19/2021     INR  Recent Labs   Lab 10/24/21  0322 10/23/21  0349 10/22/21  2227 10/22/21  1557   INR 1.29* 1.35* 1.33* 1.29*

## 2021-10-27 LAB — S100 CA BINDING PROTEIN B SER-MCNC: 598 NG/L

## 2021-10-27 NOTE — PROGRESS NOTES
Time of death 1835.  On comfort cares prior to passing.  Morphine gtt decreased from 4 mg/hr to 1 mg/hr due to RUE myoclonic jerking; 2 mg versed given with a decrease in jerking although it did persist.  Atropine given for secretions.      CSI contacted pt's wife Socorro.  Donation line contacted and will reach out to Socorro.  No autopsy requested by family.  All line with the exception of the tunneled subclavian line were removed and oncoming RN will assist in preparing the body so that security may escort it to the morgue.

## 2021-10-27 NOTE — DISCHARGE SUMMARY
[address] [address]  p: [phone]    Discharge Summary: Cardiology Service    Shin Smith MRN# [id number]   YOB: 1955 Age: 66 year old       Admission Date: 10/17/2021  Discharge Date: 10/26/21      Discharge Diagnoses:  1. Refractory VF Cardiac arrest requiring VA ECMO  2. Non-ischemic cardiomyopathy (EF 10-20%)  2. Concern for anoxic brain injury  3. Acute hypoxemic respiratory failure requiring intubation    Hospital Course by Diagnosis:  67 yo M without sig pmhx who was found to be gurgling and incontinent in his sleep by his wife who called EMS. Received some CPR and was found to be on ventricular fibrillation by EMS s/p shocks X3 amio and epi with ROSC.  Was emergently VA ECMO cannulated and transfer to the [facility] cath lab which showed non obstructive CAD.  Patient was de-cannulated on 10/20 with some recovery of cardiac function. Unfortunately extensive brain work-up has demonstrated severe anoxic brain injury. Transitioned to comfort cares and extubated on 10/24/2021. He was pronounced dead at 1835. Family denied autopsy.     Condition on discharge  Physical Exam: Unresponsive to noxious stimuli, Spontaneous respirations absent, Breath sounds absent, Carotid pulse absent, Heart sounds absent, Pupillary light reflex absent and Corneal blink reflex absent    Discussed with staff physician, Dr. Guevara.    Fawad Monterroso MD  Cardiology Fellow  [phone]

## 2021-10-27 NOTE — PROGRESS NOTES
SPIRITUAL HEALTH SERVICES  SPIRITUAL ASSESSMENT Progress Note (Palliative Focus)  [facility] ([place]) 6C    REFERRAL SOURCE: Palliative care follow up.    Visit with patient Shin Smith's wife Sariah at his bedside. Sariah engaged in story-telling about their relationship, particularly regarding time they had enjoyed together and the many qualities she loves in Shin. She also shared nature photography he had taken.      Sariah expressed some regret that she is not able to sit at bedside with him for long stretches of time because it is too painful emotionally. She was grateful for time yesterday when she believes Shin was able to respond to her meaningfully. She wants him to understand his situation, and she finds comfort in telling him about the many people waiting for him in heaven ([place]). Their extended family are supportive of Shin and Sariah.    Plan: Chaplain resident Giuseppe and/or I will follow for spiritual support while Palliative Care is consulted.      Hyun Muñoz  Palliative   Pager [phone]  [facility] Inpatient Team Consult pager [phone] (M-F 8-4:30)  After-hours Answering Service [phone]

## 2021-10-28 LAB
FENTANYL BLD CFM-MCNC: 1.6 NG/ML
FENTANYL SPEC QL: POSITIVE
NORFENTANYL BLD CFM-MCNC: NEGATIVE NG/ML

## 2021-11-02 LAB
AMPHET BLD CFM-MCNC: NEGATIVE NG/ML
APAP BLD-MCNC: NEGATIVE UG/ML
BARBITURATES SPEC-MCNC: NEGATIVE UG/ML
BENZODIAZ SPEC-MCNC: ABNORMAL NG/ML
BUPRENORPHINE SERPL-MCNC: NEGATIVE NG/ML
BZE BLD CFM-MCNC: NEGATIVE NG/ML
CARBOXYTHC BLD-MCNC: NEGATIVE NG/ML
CARISOPRODOL IA: NEGATIVE UG/ML
DECLARED MEDICATIONS: ABNORMAL
DRUGS FLD: ABNORMAL
ETHANOL BLD-MCNC: NEGATIVE GM/DL
FENTANYL IA: ABNORMAL NG/ML
GABAPENTIN IA: NEGATIVE UG/ML
MEPERIDINE SERPLBLD-MCNC: NEGATIVE NG/ML
METHADONE SAL CFM-MCNC: NEGATIVE NG/ML
OPIATES SPEC-MCNC: NEGATIVE NG/ML
OXYCODONE SERPLBLD SCN-MCNC: NEGATIVE NG/ML
PCP SPEC-MCNC: NEGATIVE NG/ML
PROPOXYPH SPEC-MCNC: NEGATIVE NG/ML
TRAMADOL BLD-MCNC: NEGATIVE NG/ML

## (undated) DEVICE — SU VICRYL 2-0 SH 27" UND J417H

## (undated) DEVICE — CATH ANGIO INFINITI 3DRC 6FRX100CM 534676T

## (undated) DEVICE — SU VICRYL 3-0 FS-1 27" J442H

## (undated) DEVICE — SU PROLENE 6-0 24" 8726H

## (undated) DEVICE — SYR 10ML LL W/O NDL 302995

## (undated) DEVICE — PREP POVIDONE IODINE SCRUB 7.5% 4OZ APL82212

## (undated) DEVICE — SUCTION MANIFOLD NEPTUNE 2 SYS 4 PORT 0702-020-000

## (undated) DEVICE — TOURNIQUET VASCULAR KIT 7 1/2" 79012

## (undated) DEVICE — PREP SKIN SCRUB TRAY 4461A

## (undated) DEVICE — DEFIB PRO-PADZ LVP LQD GEL ADULT 8900-2105-01

## (undated) DEVICE — SUTURE BOOTS 051003PBX

## (undated) DEVICE — SOL NACL 0.9% IRRIG 1000ML BOTTLE 2F7124

## (undated) DEVICE — PREP POVIDONE IODINE SOLUTION 10% 4OZ

## (undated) DEVICE — KIT ARTERIAL LINE 2GA 12CM INTRO NDL ASK-04510-HF

## (undated) DEVICE — PACK HEART LEFT CUSTOM

## (undated) DEVICE — LINEN GOWN XLG 5407

## (undated) DEVICE — LINEN TOWEL PACK X30 5481

## (undated) DEVICE — MANIFOLD KIT ANGIO AUTOMATED 014613

## (undated) DEVICE — SOL NACL 0.9% 10ML VIAL 0409-4888-02

## (undated) DEVICE — SU VICRYL 0 CT-1 27" UND J260H

## (undated) DEVICE — DRAPE IOBAN INCISE 23X17" 6650EZ

## (undated) DEVICE — SU ETHIBOND 0 CT-1 CR 8X18" CX21D

## (undated) DEVICE — CATH QUATTRO 9.3FR  FEM INSTRN

## (undated) DEVICE — CONNECTOR ONE-LINK INJECTION SITE LF 7N8399

## (undated) DEVICE — LINEN TOWEL PACK X6 WHITE 5487

## (undated) DEVICE — INTRO SHEATH MICRO PLATINUM TIP 4FRX40CM 7274

## (undated) DEVICE — DRSG BIOPATCH GERMICIDAL SPLIT SPONGE 4MM MED 4150

## (undated) DEVICE — CLIP HORIZON MED BLUE 002200

## (undated) DEVICE — SU PROLENE 4-0 RB-1DA 36" 8557H

## (undated) DEVICE — Device

## (undated) DEVICE — DRAPE TIBURON CARDIOVASCULAR PERI-GROIN LF 9154

## (undated) DEVICE — INTRO SHEATH 7FRX25CM PINNACLE RSS706

## (undated) DEVICE — RX SURGIFLO HEMOSTATIC MATRIX W/THROMBIN 8ML 2994

## (undated) DEVICE — GLOVE GAMMEX NEOPRENE ULTRA SZ 7.5 LF 8515

## (undated) DEVICE — CLIP HORIZON SM RED WIDE SLOT 001201

## (undated) DEVICE — ESU ELEC BLADE 2.75" COATED/INSULATED E1455

## (undated) DEVICE — VESSEL LOOPS YELLOW MAXI 31145694

## (undated) DEVICE — CATH ANGIO SUPERTORQUE PLUS JL4 6FRX100CM 533620

## (undated) DEVICE — SU PROLENE 4-0 SHDA 36" 8521H

## (undated) DEVICE — INTRO SHEATH 9FRX10CM PINNACLE RSS902

## (undated) DEVICE — DRSG GAUZE 4X4" TRAY 6939

## (undated) DEVICE — ADH SKIN CLOSURE PREMIERPRO EXOFIN 1.0ML 3470

## (undated) DEVICE — GLOVE PROTEXIS MICRO 7.5  2D73PM75

## (undated) DEVICE — KIT HAND CONTROL ACIST 014644 AR-P54

## (undated) DEVICE — PREP CHLORAPREP 26ML TINTED ORANGE  260815

## (undated) DEVICE — TUBING PRESSURE 30"

## (undated) DEVICE — SU SILK 0 TIE 6X30" A306H

## (undated) DEVICE — SU PROLENE 5-0 RB-2DA 30" 8710H

## (undated) DEVICE — PROTECTOR ARM ONE-STEP TRENDELENBURG 40418

## (undated) DEVICE — ESU GROUND PAD ADULT W/CORD E7507

## (undated) DEVICE — INTRO SHEATH 6FRX25CM PINNACLE RSS606

## (undated) RX ORDER — ROCURONIUM BROMIDE 50 MG/5 ML
SYRINGE (ML) INTRAVENOUS
Status: DISPENSED
Start: 2021-01-01

## (undated) RX ORDER — ATROPINE SULFATE 0.4 MG/ML
AMPUL (ML) INJECTION
Status: DISPENSED
Start: 2021-01-01

## (undated) RX ORDER — FENTANYL CITRATE 50 UG/ML
INJECTION, SOLUTION INTRAMUSCULAR; INTRAVENOUS
Status: DISPENSED
Start: 2021-01-01